# Patient Record
Sex: FEMALE | Race: WHITE | Employment: OTHER | URBAN - METROPOLITAN AREA
[De-identification: names, ages, dates, MRNs, and addresses within clinical notes are randomized per-mention and may not be internally consistent; named-entity substitution may affect disease eponyms.]

---

## 2017-01-13 ENCOUNTER — TELEPHONE (OUTPATIENT)
Dept: ORTHOPEDIC SURGERY | Age: 77
End: 2017-01-13

## 2017-01-18 ENCOUNTER — TELEPHONE (OUTPATIENT)
Dept: ORTHOPEDIC SURGERY | Age: 77
End: 2017-01-18

## 2017-09-14 ENCOUNTER — HOSPITAL ENCOUNTER (OUTPATIENT)
Dept: MAMMOGRAPHY | Age: 77
Discharge: HOME | End: 2017-09-14
Attending: OBSTETRICS & GYNECOLOGY | Admitting: OBSTETRICS & GYNECOLOGY

## 2017-09-14 DIAGNOSIS — Z12.31 ENCOUNTER FOR SCREENING MAMMOGRAM FOR BREAST CANCER: ICD-10-CM

## 2017-12-26 ENCOUNTER — TELEPHONE (OUTPATIENT)
Dept: ORTHOPEDIC SURGERY | Age: 77
End: 2017-12-26

## 2018-08-20 DIAGNOSIS — M17.10 LOCALIZED OSTEOARTHROSIS, LOWER LEG: Primary | Chronic | ICD-10-CM

## 2018-08-20 DIAGNOSIS — M17.11 PRIMARY OSTEOARTHRITIS OF RIGHT KNEE: Chronic | ICD-10-CM

## 2018-08-29 ENCOUNTER — TELEPHONE (OUTPATIENT)
Dept: ORTHOPEDIC SURGERY | Age: 78
End: 2018-08-29

## 2018-08-29 DIAGNOSIS — M17.10 LOCALIZED OSTEOARTHROSIS, LOWER LEG: Primary | Chronic | ICD-10-CM

## 2018-08-29 DIAGNOSIS — M17.11 PRIMARY OSTEOARTHRITIS OF RIGHT KNEE: Chronic | ICD-10-CM

## 2018-08-30 ENCOUNTER — TELEPHONE (OUTPATIENT)
Dept: ORTHOPEDIC SURGERY | Age: 78
End: 2018-08-30

## 2018-08-30 NOTE — TELEPHONE ENCOUNTER
08/30/2018 EUFLEXXA  (SERIES OF 3)   RIGHT KNEE. APPROVED # [id number]. DATES: 08/30/2018 -  08/29/2019. PER FAX FROM [facility]. NOTE: [facility] plan covers one series per 12 months.   AP

## 2018-09-04 ENCOUNTER — TELEPHONE (OUTPATIENT)
Dept: ORTHOPEDIC SURGERY | Age: 78
End: 2018-09-04

## 2018-09-12 ENCOUNTER — OFFICE VISIT (OUTPATIENT)
Dept: ORTHOPEDIC SURGERY | Age: 78
End: 2018-09-12

## 2018-09-12 VITALS
HEIGHT: 67 IN | WEIGHT: 152 LBS | BODY MASS INDEX: 23.86 KG/M2 | SYSTOLIC BLOOD PRESSURE: 121 MMHG | HEART RATE: 82 BPM | DIASTOLIC BLOOD PRESSURE: 70 MMHG

## 2018-09-12 DIAGNOSIS — M17.11 PRIMARY OSTEOARTHRITIS OF RIGHT KNEE: Primary | Chronic | ICD-10-CM

## 2018-09-12 PROCEDURE — 20610 DRAIN/INJ JOINT/BURSA W/O US: CPT | Performed by: FAMILY MEDICINE

## 2018-09-12 PROCEDURE — 99213 OFFICE O/P EST LOW 20 MIN: CPT | Performed by: FAMILY MEDICINE

## 2018-09-12 NOTE — PROGRESS NOTES
Chief Complaint  Knee Pain (EUFLEXXA #1 RIGHT KNEE)      Initial evaluation recurrent right anterior medial and lateral knee pain with history of known arthritis and previous positive response to Euflexxa    History of Present Illness:  Vivian Ng is a 69 y.o. female was very pleasant white female recreational walker who is a patient of Dr. Caitlin Gibson with known history of right knee multicompartment osteoarthritis who is being seen today in Dr. Rebecca Varner's absence for evaluation of worsening right knee pain. She completed her last round of Visco supplementation on 12/23/2016 and over the last 1-2 months, she has noticed increasing soreness involving the anterior medial and lateral portion of her right knee. There is no history of injury, new Activity or fall prior to becoming symptomatic. She does describe an achy pain at most 1-2 out of 10 although she has days that her knee does not really bother her substantially. Most of her pain is with distance walking positional changes and going up and down stairs which can be a 5-6 out of 10. She admits she is really not been very good about performing her home-based exercises and occasionally will take an Advil or Aleve in addition to her chronic meloxicam.  This is not done consistently. She does occasionally utilize her knee brace. Denies active locking catching or instability symptoms. She has had some mild episodic swelling. No groin pain. She has been contemplating repeat viscous supplementation would like to put off surgical intervention on her knee as long she can. Medical History     Patient's medications, allergies, past medical, surgical, social and family histories were reviewed and updated as appropriate. Review of Systems  Pertinent items are noted in HPI  Review of systems reviewed from Patient History Form dated on 9/12/2018 and available in the patient's chart under the Media tab.        Vital Signs  Vitals:    09/12/18 1501   BP: protection program once again in the office and she was encouraged to perform this on a regular basis. Icing and activity modification was discussed. She will be seen back each of the next 2 weeks by Dr. Diana Colindres to continue with her Euflexxa series which she has done very well with the past.          This dictation was performed with a verbal recognition program (DRAGON) and it was checked for errors. It is possible that there are still dictated errors within this office note. If so, please bring any errors to my attention for an addendum. All efforts were made to ensure that this office note is accurate.

## 2018-09-17 ENCOUNTER — OFFICE VISIT (OUTPATIENT)
Dept: ORTHOPEDIC SURGERY | Age: 78
End: 2018-09-17

## 2018-09-17 DIAGNOSIS — M17.11 PRIMARY OSTEOARTHRITIS OF RIGHT KNEE: Primary | Chronic | ICD-10-CM

## 2018-09-17 PROCEDURE — 20610 DRAIN/INJ JOINT/BURSA W/O US: CPT | Performed by: ORTHOPAEDIC SURGERY

## 2018-09-21 ENCOUNTER — OFFICE VISIT (OUTPATIENT)
Dept: ORTHOPEDIC SURGERY | Age: 78
End: 2018-09-21

## 2018-09-21 VITALS
HEART RATE: 86 BPM | DIASTOLIC BLOOD PRESSURE: 74 MMHG | HEIGHT: 67 IN | BODY MASS INDEX: 23.54 KG/M2 | SYSTOLIC BLOOD PRESSURE: 130 MMHG | WEIGHT: 150 LBS

## 2018-09-21 DIAGNOSIS — M17.11 PRIMARY OSTEOARTHRITIS OF RIGHT KNEE: Primary | Chronic | ICD-10-CM

## 2018-09-21 PROCEDURE — 20610 DRAIN/INJ JOINT/BURSA W/O US: CPT | Performed by: ORTHOPAEDIC SURGERY

## 2018-09-21 PROCEDURE — 99999 PR OFFICE/OUTPT VISIT,PROCEDURE ONLY: CPT | Performed by: ORTHOPAEDIC SURGERY

## 2018-09-21 RX ORDER — METHYLPREDNISOLONE 4 MG/1
1 TABLET ORAL
Status: ON HOLD | COMMUNITY
Start: 2018-02-21 | End: 2021-10-31

## 2018-09-21 RX ORDER — PREDNISONE 10 MG/1
10 TABLET ORAL
Status: ON HOLD | COMMUNITY
Start: 2018-02-26 | End: 2021-10-31

## 2018-09-21 RX ORDER — CARVEDILOL 3.12 MG/1
3.12 TABLET ORAL
COMMUNITY
Start: 2018-03-27 | End: 2021-10-27

## 2018-09-21 NOTE — PROGRESS NOTES
injection of the Right knee  2) Osteoarthritis    Plan:  1) ice, elevation, gentle range of motion as needed for swelling or stiffness of the knee  2) NSAIDs for any pain after injection       Hafsa Patel

## 2018-11-09 ENCOUNTER — HOSPITAL ENCOUNTER (OUTPATIENT)
Dept: WOMENS IMAGING | Age: 78
Discharge: HOME | End: 2018-11-09
Payer: COMMERCIAL

## 2018-11-09 DIAGNOSIS — Z12.31 ENCOUNTER FOR SCREENING MAMMOGRAM FOR BREAST CANCER: ICD-10-CM

## 2018-11-09 PROCEDURE — 77063 BREAST TOMOSYNTHESIS BI: CPT

## 2019-12-02 ENCOUNTER — HOSPITAL ENCOUNTER (OUTPATIENT)
Dept: WOMENS IMAGING | Age: 79
Discharge: HOME | End: 2019-12-02
Payer: COMMERCIAL

## 2019-12-02 DIAGNOSIS — Z12.31 ENCOUNTER FOR SCREENING MAMMOGRAM FOR BREAST CANCER: ICD-10-CM

## 2019-12-02 PROCEDURE — 77063 BREAST TOMOSYNTHESIS BI: CPT

## 2020-12-22 ENCOUNTER — HOSPITAL ENCOUNTER (OUTPATIENT)
Dept: WOMENS IMAGING | Age: 80
Discharge: HOME | End: 2020-12-22
Payer: COMMERCIAL

## 2020-12-22 PROCEDURE — 77063 BREAST TOMOSYNTHESIS BI: CPT

## 2021-02-17 ENCOUNTER — IMMUNIZATION (OUTPATIENT)
Dept: PRIMARY CARE CLINIC | Age: 81
End: 2021-02-17
Payer: COMMERCIAL

## 2021-02-17 PROCEDURE — 91301 COVID-19, MODERNA VACCINE 100MCG/0.5ML DOSE: CPT | Performed by: FAMILY MEDICINE

## 2021-02-17 PROCEDURE — 0011A COVID-19, MODERNA VACCINE 100MCG/0.5ML DOSE: CPT | Performed by: FAMILY MEDICINE

## 2021-03-17 ENCOUNTER — IMMUNIZATION (OUTPATIENT)
Dept: PRIMARY CARE CLINIC | Age: 81
End: 2021-03-17
Payer: COMMERCIAL

## 2021-03-17 PROCEDURE — 91301 COVID-19, MODERNA VACCINE 100MCG/0.5ML DOSE: CPT | Performed by: FAMILY MEDICINE

## 2021-03-17 PROCEDURE — 0012A COVID-19, MODERNA VACCINE 100MCG/0.5ML DOSE: CPT | Performed by: FAMILY MEDICINE

## 2021-10-27 ENCOUNTER — OFFICE VISIT (OUTPATIENT)
Dept: CARDIOLOGY CLINIC | Age: 81
End: 2021-10-27
Payer: COMMERCIAL

## 2021-10-27 VITALS
DIASTOLIC BLOOD PRESSURE: 86 MMHG | BODY MASS INDEX: 24.17 KG/M2 | WEIGHT: 154 LBS | HEART RATE: 84 BPM | OXYGEN SATURATION: 97 % | HEIGHT: 67 IN | SYSTOLIC BLOOD PRESSURE: 158 MMHG

## 2021-10-27 DIAGNOSIS — I51.89 DIASTOLIC DYSFUNCTION: ICD-10-CM

## 2021-10-27 DIAGNOSIS — I42.8 NON-ISCHEMIC CARDIOMYOPATHY (HCC): ICD-10-CM

## 2021-10-27 DIAGNOSIS — I10 ESSENTIAL HYPERTENSION: ICD-10-CM

## 2021-10-27 DIAGNOSIS — I34.0 MITRAL VALVE INSUFFICIENCY, UNSPECIFIED ETIOLOGY: ICD-10-CM

## 2021-10-27 DIAGNOSIS — I48.19 PERSISTENT ATRIAL FIBRILLATION (HCC): Primary | ICD-10-CM

## 2021-10-27 DIAGNOSIS — Z87.891 FORMER TOBACCO USE: ICD-10-CM

## 2021-10-27 PROCEDURE — 99204 OFFICE O/P NEW MOD 45 MIN: CPT | Performed by: INTERNAL MEDICINE

## 2021-10-27 PROCEDURE — 93000 ELECTROCARDIOGRAM COMPLETE: CPT | Performed by: INTERNAL MEDICINE

## 2021-10-27 RX ORDER — CARVEDILOL 6.25 MG/1
6.25 TABLET ORAL 2 TIMES DAILY
Qty: 30 TABLET | Refills: 3 | Status: ON HOLD
Start: 2021-10-27 | End: 2021-11-09 | Stop reason: HOSPADM

## 2021-10-27 NOTE — PROGRESS NOTES
[address] [facility]   Cardiovascular Evaluation    PATIENT: Jerardo Morgan  DATE: 10/27/2021  MRN: <[id number]>  CSN: [id number]  : 1940      Primary Care Doctor: Stephanie Valadez MD  Reason for evaluation:   Atrial fibrillation    Subjective:   History of present illness on initial date of evaluation:  Kelly Tomas is a 80-year-old female with a history of non-ischemic cardiomyopathy with recovered LVEF, essential hypertension, mitral regurgitation, tobacco use, and recently diagnosed atrial fibrillation who presents a new patient. The patient previously received her cardiology care at [facility].  She was recently diagnosed with atrial fibrillation by her PCP and started on coreg and Eliquis. She had been scheduled for a ROBERTO/DCCV at [facility] once she had had an adequate period of anticoagulation, but she would like to transition her care to [facility]. She states that she has had some fatigue, shortness of breath, and occasional palpitations recently. She denies any chest pain, lightheadedness, dizziness, lower extremity edema, orthopnea, or paroxysmal nocturnal dyspnea. She was diagnosed with a mildly reduced LVEF of 40-45% on a TTE from  at [facility].  At that time, she underwent invasive coronary angiography which demonstrated no angiographically significant coronary artery disease. Her LVEF had recovered to 60-65% on a TTE from  and was estimated between 52-57% on her most recent TTE at [facility] on 20. Patient Active Problem List   Diagnosis    Localized osteoarthrosis, lower leg    Tear of lateral cartilage or meniscus of knee, current    Tear of medial meniscus of right knee    Primary osteoarthritis of right knee         Past Medical History:   has a past medical history of Hypertension and Thyroid disease. Surgical History:   has a past surgical history that includes Hemorrhoid surgery; Hysterectomy;  Tonsillectomy; and Knee arthroscopy (Right, 3/10/2016). Social History:   reports that she quit smoking about 41 years ago. She has never used smokeless tobacco. She reports current alcohol use of about 12.0 - 14.0 standard drinks of alcohol per week. She reports that she does not use drugs. Family History:  No evidence for sudden cardiac death or premature CAD. Home Medications:  Reviewed and are listed in nursing record. and/or listed below  Current Outpatient Medications   Medication Sig Dispense Refill    Apixaban (ELIQUIS PO) Take by mouth      ibuprofen (ADVIL;MOTRIN) 800 MG tablet TAKE 1 TABLET BY MOUTH THREE TIMES DAILY( EVERY EIGHT HOURS) 60 tablet 0    SYNTHROID 100 MCG tablet Take 100 mcg by mouth daily       losartan-hydrochlorothiazide (HYZAAR) 50-12.5 MG per tablet Take 0.5 tablets by mouth 2 times daily       carvedilol (COREG) 3.125 MG tablet Take 3.125 mg by mouth 2 times daily       carvedilol (COREG) 3.125 MG tablet Take 3.125 mg by mouth (Patient not taking: Reported on 10/27/2021)      methylPREDNISolone (MEDROL DOSEPACK) 4 MG tablet Take 1 Package by mouth (Patient not taking: Reported on 10/27/2021)      predniSONE (DELTASONE) 10 MG tablet Take 10 mg by mouth (Patient not taking: Reported on 10/27/2021)      meloxicam (MOBIC) 15 MG tablet TAKE 1 TABLET BY MOUTH DAILY 30 tablet 0    meloxicam (MOBIC) 15 MG tablet TAKE 1 TABLET BY MOUTH DAILY 30 tablet 0    meloxicam (MOBIC) 15 MG tablet TAKE 1 TABLET BY MOUTH DAILY 30 tablet 0    HYDROcodone-acetaminophen (NORCO) 5-325 MG per tablet Take 1 to 2 tablets by mouth every 6 to 8 hours as needed for pain. (Patient not taking: Reported on 10/27/2021) 20 tablet 0     No current facility-administered medications for this visit. Allergies:  Shellfish-derived products     Review of Systems:   Review of Systems   Constitutional: Positive for fatigue. Negative for chills and fever. HENT: Negative for congestion, rhinorrhea and sore throat.     Eyes: Negative for photophobia, pain and visual disturbance. Respiratory: Positive for shortness of breath. Negative for cough. Cardiovascular: Positive for palpitations. Negative for chest pain and leg swelling. Gastrointestinal: Negative for abdominal pain, blood in stool, constipation, diarrhea, nausea and vomiting. Endocrine: Negative for cold intolerance and heat intolerance. Genitourinary: Negative for difficulty urinating, dysuria and hematuria. Musculoskeletal: Negative for arthralgias, joint swelling and myalgias. Skin: Negative for rash and wound. Allergic/Immunologic: Negative for environmental allergies and food allergies. Neurological: Negative for dizziness, syncope and light-headedness. Hematological: Negative for adenopathy. Does not bruise/bleed easily. Psychiatric/Behavioral: Negative for dysphoric mood. The patient is not nervous/anxious. Objective:   PHYSICAL EXAM:    Vitals:    10/27/21 1548   BP: (!) 158/86   Pulse: 84   SpO2: 97% on room air    Weight: 154 lb (69.9 kg)     Wt Readings from Last 3 Encounters:   10/27/21 154 lb (69.9 kg)   09/21/18 150 lb (68 kg)   09/12/18 152 lb (68.9 kg)     General: Elderly female in no acute distress. Pleasant and interactive on exam.  HEENT: Normocephalic, atraumatic, non-icteric, hearing intact, nares normal, mucous membranes moist.  Neck: Supple, trachea midline. No adenopathy. No thyromegaly. No carotid bruits. No JVD. Heart: Irregularly irregular rate and rhythm. Normal S1 and S2. Grade II/VI holosystolic murmur. No rubs or gallops. Lungs: Normal respiratory effort. Clear to auscultation bilaterally. No wheezes, rales, or rhonchi. Abdomen: Soft, non-tender. Normoactive bowel sounds. No masses or organomegaly. Skin: No rashes, wounds, or lesions. Pulses: 2+ and symmetric. Extremities: No clubbing, cyanosis, or edema. Musculoskeletal: Spontaneously moves all four extremities. Psych: Normal mood and affect.   Neuro: Alert and oriented to person, place, and time. No focal deficits noted. LABS   CBC:      Lab Results   Component Value Date    WBC 4.8 09/25/2013    RBC 4.29 09/25/2013    HGB 13.4 09/25/2013    HCT 39.6 09/25/2013    MCV 92.2 09/25/2013    RDW 13.9 09/25/2013     09/25/2013     CMP:  Lab Results   Component Value Date     02/25/2010    K 4.1 02/25/2010     02/25/2010    CO2 30 02/25/2010    BUN 17 02/25/2010    CREATININE 0.8 02/25/2010    GFRAA >60 02/25/2010    GLUCOSE 104 02/25/2010    PROT 7.6 02/25/2010    CALCIUM 9.7 02/25/2010    BILITOT 0.50 02/25/2010    ALKPHOS 55 02/25/2010    AST 30 02/25/2010    ALT 28 02/25/2010     PT/INR:   No results found for: PTINR  Liver:  No components found for: CHLPL  Lab Results   Component Value Date    ALT 28 02/25/2010    AST 30 02/25/2010    ALKPHOS 55 02/25/2010    BILITOT 0.50 02/25/2010     No results found for: LABA1C  Lipids:       No results found for: TRIG       No results found for: HDL       No results found for: LDLCALC       No results found for: LABVLDL      CARDIAC DATA   LAST EKG 10/27/21: Atrial fibrillation, non-specific T wave abnormality. Echo:  TTE 3/2/05:  RESULTS:    1. Technically adequate study. 2.  Left ventricular function is abnormal.  There are septal and       lateral hypokinesis. Estimated ejection fraction is 40% to 45%. 3. Wright Neither is no chamber enlargement. Wright Neither is no ventricular       hypertrophy.  The right sided chambers are not enlarged. There       is mild left atrial enlargement. 4.  The aortic, mitral, tricuspid and pulmonary valves function       adequately. Wright Neither is no evidence of valvular stenosis or       prolapse. 5.  No thrombus, mass or effusions were seen. 6.  Doppler interrogation revealed normal antegrade velocities.  The       mean gradient across the aortic valve is 4.6 mm Hg.  Color Doppler       demonstrated mild mitral and tricuspid regurgitation.  Right       ventricular systolic pressure was 25 mm Hg.      TTE 9/11/14:  Study Conclusions   - Left ventricle: The cavity size was normal. Wall thickness     was normal. Systolic function was normal. The estimated     ejection fraction was in the range of 60% to 65%. Wall     motion was normal; there were no regional wall motion     abnormalities. Doppler parameters are consistent with     abnormal left ventricular relaxation (grade 1 diastolic     dysfunction). Mitral valve: Mildly calcified annulus.     Mildly thickened leaflets. Left atrium: The atrium was     mildly dilated. Inferior vena cava: The vessel was normal     in size; the respirophasic diameter changes were in the     normal range (= 50%); findings are consistent with normal     central venous pressure. TTE 3/12/18:  Study Conclusions   - Left ventricle: The cavity size was normal. There was mild     concentric hypertrophy. Systolic function was normal. The     estimated ejection fraction was in the range of 54% to 58%. Wall     motion was normal; there were no regional wall motion     abnormalities. Doppler parameters are consistent with abnormal     left ventricular relaxation (grade 1 diastolic dysfunction). The     global longitudinal strain was -19%. - Mitral valve: The annulus was mildly calcified. The leaflets were     mildly thickened. Leaflet separation was reduced. Mobility was     restricted. There was mild regurgitation directed posteriorly. - Left atrium: The atrium was mildly to moderately dilated. - Inferior vena cava: The vessel was normal in size; the     respirophasic diameter changes were in the normal range (>= 50%);     findings are consistent with normal central venous pressure.        TTE 6/23/20:  Study Conclusions   - Left ventricle: The cavity size is normal. There is mild focal     basal hypertrophy. There is hypertrophy of the septum. Systolic     function was normal. The estimated ejection fraction was in the   Filiberto of 52% to 57%.  Wall motion was normal; there were no     regional wall motion abnormalities. Features are consistent with     a pseudonormal left ventricular filling pattern, with concomitant     abnormal relaxation and increased filling pressure (grade 2     diastolic dysfunction). The global longitudinal strain was -22%. - Aortic valve: Thickening, consistent with sclerosis. - Mitral valve: Leaflet separation was reduced. Mobility was     restricted. There was mild to moderate regurgitation.   - Left atrium: The atrium is markedly dilated. - Inferior vena cava: The vessel was normal in size; the     respirophasic diameter changes were in the normal range (&gt;= 50%);     findings are consistent with normal central venous pressure.         Cath:  [facility] 4/6/05:  FINDINGS:     CORONARY ARTERIOGRAPHY: Coronary arteriography of this   co-dominant system revealed no significant stenoses of the LMCA,   LAD, left circumflex or RCA. LEFT VENTRICULOGRAPHY: Left ventriculogram in the RANGEL projection   in the setting of ectopy revealed normal left ventricular   function with an estimated ejection fraction of greater then 50%   and trace mitral regurgitation. IMPRESSION: No significant coronary artery disease, probably   normal left ventricular function. PLAN:   Medical therapy. We will arrange a MUGA to definitively assess LV function.         Other Studies:   Chest X-ray 10/30/21:  Mild cardiomegaly and COPD.  No other cardiopulmonary disease. Assessment:     1. Persistent atrial fibrillation - Ventricular rate currently controlled. [id number] is 5. Recently started on Eliquis. 2. Non-ischemic cardiomyopathy with recovered LVEF - Diagnosed with a mildly reduced LVEF of 40-45% on a TTE from 2005 at [facility].  At that time, she underwent invasive coronary angiography which demonstrated no angiographically significant coronary artery disease.   Her LVEF had recovered to 60-65% on a TTE from 2014 and was estimated between 52-57% on her most recent TTE at [facility] on 6/23/20. 3. Essential hypertension - BP elevated today in clinic at 160/85 and 158/86 on repeat. 4. Diastolic dysfunction - Currently appears clinically euvolemic. 5. Mitral regurgitation  6. Former tobacco use      Plan:     1. Will continue current medical therapy with Eliquis 5 mg BID, coreg 6.25 mg BID, and losartan-HCTZ 25-6.25 mg BID for now. 2. Obtain TTE for formal evaluation of cardiac structure and function. 3. Will arrange for ROBERTO/DCCV in approximately 2 weeks after patient has completed adequate period of anticoagulation. 4. Her BP is elevated today in clinic and if elevated on repeat evaluation, will plan to increase losartan-HCTZ to 50-12.5 mg BID. 5. Per review of her outside records, it does not appear that she has had any recent lab work, so will check CBC, BMP, magnesium, and TSH. 6. Follow-up after electrocardioversion. Joan Rivera RN, am scribing for and in the presence of Dr. Oumar Garcia. 10/27/21 4:33 PM   Evelien Khan RN      It is a pleasure to assist in the care of Patrick Mas. Please call with any questions. The scribes documentation has been prepared under my direction and personally reviewed by me in its entirety. I confirm that the note above accurately reflects all work, treatment, procedures, and medical decision making performed by me. I, Dr. Libra Shell, personally performed the services described in this documentation as scribed by Eveline Khan RN in my presence, and it is both accurate and complete to the best of our ability.        Libra Shell, [address] [facility]  [phone] [facility]  [phone] [address] [place] [address]

## 2021-10-27 NOTE — PATIENT INSTRUCTIONS
Patient Plan:  1. Recommend ROBERTO and Cardioversion to pursue normal rhythm.   -We will call you to set this up in 2 weeks. 2. Continue blood thinner to prevent stroke. 3. Echo to re assess heart structure. You need to call to schedule this. 4. Blood work- CBC, BMP, magnesium and TSH- get this done today. 5. Call us with your dose of carvedilol. 6. Follow up after procedure. Your provider has ordered testing for further evaluation. An order/prescription has been included in your paper work.  To schedule outpatient testing, contact Central Scheduling by calling [address] [place] [address] ([phone]).

## 2021-10-29 ENCOUNTER — TELEPHONE (OUTPATIENT)
Dept: CARDIOLOGY CLINIC | Age: 81
End: 2021-10-29

## 2021-10-29 NOTE — TELEPHONE ENCOUNTER
Yes, let's wait until she recovers from her current illness prior to proceeding with ROBERTO/DCCV. She can get her COVID test closer to procedure once it has been scheduled.

## 2021-10-29 NOTE — TELEPHONE ENCOUNTER
Patient calling in to let [facility] know that she has a virus right now and needs to put off her testing prior to her DCCV. She will call back when she schedules testing and let us know.

## 2021-10-29 NOTE — TELEPHONE ENCOUNTER
Spoke to pt and relayed [facility] message. V/U. Pt will call us once she starts feeling better.

## 2021-10-29 NOTE — TELEPHONE ENCOUNTER
Patient seen in office 10/27/2021. ROBERTO and CV discussed and agreed upon by [facility] and patient. Medications not discussed. Patient will need covid testing prior. Thank you!        Schedule for 2 weeks out please per [facility]

## 2021-10-30 ENCOUNTER — APPOINTMENT (OUTPATIENT)
Dept: CT IMAGING | Age: 81
DRG: 871 | End: 2021-10-30
Payer: MEDICARE

## 2021-10-30 ENCOUNTER — HOSPITAL ENCOUNTER (INPATIENT)
Age: 81
LOS: 10 days | Discharge: HOME HEALTH | DRG: 871 | End: 2021-11-09
Attending: EMERGENCY MEDICINE | Admitting: INTERNAL MEDICINE
Payer: MEDICARE

## 2021-10-30 ENCOUNTER — APPOINTMENT (OUTPATIENT)
Dept: GENERAL RADIOLOGY | Age: 81
DRG: 871 | End: 2021-10-30
Payer: MEDICARE

## 2021-10-30 DIAGNOSIS — N39.0 URINARY TRACT INFECTION WITH HEMATURIA, SITE UNSPECIFIED: ICD-10-CM

## 2021-10-30 DIAGNOSIS — I48.91 RAPID ATRIAL FIBRILLATION (HCC): ICD-10-CM

## 2021-10-30 DIAGNOSIS — N17.9 ACUTE RENAL FAILURE, UNSPECIFIED ACUTE RENAL FAILURE TYPE (HCC): Primary | ICD-10-CM

## 2021-10-30 DIAGNOSIS — R11.2 NAUSEA, VOMITING, AND DIARRHEA: ICD-10-CM

## 2021-10-30 DIAGNOSIS — R19.7 NAUSEA, VOMITING, AND DIARRHEA: ICD-10-CM

## 2021-10-30 DIAGNOSIS — E83.42 HYPOMAGNESEMIA: ICD-10-CM

## 2021-10-30 DIAGNOSIS — R31.9 URINARY TRACT INFECTION WITH HEMATURIA, SITE UNSPECIFIED: ICD-10-CM

## 2021-10-30 PROBLEM — A41.9 SEPSIS (HCC): Status: ACTIVE | Noted: 2021-10-30

## 2021-10-30 LAB
A/G RATIO: 1 (ref 1.1–2.2)
ALBUMIN SERPL-MCNC: 3 G/DL (ref 3.4–5)
ALP BLD-CCNC: 90 U/L (ref 40–129)
ALT SERPL-CCNC: 32 U/L (ref 10–40)
ANION GAP SERPL CALCULATED.3IONS-SCNC: 13 MMOL/L (ref 3–16)
ANISOCYTOSIS: ABNORMAL
AST SERPL-CCNC: 35 U/L (ref 15–37)
BACTERIA: ABNORMAL /HPF
BANDED NEUTROPHILS RELATIVE PERCENT: 21 % (ref 0–7)
BASOPHILS ABSOLUTE: 0 K/UL (ref 0–0.2)
BASOPHILS RELATIVE PERCENT: 0 %
BILIRUB SERPL-MCNC: 1.6 MG/DL (ref 0–1)
BILIRUBIN URINE: NEGATIVE
BLOOD, URINE: ABNORMAL
BUN BLDV-MCNC: 38 MG/DL (ref 7–20)
CALCIUM SERPL-MCNC: 8.4 MG/DL (ref 8.3–10.6)
CHLORIDE BLD-SCNC: 89 MMOL/L (ref 99–110)
CLARITY: ABNORMAL
CO2: 20 MMOL/L (ref 21–32)
COLOR: YELLOW
CREAT SERPL-MCNC: 1.7 MG/DL (ref 0.6–1.2)
DOHLE BODIES: PRESENT
EOSINOPHILS ABSOLUTE: 0 K/UL (ref 0–0.6)
EOSINOPHILS RELATIVE PERCENT: 0 %
GFR AFRICAN AMERICAN: 35
GFR NON-AFRICAN AMERICAN: 29
GLUCOSE BLD-MCNC: 230 MG/DL (ref 70–99)
GLUCOSE URINE: NEGATIVE MG/DL
HCT VFR BLD CALC: 36.5 % (ref 36–48)
HEMOGLOBIN: 12.2 G/DL (ref 12–16)
KETONES, URINE: NEGATIVE MG/DL
LACTIC ACID, SEPSIS: 2.4 MMOL/L (ref 0.4–1.9)
LEUKOCYTE ESTERASE, URINE: ABNORMAL
LYMPHOCYTES ABSOLUTE: 0.8 K/UL (ref 1–5.1)
LYMPHOCYTES RELATIVE PERCENT: 4 %
MAGNESIUM: 1.6 MG/DL (ref 1.8–2.4)
MCH RBC QN AUTO: 29.7 PG (ref 26–34)
MCHC RBC AUTO-ENTMCNC: 33.5 G/DL (ref 31–36)
MCV RBC AUTO: 88.4 FL (ref 80–100)
MICROSCOPIC EXAMINATION: YES
MONOCYTES ABSOLUTE: 0.2 K/UL (ref 0–1.3)
MONOCYTES RELATIVE PERCENT: 1 %
NEUTROPHILS ABSOLUTE: 20.1 K/UL (ref 1.7–7.7)
NEUTROPHILS RELATIVE PERCENT: 74 %
NITRITE, URINE: NEGATIVE
PDW BLD-RTO: 13.9 % (ref 12.4–15.4)
PH UA: 6 (ref 5–8)
PLATELET # BLD: 136 K/UL (ref 135–450)
PLATELET SLIDE REVIEW: ADEQUATE
PMV BLD AUTO: 8.1 FL (ref 5–10.5)
POTASSIUM REFLEX MAGNESIUM: 3.5 MMOL/L (ref 3.5–5.1)
PRO-BNP: 7318 PG/ML (ref 0–449)
PROTEIN UA: 30 MG/DL
RBC # BLD: 4.13 M/UL (ref 4–5.2)
RBC UA: ABNORMAL /HPF (ref 0–4)
SLIDE REVIEW: ABNORMAL
SODIUM BLD-SCNC: 122 MMOL/L (ref 136–145)
SPECIFIC GRAVITY UA: 1.02 (ref 1–1.03)
TOTAL PROTEIN: 5.9 G/DL (ref 6.4–8.2)
TROPONIN: <0.01 NG/ML
URINE REFLEX TO CULTURE: YES
URINE TYPE: ABNORMAL
UROBILINOGEN, URINE: 1 E.U./DL
VACUOLATED NEUTROPHILS: PRESENT
WBC # BLD: 21.2 K/UL (ref 4–11)
WBC UA: >100 /HPF (ref 0–5)

## 2021-10-30 PROCEDURE — 87186 SC STD MICRODIL/AGAR DIL: CPT

## 2021-10-30 PROCEDURE — 87040 BLOOD CULTURE FOR BACTERIA: CPT

## 2021-10-30 PROCEDURE — 81001 URINALYSIS AUTO W/SCOPE: CPT

## 2021-10-30 PROCEDURE — 83605 ASSAY OF LACTIC ACID: CPT

## 2021-10-30 PROCEDURE — 96361 HYDRATE IV INFUSION ADD-ON: CPT

## 2021-10-30 PROCEDURE — 2580000003 HC RX 258: Performed by: EMERGENCY MEDICINE

## 2021-10-30 PROCEDURE — 71045 X-RAY EXAM CHEST 1 VIEW: CPT

## 2021-10-30 PROCEDURE — 87086 URINE CULTURE/COLONY COUNT: CPT

## 2021-10-30 PROCEDURE — 74176 CT ABD & PELVIS W/O CONTRAST: CPT

## 2021-10-30 PROCEDURE — 87088 URINE BACTERIA CULTURE: CPT

## 2021-10-30 PROCEDURE — 83735 ASSAY OF MAGNESIUM: CPT

## 2021-10-30 PROCEDURE — 83880 ASSAY OF NATRIURETIC PEPTIDE: CPT

## 2021-10-30 PROCEDURE — 93005 ELECTROCARDIOGRAM TRACING: CPT | Performed by: EMERGENCY MEDICINE

## 2021-10-30 PROCEDURE — 85025 COMPLETE CBC W/AUTO DIFF WBC: CPT

## 2021-10-30 PROCEDURE — 99285 EMERGENCY DEPT VISIT HI MDM: CPT

## 2021-10-30 PROCEDURE — 2060000000 HC ICU INTERMEDIATE R&B

## 2021-10-30 PROCEDURE — 80053 COMPREHEN METABOLIC PANEL: CPT

## 2021-10-30 PROCEDURE — 96365 THER/PROPH/DIAG IV INF INIT: CPT

## 2021-10-30 PROCEDURE — 87150 DNA/RNA AMPLIFIED PROBE: CPT

## 2021-10-30 PROCEDURE — 6360000002 HC RX W HCPCS: Performed by: EMERGENCY MEDICINE

## 2021-10-30 PROCEDURE — 84484 ASSAY OF TROPONIN QUANT: CPT

## 2021-10-30 RX ORDER — SODIUM CHLORIDE 0.9 % (FLUSH) 0.9 %
5-40 SYRINGE (ML) INJECTION PRN
Status: DISCONTINUED | OUTPATIENT
Start: 2021-10-30 | End: 2021-11-09 | Stop reason: HOSPADM

## 2021-10-30 RX ORDER — SODIUM CHLORIDE 9 MG/ML
25 INJECTION, SOLUTION INTRAVENOUS PRN
Status: DISCONTINUED | OUTPATIENT
Start: 2021-10-30 | End: 2021-11-09 | Stop reason: HOSPADM

## 2021-10-30 RX ORDER — ONDANSETRON 2 MG/ML
4 INJECTION INTRAMUSCULAR; INTRAVENOUS
Status: DISCONTINUED | OUTPATIENT
Start: 2021-10-30 | End: 2021-11-09 | Stop reason: HOSPADM

## 2021-10-30 RX ORDER — MAGNESIUM SULFATE IN WATER 40 MG/ML
2000 INJECTION, SOLUTION INTRAVENOUS ONCE
Status: COMPLETED | OUTPATIENT
Start: 2021-10-30 | End: 2021-10-31

## 2021-10-30 RX ORDER — SODIUM CHLORIDE 0.9 % (FLUSH) 0.9 %
5-40 SYRINGE (ML) INJECTION EVERY 12 HOURS SCHEDULED
Status: DISCONTINUED | OUTPATIENT
Start: 2021-10-30 | End: 2021-11-09 | Stop reason: HOSPADM

## 2021-10-30 RX ORDER — 0.9 % SODIUM CHLORIDE 0.9 %
30 INTRAVENOUS SOLUTION INTRAVENOUS ONCE
Status: COMPLETED | OUTPATIENT
Start: 2021-10-30 | End: 2021-10-30

## 2021-10-30 RX ADMIN — VANCOMYCIN HYDROCHLORIDE 1500 MG: 10 INJECTION, POWDER, LYOPHILIZED, FOR SOLUTION INTRAVENOUS at 23:04

## 2021-10-30 RX ADMIN — PIPERACILLIN SODIUM AND TAZOBACTAM SODIUM 4500 MG: 4; .5 INJECTION, POWDER, LYOPHILIZED, FOR SOLUTION INTRAVENOUS at 22:11

## 2021-10-30 RX ADMIN — SODIUM CHLORIDE 2040 ML: 9 INJECTION, SOLUTION INTRAVENOUS at 21:06

## 2021-10-30 ASSESSMENT — PAIN SCALES - GENERAL: PAINLEVEL_OUTOF10: 8

## 2021-10-31 LAB
A/G RATIO: 1 (ref 1.1–2.2)
ALBUMIN SERPL-MCNC: 2.7 G/DL (ref 3.4–5)
ALP BLD-CCNC: 86 U/L (ref 40–129)
ALT SERPL-CCNC: 28 U/L (ref 10–40)
ANION GAP SERPL CALCULATED.3IONS-SCNC: 12 MMOL/L (ref 3–16)
ANION GAP SERPL CALCULATED.3IONS-SCNC: 13 MMOL/L (ref 3–16)
AST SERPL-CCNC: 30 U/L (ref 15–37)
BANDED NEUTROPHILS RELATIVE PERCENT: 6 % (ref 0–7)
BASOPHILS ABSOLUTE: 0 K/UL (ref 0–0.2)
BASOPHILS RELATIVE PERCENT: 0 %
BILIRUB SERPL-MCNC: 1.6 MG/DL (ref 0–1)
BUN BLDV-MCNC: 36 MG/DL (ref 7–20)
BUN BLDV-MCNC: 36 MG/DL (ref 7–20)
CALCIUM SERPL-MCNC: 7.7 MG/DL (ref 8.3–10.6)
CALCIUM SERPL-MCNC: 7.9 MG/DL (ref 8.3–10.6)
CHLORIDE BLD-SCNC: 92 MMOL/L (ref 99–110)
CHLORIDE BLD-SCNC: 93 MMOL/L (ref 99–110)
CO2: 18 MMOL/L (ref 21–32)
CO2: 18 MMOL/L (ref 21–32)
CREAT SERPL-MCNC: 1.4 MG/DL (ref 0.6–1.2)
CREAT SERPL-MCNC: 1.5 MG/DL (ref 0.6–1.2)
CREATININE URINE: 75.1 MG/DL (ref 28–259)
EKG ATRIAL RATE: 138 BPM
EKG DIAGNOSIS: NORMAL
EKG Q-T INTERVAL: 362 MS
EKG QRS DURATION: 90 MS
EKG QTC CALCULATION (BAZETT): 480 MS
EKG R AXIS: 62 DEGREES
EKG T AXIS: 10 DEGREES
EKG VENTRICULAR RATE: 106 BPM
EOSINOPHILS ABSOLUTE: 0 K/UL (ref 0–0.6)
EOSINOPHILS RELATIVE PERCENT: 0 %
GFR AFRICAN AMERICAN: 40
GFR AFRICAN AMERICAN: 44
GFR NON-AFRICAN AMERICAN: 33
GFR NON-AFRICAN AMERICAN: 36
GLUCOSE BLD-MCNC: 154 MG/DL (ref 70–99)
GLUCOSE BLD-MCNC: 179 MG/DL (ref 70–99)
GLUCOSE BLD-MCNC: 206 MG/DL (ref 70–99)
GLUCOSE BLD-MCNC: 209 MG/DL (ref 70–99)
HCT VFR BLD CALC: 33.2 % (ref 36–48)
HEMOGLOBIN: 11.3 G/DL (ref 12–16)
LACTIC ACID, SEPSIS: 1.6 MMOL/L (ref 0.4–1.9)
LACTIC ACID, SEPSIS: 2.3 MMOL/L (ref 0.4–1.9)
LYMPHOCYTES ABSOLUTE: 1.7 K/UL (ref 1–5.1)
LYMPHOCYTES RELATIVE PERCENT: 9 %
MAGNESIUM: 1.9 MG/DL (ref 1.8–2.4)
MCH RBC QN AUTO: 30.4 PG (ref 26–34)
MCHC RBC AUTO-ENTMCNC: 34.1 G/DL (ref 31–36)
MCV RBC AUTO: 89 FL (ref 80–100)
MONOCYTES ABSOLUTE: 0.9 K/UL (ref 0–1.3)
MONOCYTES RELATIVE PERCENT: 5 %
NEUTROPHILS ABSOLUTE: 15.9 K/UL (ref 1.7–7.7)
NEUTROPHILS RELATIVE PERCENT: 80 %
PDW BLD-RTO: 13.8 % (ref 12.4–15.4)
PERFORMED ON: ABNORMAL
PERFORMED ON: ABNORMAL
PLATELET # BLD: 108 K/UL (ref 135–450)
PLATELET SLIDE REVIEW: ABNORMAL
PMV BLD AUTO: 7.2 FL (ref 5–10.5)
POTASSIUM REFLEX MAGNESIUM: 3.2 MMOL/L (ref 3.5–5.1)
POTASSIUM SERPL-SCNC: 3.6 MMOL/L (ref 3.5–5.1)
RBC # BLD: 3.73 M/UL (ref 4–5.2)
RBC # BLD: NORMAL 10*6/UL
REPORT: NORMAL
SLIDE REVIEW: ABNORMAL
SODIUM BLD-SCNC: 122 MMOL/L (ref 136–145)
SODIUM BLD-SCNC: 124 MMOL/L (ref 136–145)
SODIUM URINE: <20 MMOL/L
TOTAL PROTEIN: 5.5 G/DL (ref 6.4–8.2)
WBC # BLD: 18.5 K/UL (ref 4–11)

## 2021-10-31 PROCEDURE — 83735 ASSAY OF MAGNESIUM: CPT

## 2021-10-31 PROCEDURE — 84300 ASSAY OF URINE SODIUM: CPT

## 2021-10-31 PROCEDURE — 36415 COLL VENOUS BLD VENIPUNCTURE: CPT

## 2021-10-31 PROCEDURE — 2580000003 HC RX 258: Performed by: INTERNAL MEDICINE

## 2021-10-31 PROCEDURE — 2580000003 HC RX 258: Performed by: EMERGENCY MEDICINE

## 2021-10-31 PROCEDURE — 83605 ASSAY OF LACTIC ACID: CPT

## 2021-10-31 PROCEDURE — U0005 INFEC AGEN DETEC AMPLI PROBE: HCPCS

## 2021-10-31 PROCEDURE — 87641 MR-STAPH DNA AMP PROBE: CPT

## 2021-10-31 PROCEDURE — 2060000000 HC ICU INTERMEDIATE R&B

## 2021-10-31 PROCEDURE — 80053 COMPREHEN METABOLIC PANEL: CPT

## 2021-10-31 PROCEDURE — 85025 COMPLETE CBC W/AUTO DIFF WBC: CPT

## 2021-10-31 PROCEDURE — 94761 N-INVAS EAR/PLS OXIMETRY MLT: CPT

## 2021-10-31 PROCEDURE — 84540 ASSAY OF URINE/UREA-N: CPT

## 2021-10-31 PROCEDURE — 6370000000 HC RX 637 (ALT 250 FOR IP): Performed by: INTERNAL MEDICINE

## 2021-10-31 PROCEDURE — 2700000000 HC OXYGEN THERAPY PER DAY

## 2021-10-31 PROCEDURE — 83935 ASSAY OF URINE OSMOLALITY: CPT

## 2021-10-31 PROCEDURE — U0003 INFECTIOUS AGENT DETECTION BY NUCLEIC ACID (DNA OR RNA); SEVERE ACUTE RESPIRATORY SYNDROME CORONAVIRUS 2 (SARS-COV-2) (CORONAVIRUS DISEASE [COVID-19]), AMPLIFIED PROBE TECHNIQUE, MAKING USE OF HIGH THROUGHPUT TECHNOLOGIES AS DESCRIBED BY CMS-2020-01-R: HCPCS

## 2021-10-31 PROCEDURE — 93010 ELECTROCARDIOGRAM REPORT: CPT | Performed by: INTERNAL MEDICINE

## 2021-10-31 PROCEDURE — 6360000002 HC RX W HCPCS: Performed by: EMERGENCY MEDICINE

## 2021-10-31 PROCEDURE — 82570 ASSAY OF URINE CREATININE: CPT

## 2021-10-31 PROCEDURE — 83036 HEMOGLOBIN GLYCOSYLATED A1C: CPT

## 2021-10-31 PROCEDURE — 6360000002 HC RX W HCPCS: Performed by: INTERNAL MEDICINE

## 2021-10-31 RX ORDER — POTASSIUM CHLORIDE 7.45 MG/ML
10 INJECTION INTRAVENOUS PRN
Status: DISCONTINUED | OUTPATIENT
Start: 2021-10-31 | End: 2021-11-02

## 2021-10-31 RX ORDER — MAGNESIUM SULFATE IN WATER 40 MG/ML
2000 INJECTION, SOLUTION INTRAVENOUS PRN
Status: DISCONTINUED | OUTPATIENT
Start: 2021-10-31 | End: 2021-11-08

## 2021-10-31 RX ORDER — SODIUM CHLORIDE 9 MG/ML
25 INJECTION, SOLUTION INTRAVENOUS PRN
Status: DISCONTINUED | OUTPATIENT
Start: 2021-10-31 | End: 2021-11-09 | Stop reason: HOSPADM

## 2021-10-31 RX ORDER — 0.9 % SODIUM CHLORIDE 0.9 %
1000 INTRAVENOUS SOLUTION INTRAVENOUS ONCE
Status: COMPLETED | OUTPATIENT
Start: 2021-10-31 | End: 2021-10-31

## 2021-10-31 RX ORDER — LOSARTAN POTASSIUM 50 MG/1
50 TABLET ORAL DAILY
Status: ON HOLD | COMMUNITY
End: 2021-11-09 | Stop reason: HOSPADM

## 2021-10-31 RX ORDER — BUDESONIDE 3 MG/1
3 CAPSULE, COATED PELLETS ORAL EVERY MORNING
COMMUNITY
End: 2022-04-08

## 2021-10-31 RX ORDER — LEVOTHYROXINE SODIUM 0.1 MG/1
100 TABLET ORAL DAILY
Status: DISCONTINUED | OUTPATIENT
Start: 2021-10-31 | End: 2021-11-09 | Stop reason: HOSPADM

## 2021-10-31 RX ORDER — NICOTINE POLACRILEX 4 MG
15 LOZENGE BUCCAL PRN
Status: DISCONTINUED | OUTPATIENT
Start: 2021-10-31 | End: 2021-11-09 | Stop reason: HOSPADM

## 2021-10-31 RX ORDER — DEXTROSE MONOHYDRATE 25 G/50ML
12.5 INJECTION, SOLUTION INTRAVENOUS PRN
Status: DISCONTINUED | OUTPATIENT
Start: 2021-10-31 | End: 2021-11-09 | Stop reason: HOSPADM

## 2021-10-31 RX ORDER — DEXTROSE MONOHYDRATE 50 MG/ML
100 INJECTION, SOLUTION INTRAVENOUS PRN
Status: DISCONTINUED | OUTPATIENT
Start: 2021-10-31 | End: 2021-11-09 | Stop reason: HOSPADM

## 2021-10-31 RX ORDER — ACETAMINOPHEN 650 MG/1
650 SUPPOSITORY RECTAL EVERY 6 HOURS PRN
Status: DISCONTINUED | OUTPATIENT
Start: 2021-10-31 | End: 2021-11-09 | Stop reason: HOSPADM

## 2021-10-31 RX ORDER — THIAMINE HYDROCHLORIDE 100 MG/ML
100 INJECTION, SOLUTION INTRAMUSCULAR; INTRAVENOUS ONCE
Status: COMPLETED | OUTPATIENT
Start: 2021-10-31 | End: 2021-10-31

## 2021-10-31 RX ORDER — ESCITALOPRAM OXALATE 10 MG/1
10 TABLET ORAL DAILY
COMMUNITY
End: 2022-07-13 | Stop reason: ALTCHOICE

## 2021-10-31 RX ORDER — LEVOTHYROXINE SODIUM 100 UG/1
112 CAPSULE ORAL DAILY
COMMUNITY

## 2021-10-31 RX ORDER — SODIUM CHLORIDE 0.9 % (FLUSH) 0.9 %
10 SYRINGE (ML) INJECTION EVERY 12 HOURS SCHEDULED
Status: DISCONTINUED | OUTPATIENT
Start: 2021-10-31 | End: 2021-11-09 | Stop reason: HOSPADM

## 2021-10-31 RX ORDER — CALCIUM CARBONATE 200(500)MG
500 TABLET,CHEWABLE ORAL 3 TIMES DAILY
Status: DISCONTINUED | OUTPATIENT
Start: 2021-10-31 | End: 2021-10-31

## 2021-10-31 RX ORDER — SODIUM CHLORIDE 0.9 % (FLUSH) 0.9 %
10 SYRINGE (ML) INJECTION PRN
Status: DISCONTINUED | OUTPATIENT
Start: 2021-10-31 | End: 2021-11-09 | Stop reason: HOSPADM

## 2021-10-31 RX ORDER — CARVEDILOL 6.25 MG/1
6.25 TABLET ORAL 2 TIMES DAILY
Status: DISCONTINUED | OUTPATIENT
Start: 2021-10-31 | End: 2021-11-04

## 2021-10-31 RX ORDER — ACETAMINOPHEN 325 MG/1
650 TABLET ORAL EVERY 6 HOURS PRN
Status: DISCONTINUED | OUTPATIENT
Start: 2021-10-31 | End: 2021-11-09 | Stop reason: HOSPADM

## 2021-10-31 RX ADMIN — INSULIN LISPRO 1 UNITS: 100 INJECTION, SOLUTION INTRAVENOUS; SUBCUTANEOUS at 06:23

## 2021-10-31 RX ADMIN — CARVEDILOL 6.25 MG: 6.25 TABLET, FILM COATED ORAL at 20:23

## 2021-10-31 RX ADMIN — PIPERACILLIN AND TAZOBACTAM 3375 MG: 3; .375 INJECTION, POWDER, LYOPHILIZED, FOR SOLUTION INTRAVENOUS at 20:31

## 2021-10-31 RX ADMIN — POTASSIUM CHLORIDE 10 MEQ: 10 INJECTION, SOLUTION INTRAVENOUS at 08:15

## 2021-10-31 RX ADMIN — SODIUM CHLORIDE, PRESERVATIVE FREE 10 ML: 5 INJECTION INTRAVENOUS at 10:42

## 2021-10-31 RX ADMIN — MAGNESIUM SULFATE HEPTAHYDRATE 2000 MG: 40 INJECTION, SOLUTION INTRAVENOUS at 02:18

## 2021-10-31 RX ADMIN — SODIUM CHLORIDE 1000 ML: 9 INJECTION, SOLUTION INTRAVENOUS at 05:28

## 2021-10-31 RX ADMIN — APIXABAN 2.5 MG: 2.5 TABLET, FILM COATED ORAL at 10:38

## 2021-10-31 RX ADMIN — PIPERACILLIN AND TAZOBACTAM 3375 MG: 3; .375 INJECTION, POWDER, LYOPHILIZED, FOR SOLUTION INTRAVENOUS at 05:31

## 2021-10-31 RX ADMIN — LEVOTHYROXINE SODIUM 100 MCG: 0.1 TABLET ORAL at 05:31

## 2021-10-31 RX ADMIN — ANTACID TABLETS 500 MG: 500 TABLET, CHEWABLE ORAL at 10:37

## 2021-10-31 RX ADMIN — CARVEDILOL 6.25 MG: 6.25 TABLET, FILM COATED ORAL at 01:21

## 2021-10-31 RX ADMIN — SODIUM CHLORIDE, PRESERVATIVE FREE 10 ML: 5 INJECTION INTRAVENOUS at 20:23

## 2021-10-31 RX ADMIN — THIAMINE HYDROCHLORIDE 100 MG: 100 INJECTION, SOLUTION INTRAMUSCULAR; INTRAVENOUS at 05:32

## 2021-10-31 RX ADMIN — APIXABAN 2.5 MG: 2.5 TABLET, FILM COATED ORAL at 20:23

## 2021-10-31 RX ADMIN — SODIUM CHLORIDE, PRESERVATIVE FREE 10 ML: 5 INJECTION INTRAVENOUS at 10:43

## 2021-10-31 RX ADMIN — PIPERACILLIN AND TAZOBACTAM 3375 MG: 3; .375 INJECTION, POWDER, LYOPHILIZED, FOR SOLUTION INTRAVENOUS at 14:51

## 2021-10-31 RX ADMIN — INSULIN LISPRO 2 UNITS: 100 INJECTION, SOLUTION INTRAVENOUS; SUBCUTANEOUS at 02:34

## 2021-10-31 RX ADMIN — DEXTROSE MONOHYDRATE 150 MG: 50 INJECTION, SOLUTION INTRAVENOUS at 02:00

## 2021-10-31 ASSESSMENT — PAIN SCALES - GENERAL
PAINLEVEL_OUTOF10: 0

## 2021-10-31 NOTE — ED NOTES
Bed: 26  Expected date:   Expected time:   Means of arrival:   Comments:  Medic Yuan Perez  10/30/21 2006

## 2021-10-31 NOTE — H&P
Hospital Medicine History & Physical      PCP: Fermin Ledbetter MD    Date of Admission: 10/30/2021    Date of Service: Pt seen/examined on 10/30/2021  Pt seen/examined face to face on and admitted as inpatient with expected LOS greater than two midnights due to medical therapy    Chief Complaint:    Chief Complaint   Patient presents with    Nausea     pt to ED with nausea, fatigue, weakness, diarrhea, for three days. pt given zofran 4 mg PIV by EMS. pt from home.  Fatigue        History Of Present Illness:      79 y.o. female who presented to [facility] with past medical history hypertension, hypothyroidism presented to the ED due to nausea for the past 4 days with intermittent vomiting diarrhea and feels overall ill with having some abdominal pain that is generalized in her body but mostly in the abdomen. Patient was recent aggressive atrial fibrillation has been taking Eliquis daily. Patient also reports that she is Covid vaccinated. CODE STATUS: Patient is a full code. Past Medical History:          Diagnosis Date    Hypertension     Thyroid disease     hypothyroidism       Past Surgical History:          Procedure Laterality Date    HEMORRHOID SURGERY      HYSTERECTOMY      KNEE ARTHROSCOPY Right 3/10/2016    TONSILLECTOMY         Medications Prior to Admission:      Prior to Admission medications    Medication Sig Start Date End Date Taking?  Authorizing Provider   carvedilol (COREG) 6.25 MG tablet Take 1 tablet by mouth 2 times daily 10/27/21   Oli Fisherx, DO   apixaban (ELIQUIS) 5 MG TABS tablet Take 1 tablet by mouth 2 times daily 10/27/21   Oli Billdox, DO   methylPREDNISolone (MEDROL DOSEPACK) 4 MG tablet Take 1 Package by mouth  Patient not taking: Reported on 10/27/2021 2/21/18   Historical Provider, MD   predniSONE (DELTASONE) 10 MG tablet Take 10 mg by mouth  Patient not taking: Reported on 10/27/2021 2/26/18   Historical Provider, MD   ibuprofen (ADVIL;MOTRIN) 800 MG tablet TAKE 1 TABLET BY MOUTH THREE TIMES DAILY( EVERY EIGHT HOURS) 4/4/16   Simone Gregory MD   SYNTHROID 100 MCG tablet Take 100 mcg by mouth daily  5/4/15   Historical Provider, MD   losartan-hydrochlorothiazide (HYZAAR) 50-12.5 MG per tablet Take 0.5 tablets by mouth 2 times daily  4/20/15   Historical Provider, MD       Allergies:  Shellfish-derived products    Social History:          TOBACCO:   reports that she quit smoking about 41 years ago. She has never used smokeless tobacco.  ETOH:   reports current alcohol use of about 12.0 - 14.0 standard drinks of alcohol per week. E-Cigarettes/Vaping Use     Questions Responses    E-Cigarette/Vaping Use     Start Date     Passive Exposure     Quit Date     Counseling Given     Comments             Family History:      Reviewed in detail, noncontributory    History reviewed. No pertinent family history. REVIEW OF SYSTEMS:     Constitutional:  No Fever, No Chills, No Night Sweats  ENT/Mouth:  No Nasal Congestion,  No Hoarseness, No new mouth lesion  Eyes:  No Eye Pain, No Redness, No Discharge  Cardiovascular:  No Chest Pain, No Orthopnea, No Palpitations  Respiratory:  No Cough, No Sputum, No Dyspnea  Gastrointestinal: + Vomiting, + diarrhea, + abdominal pain  Genitourinary: No Urinary Frequency, No Hematuria, No Urinary pain  Musculoskeletal:  No worsening Arthralgias, No worsening Myalgias  Skin:  No new Skin Lesions, No new skin rash  Neuro:  No new weakness, No new numbness. Psych:  No suicial ideation, No Violence ideation    PHYSICAL EXAM PERFORMED:    /66   Pulse 108   Temp 98.1 °F (36.7 °C) (Oral)   Resp 26   Ht 5' 6\" (1.676 m)   Wt 150 lb (68 kg)   SpO2 95%   BMI 24.21 kg/m²     General appearance:  mild acute distress, appears older than stated age  HEENT:   atraumatic, sclera anicteric, Conjunctivae clear. Neck: Supple,Trachea midline, no goiter  Respiratory:minimal accessory muscle usage, Normal respiratory effort.  Clear to auscultation, bilaterally without wheezing  Cardiovascular:  Regular rate and rhythm, capillary refill 2 seconds  Abdomen: Soft, minimal generalized abdominal tender without guarding negative Rovsing and negative Velasco, non-distended with normal bowel sounds. Musculoskeletal:  No clubbing, cyanosis. trace edema LE bilaterally. Skin: turgor normal.  No new rashes or lesions. Neurologic: Alert and oriented x4, no new focal sensory/motor deficits. Labs:     Recent Labs     10/30/21  2100   WBC 21.2*   HGB 12.2   HCT 36.5        Recent Labs     10/30/21  2100   *   K 3.5   CL 89*   CO2 20*   BUN 38*   CREATININE 1.7*   CALCIUM 8.4     Recent Labs     10/30/21  2100   AST 35   ALT 32   BILITOT 1.6*   ALKPHOS 90     No results for input(s): INR in the last 72 hours. Recent Labs     10/30/21  2100   TROPONINI <0.01       Urinalysis:      Lab Results   Component Value Date    NITRU Negative 10/30/2021    WBCUA >100 10/30/2021    BACTERIA 3+ 10/30/2021    RBCUA see below 10/30/2021    BLOODU MODERATE 10/30/2021    SPECGRAV 1.020 10/30/2021    GLUCOSEU Negative 10/30/2021       Radiology:     CXR: I have reviewed the CXR with the following interpretation:   Mild cardiomegaly with COPD  EKG:  I have reviewed the EKG with the following interpretation:   Atrial fibrillation    CT ABDOMEN PELVIS WO CONTRAST Additional Contrast? None   Final Result   1. No acute finding in the abdomen or pelvis. 2. Cholelithiasis and diverticulosis noted incidentally. 3. Trace pleural effusions in the lower chest with peripheral airspace   opacities. Correlate with any pulmonary symptoms. A developing viral   infection or pulmonary edema may be considered. XR CHEST PORTABLE   Preliminary Result   Mild cardiomegaly and COPD. No other cardiopulmonary disease.              ASSESSMENT AND PLAN:    Active Hospital Problems    Diagnosis Date Noted    Sepsis ([facility] [address] [A41.9] 10/30/2021     Sepsis:  Tachycardia leukocytosis  Etiology suspected cystitis  COVID-19 pending, patient vaccinated  CT abdomen showing cholelithiasis with diverticulosis with no acute findings    Acute renal failure:  Prerenal, IVF and recheck  Nephrology consulted, much appreciated    Acute cystitis:  Antibiotic as above    Lower lung peripheral space opacities: Evident on CT  COVID-19 pending    Hyponatremia:  Nephrology consulted for assistance  Urine lites pending    Diarrhea:  C.diff rule out    HypoK: replaced  HypoCal: replaced  Hypomagnesemia: Replaced     Diet: advance as tolerated    DVT Prophylaxis: Eliquis    Dispo:   Expected LOS greater than two Ernestine 1153, DO

## 2021-10-31 NOTE — PROGRESS NOTES
Hospitalist Progress Note      PCP: Jerry Lucas MD    Date of Admission: 10/30/2021    Chief Complaint: Nausea, fatigue and diarrhea     Subjective:   Patient states that she is feeling much better. She was able to eat some breakfast and was able to do so without any nausea. She states improvement in her abdominal pain. She states that she sometimes becomes short of breath and has some chilling. She denies chest pain. Patient did have an episode of A. Fib with RVR last night with HR in the 130-140. Received a 150 mg bolus of amiodarone with improvement. Medications:  Reviewed    Infusion Medications    sodium chloride      dextrose      sodium chloride       Scheduled Medications    sodium chloride flush  10 mL IntraVENous 2 times per day    piperacillin-tazobactam  3,375 mg IntraVENous Q8H    insulin lispro  0-6 Units SubCUTAneous Q4H    carvedilol  6.25 mg Oral BID    levothyroxine  100 mcg Oral Daily    apixaban  2.5 mg Oral BID    calcium carbonate  500 mg Oral TID    sodium chloride flush  5-40 mL IntraVENous 2 times per day     PRN Meds: sodium chloride flush, sodium chloride, potassium chloride, magnesium sulfate, acetaminophen **OR** acetaminophen, glucose, dextrose, glucagon (rDNA), dextrose, sodium chloride flush, sodium chloride, ondansetron      Intake/Output Summary (Last 24 hours) at 10/31/2021 0947  Last data filed at 10/31/2021 0402  Gross per 24 hour   Intake 360 ml   Output --   Net 360 ml       Physical Exam Performed:    /62   Pulse 104   Temp 98.6 °F (37 °C) (Oral)   Resp 20   Ht 5' 6\" (1.676 m)   Wt 158 lb 11.7 oz (72 kg)   SpO2 97%   BMI 25.62 kg/m²     General appearance: No apparent distress, appears stated age and cooperative, drowsy. HEENT: Conjunctivae/corneas clear. Neck: No jugular venous distention. Respiratory:  Normal respiratory effort. Clear to auscultation, bilaterally without Rales/Wheezes/Rhonchi.   Cardiovascular: Regular rate and rhythm with normal S1/S2 without murmurs, rubs or gallops. Abdomen: Soft, non-tender, non-distended with normal bowel sounds. Musculoskeletal: No clubbing, cyanosis or edema bilaterally. Skin: Skin color, texture, turgor normal.  No rashes or lesions. Neurologic:  Neurovascularly intact without any focal sensory/motor deficits. Psychiatric: Alert and oriented, thought content appropriate, normal insight      Labs:   Recent Labs     10/30/21  2100 10/31/21  0321   WBC 21.2* 18.5*   HGB 12.2 11.3*   HCT 36.5 33.2*    108*     Recent Labs     10/30/21  2100 10/31/21  0124 10/31/21  0321   * 124* 122*   K 3.5 3.6 3.2*   CL 89* 93* 92*   CO2 20* 18* 18*   BUN 38* 36* 36*   CREATININE 1.7* 1.5* 1.4*   CALCIUM 8.4 7.9* 7.7*     Recent Labs     10/30/21  2100 10/31/21  0321   AST 35 30   ALT 32 28   BILITOT 1.6* 1.6*   ALKPHOS 90 86     No results for input(s): INR in the last 72 hours. Recent Labs     10/30/21  2100   TROPONINI <0.01       Urinalysis:      Lab Results   Component Value Date    NITRU Negative 10/30/2021    WBCUA >100 10/30/2021    BACTERIA 3+ 10/30/2021    RBCUA see below 10/30/2021    BLOODU MODERATE 10/30/2021    SPECGRAV 1.020 10/30/2021    GLUCOSEU Negative 10/30/2021       Radiology:  CT ABDOMEN PELVIS WO CONTRAST Additional Contrast? None   Final Result   1. No acute finding in the abdomen or pelvis. 2. Cholelithiasis and diverticulosis noted incidentally. 3. Trace pleural effusions in the lower chest with peripheral airspace   opacities. Correlate with any pulmonary symptoms. A developing viral   infection or pulmonary edema may be considered. XR CHEST PORTABLE   Preliminary Result   Mild cardiomegaly and COPD. No other cardiopulmonary disease. Assessment/Plan:    Active Hospital Problems    Diagnosis     Sepsis ([facility] [address] [A41.9]      1. Sepsis   - Etiology unknown.  Likely secondary to cystitis   - Improved lactate from 2.4 to 1.6  - WBC has improved from 21.2 to 18.5  - CXR showed no acute process  - Cultures pending   - Covid pending. Has been vaccinated   - Vancomycin x1 on 10/30  - Continue Zosyn     2. SHAY  - Etiology unknown. Likely secondary to dehydration   - Urine studies pending   - Nephrology consulted   - Baseline Cr unknown   - Mild improvement with IVF     3. Acute Cystitis   - Culture pending   - Continue Zosyn     4. Hyponatremia   - Mild improvement with IVF   - 122 on admission, 124 today     5. A. Fib with RVR   - Rate controlled post bolus of amiodarone last night   - Continue Eliquis     6. Diarrhea  - C diff pending     7. Electrolyte disturbances   - Hypokalemia: improved   - Hypocalcemia: corrected Ca was 8.3  - Hypomagnesemia: improving     8. Hyperglycemia  - A1C pending   - Will continue to monitor       DVT Prophylaxis: Eliquis   Diet: ADULT DIET;  Regular  Code Status: Full Code    PT/OT Eval Status: evaluate and treat    Dispo - pending clinical improvement     Dorothea Hutton DO

## 2021-10-31 NOTE — ED PROVIDER NOTES
Emergency Physician Note        Note Open Time: 8:58 PM EDT    Chief Complaint  Nausea (pt to ED with nausea, fatigue, weakness, diarrhea, for three days. pt given zofran 4 mg PIV by EMS. pt from home. ) and Fatigue       History of Present Illness  Alexa Leyden is a 79 y.o. female who presents to the ED for nausea. Patient reports has had nausea for the last 4 days. She states she had off-and-on vomiting and diarrhea throughout that time as well. She states she just feels sick. She has difficulty elaborating at all. She is aware of her history of atrial fibrillation and states she was informed of it 2 weeks ago and is taking Eliquis. Patient denies any respiratory symptoms. She is Covid vaccinated. Neither the emesis nor the diarrhea is bloody or black. 10 systems reviewed, pertinent positives per HPI otherwise noted to be negative    I have reviewed the following from the nursing documentation:      Prior to Admission medications    Medication Sig Start Date End Date Taking?  Authorizing Provider   carvedilol (COREG) 6.25 MG tablet Take 1 tablet by mouth 2 times daily 10/27/21   Oli Fisherx, DO   apixaban (ELIQUIS) 5 MG TABS tablet Take 1 tablet by mouth 2 times daily 10/27/21   Oli Billdox, DO   methylPREDNISolone (MEDROL DOSEPACK) 4 MG tablet Take 1 Package by mouth  Patient not taking: Reported on 10/27/2021 2/21/18   Historical Provider, MD   predniSONE (DELTASONE) 10 MG tablet Take 10 mg by mouth  Patient not taking: Reported on 10/27/2021 2/26/18   Historical Provider, MD   ibuprofen (ADVIL;MOTRIN) 800 MG tablet TAKE 1 TABLET BY MOUTH THREE TIMES DAILY( EVERY EIGHT HOURS) 4/4/16   Hakeem Leroy MD   SYNTHROID 100 MCG tablet Take 100 mcg by mouth daily  5/4/15   Historical Provider, MD   losartan-hydrochlorothiazide (HYZAAR) 50-12.5 MG per tablet Take 0.5 tablets by mouth 2 times daily  4/20/15   Historical Provider, MD       Allergies as of 10/30/2021 - Fully Reviewed 10/30/2021   Allergen Reaction Noted    Shellfish-derived products Shortness Of Breath and Hives 2013       Past Medical History:   Diagnosis Date    Hypertension     Thyroid disease     hypothyroidism        Surgical History:   Past Surgical History:   Procedure Laterality Date    HEMORRHOID SURGERY      HYSTERECTOMY      KNEE ARTHROSCOPY Right 3/10/2016    TONSILLECTOMY          Family History:  History reviewed. No pertinent family history. Social History     Socioeconomic History    Marital status:      Spouse name: Not on file    Number of children: Not on file    Years of education: Not on file    Highest education level: Not on file   Occupational History    Not on file   Tobacco Use    Smoking status: Former Smoker     Quit date: 3/7/1980     Years since quittin.6    Smokeless tobacco: Never Used   Substance and Sexual Activity    Alcohol use: Yes     Alcohol/week: 12.0 - 14.0 standard drinks     Types: 12 - 14 Glasses of wine per week    Drug use: No    Sexual activity: Not on file   Other Topics Concern    Not on file   Social History Narrative    Not on file     Social Determinants of Health     Financial Resource Strain:     Difficulty of Paying Living Expenses:    Food Insecurity:     Worried About Running Out of Food in the Last Year:     [address] in the Last Year:    Transportation Needs:     Lack of Transportation (Medical):      Lack of Transportation (Non-Medical):    Physical Activity:     Days of Exercise per Week:     Minutes of Exercise per Session:    Stress:     Feeling of Stress :    Social Connections:     Frequency of Communication with Friends and Family:     Frequency of Social Gatherings with Friends and Family:     Attends Baptism Services:     Active Member of Clubs or Organizations:     Attends Club or Organization Meetings:     Marital Status:    Intimate Partner Violence:     Fear of Current or Ex-Partner:     Emotionally Abused:     Physically Abused:     Sexually Abused:        Nursing notes reviewed. ED Triage Vitals [10/30/21 2009]   Enc Vitals Group      BP       Pulse 116      Resp 18      Temp 98.7 °F (37.1 °C)      Temp Source Oral      SpO2 92 %      Weight 150 lb (68 kg)      Height 5' 6\" (1.676 m)      Head Circumference       Peak Flow       Pain Score       Pain Loc       Pain Edu? Excl. in [address]? GENERAL:  Awake, alert. Well developed, well nourished with no apparent distress. HENT:  Normocephalic, Atraumatic, moist mucous membranes. EYES:  Pupils equal round and reactive to light, Conjunctiva normal, extraocular movements normal.  NECK:  No meningeal signs, Supple. CHEST: Tachycardic and irregularly irregular, chest wall non-tender. LUNGS:  Clear to auscultation bilaterally. ABDOMEN:  Soft, non-tender, no rebound, rigidity or guarding, non-distended, normal bowel sounds. No costovertebral angle tenderness to palpation. BACK:  No tenderness. EXTREMITIES:  Normal range of motion, no edema, no bony tenderness, no deformity, distal pulses present. SKIN: Warm, dry and intact. NEUROLOGIC: Normal mental status. Moving all extremities to command. LABS and DIAGNOSTIC RESULTS  EKG  The Ekg interpreted by me shows  atrial fibrillation with a rate of 106  Axis is   Normal  QTc is  normal  Frequent unifocal PVCs  ST Segments: no acute change  Delta waves, Brugada Syndrome, and Short TN are not present. No significant change from prior EKG dated 10/27/21    RADIOLOGY  X-RAYS:  I have reviewed radiologic plain film image(s). ALL OTHER NON-PLAIN FILM IMAGES SUCH AS CT, ULTRASOUND AND MRI HAVE BEEN READ BY THE RADIOLOGIST. XR CHEST PORTABLE   Preliminary Result   Mild cardiomegaly and COPD. No other cardiopulmonary disease.          CT ABDOMEN PELVIS WO CONTRAST Additional Contrast? None    (Results Pending)        LABS  Labs Reviewed   CBC WITH AUTO DIFFERENTIAL - Abnormal; Notable for the following components: Result Value    WBC 21.2 (*)     Neutrophils Absolute 20.1 (*)     Lymphocytes Absolute 0.8 (*)     Bands Relative 21 (*)     Dohle Bodies Present (*)     Vacuolated Neutrophils Present (*)     Anisocytosis 1+ (*)     All other components within normal limits    Narrative:     Performed at:  [address],  [place], [address]   Phone [phone]   COMPREHENSIVE METABOLIC PANEL W/ REFLEX TO MG FOR LOW K - Abnormal; Notable for the following components:    Sodium 122 (*)     Chloride 89 (*)     CO2 20 (*)     Glucose 230 (*)     BUN 38 (*)     CREATININE 1.7 (*)     GFR Non- 29 (*)     GFR  35 (*)     Total Protein 5.9 (*)     Albumin 3.0 (*)     Albumin/Globulin Ratio 1.0 (*)     Total Bilirubin 1.6 (*)     All other components within normal limits    Narrative:     Performed at:  [address],  [place], [employer]   Phone [phone] - Abnormal; Notable for the following components:    Pro-BNP 7,318 (*)     All other components within normal limits    Narrative:     Performed at:  [address],  [place], [employer]   Phone [phone]   LACTATE, SEPSIS - Abnormal; Notable for the following components:    Lactic Acid, Sepsis 2.4 (*)     All other components within normal limits    Narrative:     Performed at:  [address],  [place], [address]   Phone [phone]   URINE RT REFLEX TO CULTURE - Abnormal; Notable for the following components:    Clarity, UA SL CLOUDY (*)     Blood, Urine MODERATE (*)     Protein, UA 30 (*)     Leukocyte Esterase, Urine LARGE (*)     All other components within normal limits    Narrative:     Performed at:  [address],  [place], [employer] [place]   Phone [phone] MICROSCOPIC URINALYSIS - Abnormal; Notable for the following components:    WBC, UA >100 (*)     RBC, UA see below (*)     Bacteria, UA 3+ (*)     All other components within normal limits    Narrative:     Performed at:  [facility]) [address],  [place], [facility] [employer]   Phone [phone]   MAGNESIUM - Abnormal; Notable for the following components:    Magnesium 1.60 (*)     All other components within normal limits    Narrative:     Performed at:  [address],  [place], [facility] [employer]   Phone [phone]   CULTURE, BLOOD 1   CULTURE, BLOOD 2   CULTURE, URINE   C DIFF TOXIN/ANTIGEN   TROPONIN    Narrative:     Performed at:  [address],  [place], [facility] [employer]   Phone [phone]   LACTATE, SEPSIS   URINALYSIS   COVID-19       MEDICAL DECISION MAKING          The total Critical Care time is 45 minutes which excludes separately billable procedures. The critical care was concerning IV fluid bolus and IV antibiotics for treatment of UTI and renal failure. This time is exclusive of any time documented by any other providers. I spoke with Dr. Brandon Quintero. We thoroughly discussed the history, physical exam, laboratory and imaging studies, as well as, emergency department course. Based upon that discussion, we've decided to admit [facility] for further observation and evaluation of Marcos Ellison's ARF. As I have deemed necessary from their history, physical, and studies, I have considered and evaluated [facility] for the following diagnoses:        FINAL IMPRESSION  1. Acute renal failure, unspecified acute renal failure type ([address]    2. Rapid atrial fibrillation ([address]    3. Urinary tract infection with hematuria, site unspecified    4. Hypomagnesemia    5.  Nausea, vomiting, and diarrhea        Vitals:  Blood pressure 98/63, pulse 106, temperature 98.7 °F (37.1 °C), temperature source Oral, resp. rate 22, height 5' 6\" (1.676 m), weight 150 lb (68 kg), SpO2 96 %. Disposition  Pt is in stable condition upon Admit to telemetry. This chart was generated using the iSnap dictation system. I created this record but it may contain dictation errors.           Parth Burns MD  10/30/21 8669

## 2021-10-31 NOTE — PROGRESS NOTES
Patient admitted to room 427 from ED. Patient oriented to room, call light, bed rails, phone, lights and bathroom. Patient instructed about the schedule of the day including: vital sign frequency, lab draws, possible tests, frequency of MD and staff rounds, including RN/MD rounding together at bedside, daily weights, and I &O's. Patient instructed about prescribed diet, how to use 8MENU, and television. Bed alarm in place, patient aware of placement and reason. Telemetry box in place, patient aware of placement and reason. Bed locked, in lowest position, side rails up 2/4, call light within reach. Will continue to monitor.

## 2021-10-31 NOTE — PROGRESS NOTES
Communication w/ Dr. Henry Cunningham via [employer]    10/31/21 12:40 AM   [phone] Hospital or Facility: [facility] From: [facility] RE: Deniz Finn 1940 RM: [id number] Pt admitted for sepsis r/t uti. Pt has severe n/v and diarrhea. Pt is requesting water, but there are no diet orders. Okay to give po fluids? Need Callback: NO CALLBACK [facility]      Dr. Henry Cunningham:  10/31/21 12:41 AM   Thats fine . Ill put in orders as soon as I sit down     Beth RN:  10/31/21 12:41 AM   Thanks! Read 12:41 AM   10/31/21 12:45 AM   fyi, pt requesting liquid meal replacement w/ meals like ensure  Read 12:49 AM   10/31/21 1:27 AM   Pt a+o x4, denies chest pain, is in afib rvr. Just gave ordered dose of coreg. Any other orders? Read 1:27 AM       Dr. Henry Cunningham:  10/31/21 1:28 AM   It was rare controlled in the 90s did that change? 10/31/21 1:28 AM   Whats her current vital signs? 0'\"   10/31/21 1:29 AM   Hr 130s to 140s, bp 112/54  Read 1:29 AM     Beth RN:  10/31/21 1:29 AM   when she was in the ed she was in the low 100s to 110, on the floor she jumped up  Read 1:29 AM     Dr. Henry Cunningham:  10/31/21 1:29 AM   Lets give amiodarone 150 bolus once pleaae    Beth RN:  10/31/21 1:29 AM   okay  Read 1:29 AM   10/31/21 1:47 AM   Just wanted to double check, mag replacement hadn't been given because vanc hadn't finished until now, still give amiodarone or give mag? Read 1:50 AM     Dr. Henry Cunningham:  10/31/21 1:50 AM   Amio . If possible lets get a second line as well     Beth RN:  10/31/21 1:51 AM   okay  Read 1:56 AM   0'\"   10/31/21 4:37 [employer] [facility] Dr. Alexei Morocho noticed that the pts bp has been trending downwards. 85/52 left upper arm, 87/60 right upper arm. Pt a+o x4 still. Read 4:51 AM   0'\"   10/31/21 4:39 AM   She had a large unmeasured urine output in her briefs. She denies incontinence. I told her to call me next time so I can catch it in a bedpan and measure it.   Read 4:51 AM     Dr. Henry Cunningham:  10/31/21 4:52 AM Lets place an external catheter. I put in for bolus    Beth RN:  10/31/21 4:53 AM   Okay, thank you! Read 4:53 AM   0'\"   10/31/21 5:00 AM   also, her potassium came back low (3.2) in her labs from 0320  Read 5:00 AM       Dr. Zak Woodward:  10/31/21 5:00 AM   Patient has prn :)    Beth RN:  10/31/21 5:01 AM   okay, sorry to bother you!   Read 5:03 AM

## 2021-10-31 NOTE — PROGRESS NOTES
4 Eyes Skin Assessment     The patient is being assess for   Admission    I agree that 2 RN's have performed a thorough Head to Toe Skin Assessment on the patient. ALL assessment sites listed below have been assessed. Areas assessed for pressure by both nurses:   [x]   Head, Face, and Ears   [x]   Shoulders, Back, and Chest, Abdomen  [x]   Arms, Elbows, and Hands   [x]   Coccyx, Sacrum, and Ischium  [x]   Legs, Feet, and Heels        Skin Assessed Under all Medical Devices by both nurses:  O2 device tubing              All Mepilex Borders were peeled back and area peeked at by both nurses:  No: N/A  Please list where Mepilex Borders are located:  N/A             **SHARE this note so that the co-signing nurse is able to place an eSignature**    Co-signer eSignature: {Esignature:[id number]}    Does the Patient have Skin Breakdown related to pressure?   No     (Insert Photo here: N/A)         Jared Prevention initiated:  {YES/NO:19732}   Wound Care Orders initiated:  {YES/NO:19732}      [facility] nurse consulted for Pressure Injury (Stage 3,4, Unstageable, DTI, NWPT, Complex wounds)and New or Established Ostomies:  {YES/NO:19732}      Primary Nurse eSignature: Beth JEWELL

## 2021-11-01 LAB
A/G RATIO: 0.8 (ref 1.1–2.2)
ALBUMIN SERPL-MCNC: 2.5 G/DL (ref 3.4–5)
ALP BLD-CCNC: 99 U/L (ref 40–129)
ALT SERPL-CCNC: 28 U/L (ref 10–40)
ANION GAP SERPL CALCULATED.3IONS-SCNC: 11 MMOL/L (ref 3–16)
ANION GAP SERPL CALCULATED.3IONS-SCNC: 12 MMOL/L (ref 3–16)
ANION GAP SERPL CALCULATED.3IONS-SCNC: 14 MMOL/L (ref 3–16)
AST SERPL-CCNC: 26 U/L (ref 15–37)
BASOPHILS ABSOLUTE: 0 K/UL (ref 0–0.2)
BASOPHILS RELATIVE PERCENT: 0.1 %
BILIRUB SERPL-MCNC: 1.4 MG/DL (ref 0–1)
BUN BLDV-MCNC: 32 MG/DL (ref 7–20)
BUN BLDV-MCNC: 33 MG/DL (ref 7–20)
BUN BLDV-MCNC: 35 MG/DL (ref 7–20)
CALCIUM SERPL-MCNC: 7.8 MG/DL (ref 8.3–10.6)
CALCIUM SERPL-MCNC: 8 MG/DL (ref 8.3–10.6)
CALCIUM SERPL-MCNC: 8.3 MG/DL (ref 8.3–10.6)
CHLORIDE BLD-SCNC: 93 MMOL/L (ref 99–110)
CHLORIDE BLD-SCNC: 94 MMOL/L (ref 99–110)
CHLORIDE BLD-SCNC: 94 MMOL/L (ref 99–110)
CO2: 18 MMOL/L (ref 21–32)
CO2: 19 MMOL/L (ref 21–32)
CO2: 23 MMOL/L (ref 21–32)
CREAT SERPL-MCNC: 1.2 MG/DL (ref 0.6–1.2)
CREAT SERPL-MCNC: 1.3 MG/DL (ref 0.6–1.2)
CREAT SERPL-MCNC: 1.3 MG/DL (ref 0.6–1.2)
CREATININE URINE: 63.7 MG/DL (ref 28–259)
EOSINOPHILS ABSOLUTE: 0.3 K/UL (ref 0–0.6)
EOSINOPHILS RELATIVE PERCENT: 1.6 %
ESTIMATED AVERAGE GLUCOSE: 116.9 MG/DL
GFR AFRICAN AMERICAN: 48
GFR AFRICAN AMERICAN: 48
GFR AFRICAN AMERICAN: 52
GFR NON-AFRICAN AMERICAN: 39
GFR NON-AFRICAN AMERICAN: 39
GFR NON-AFRICAN AMERICAN: 43
GLUCOSE BLD-MCNC: 111 MG/DL (ref 70–99)
GLUCOSE BLD-MCNC: 146 MG/DL (ref 70–99)
GLUCOSE BLD-MCNC: 163 MG/DL (ref 70–99)
HBA1C MFR BLD: 5.7 %
HCT VFR BLD CALC: 35.2 % (ref 36–48)
HEMOGLOBIN: 12.1 G/DL (ref 12–16)
LYMPHOCYTES ABSOLUTE: 0.7 K/UL (ref 1–5.1)
LYMPHOCYTES RELATIVE PERCENT: 3.9 %
MAGNESIUM: 2.5 MG/DL (ref 1.8–2.4)
MCH RBC QN AUTO: 30.3 PG (ref 26–34)
MCHC RBC AUTO-ENTMCNC: 34.4 G/DL (ref 31–36)
MCV RBC AUTO: 88.1 FL (ref 80–100)
MONOCYTES ABSOLUTE: 1.6 K/UL (ref 0–1.3)
MONOCYTES RELATIVE PERCENT: 9.1 %
MRSA SCREEN RT-PCR: NORMAL
NEUTROPHILS ABSOLUTE: 15 K/UL (ref 1.7–7.7)
NEUTROPHILS RELATIVE PERCENT: 85.3 %
OSMOLALITY URINE: 391 MOSM/KG (ref 390–1070)
PDW BLD-RTO: 14 % (ref 12.4–15.4)
PLATELET # BLD: 124 K/UL (ref 135–450)
PMV BLD AUTO: 8.3 FL (ref 5–10.5)
POTASSIUM REFLEX MAGNESIUM: 3.4 MMOL/L (ref 3.5–5.1)
POTASSIUM SERPL-SCNC: 3.3 MMOL/L (ref 3.5–5.1)
POTASSIUM SERPL-SCNC: 3.3 MMOL/L (ref 3.5–5.1)
RBC # BLD: 4 M/UL (ref 4–5.2)
SARS-COV-2, PCR: NOT DETECTED
SODIUM BLD-SCNC: 125 MMOL/L (ref 136–145)
SODIUM BLD-SCNC: 126 MMOL/L (ref 136–145)
SODIUM BLD-SCNC: 127 MMOL/L (ref 136–145)
SODIUM URINE: <20 MMOL/L
TOTAL PROTEIN: 5.8 G/DL (ref 6.4–8.2)
UREA NITROGEN, UR: 695.3 MG/DL (ref 800–1666)
URIC ACID, SERUM: 4.3 MG/DL (ref 2.6–6)
WBC # BLD: 17.6 K/UL (ref 4–11)

## 2021-11-01 PROCEDURE — 2500000003 HC RX 250 WO HCPCS: Performed by: HOSPITALIST

## 2021-11-01 PROCEDURE — 99222 1ST HOSP IP/OBS MODERATE 55: CPT | Performed by: HOSPITALIST

## 2021-11-01 PROCEDURE — 6360000002 HC RX W HCPCS: Performed by: INTERNAL MEDICINE

## 2021-11-01 PROCEDURE — 2060000000 HC ICU INTERMEDIATE R&B

## 2021-11-01 PROCEDURE — 84300 ASSAY OF URINE SODIUM: CPT

## 2021-11-01 PROCEDURE — 84550 ASSAY OF BLOOD/URIC ACID: CPT

## 2021-11-01 PROCEDURE — 2700000000 HC OXYGEN THERAPY PER DAY

## 2021-11-01 PROCEDURE — 94761 N-INVAS EAR/PLS OXIMETRY MLT: CPT

## 2021-11-01 PROCEDURE — 82570 ASSAY OF URINE CREATININE: CPT

## 2021-11-01 PROCEDURE — 2580000003 HC RX 258: Performed by: INTERNAL MEDICINE

## 2021-11-01 PROCEDURE — 83735 ASSAY OF MAGNESIUM: CPT

## 2021-11-01 PROCEDURE — 80053 COMPREHEN METABOLIC PANEL: CPT

## 2021-11-01 PROCEDURE — 51798 US URINE CAPACITY MEASURE: CPT

## 2021-11-01 PROCEDURE — 2580000003 HC RX 258: Performed by: HOSPITALIST

## 2021-11-01 PROCEDURE — 2580000003 HC RX 258: Performed by: EMERGENCY MEDICINE

## 2021-11-01 PROCEDURE — 6370000000 HC RX 637 (ALT 250 FOR IP)

## 2021-11-01 PROCEDURE — 83935 ASSAY OF URINE OSMOLALITY: CPT

## 2021-11-01 PROCEDURE — 36415 COLL VENOUS BLD VENIPUNCTURE: CPT

## 2021-11-01 PROCEDURE — 85025 COMPLETE CBC W/AUTO DIFF WBC: CPT

## 2021-11-01 PROCEDURE — 6370000000 HC RX 637 (ALT 250 FOR IP): Performed by: INTERNAL MEDICINE

## 2021-11-01 RX ORDER — CALCIUM CARBONATE 200(500)MG
500 TABLET,CHEWABLE ORAL 3 TIMES DAILY PRN
Status: DISCONTINUED | OUTPATIENT
Start: 2021-11-01 | End: 2021-11-09 | Stop reason: HOSPADM

## 2021-11-01 RX ADMIN — ANTACID TABLETS 500 MG: 500 TABLET, CHEWABLE ORAL at 17:45

## 2021-11-01 RX ADMIN — LEVOTHYROXINE SODIUM 100 MCG: 0.1 TABLET ORAL at 06:16

## 2021-11-01 RX ADMIN — PIPERACILLIN AND TAZOBACTAM 3375 MG: 3; .375 INJECTION, POWDER, LYOPHILIZED, FOR SOLUTION INTRAVENOUS at 13:21

## 2021-11-01 RX ADMIN — APIXABAN 2.5 MG: 2.5 TABLET, FILM COATED ORAL at 09:18

## 2021-11-01 RX ADMIN — PIPERACILLIN AND TAZOBACTAM 3375 MG: 3; .375 INJECTION, POWDER, LYOPHILIZED, FOR SOLUTION INTRAVENOUS at 20:27

## 2021-11-01 RX ADMIN — SODIUM CHLORIDE, PRESERVATIVE FREE 10 ML: 5 INJECTION INTRAVENOUS at 20:27

## 2021-11-01 RX ADMIN — CARVEDILOL 6.25 MG: 6.25 TABLET, FILM COATED ORAL at 20:26

## 2021-11-01 RX ADMIN — CARVEDILOL 6.25 MG: 6.25 TABLET, FILM COATED ORAL at 09:18

## 2021-11-01 RX ADMIN — ACETAMINOPHEN 650 MG: 325 TABLET ORAL at 16:43

## 2021-11-01 RX ADMIN — SODIUM BICARBONATE: 84 INJECTION, SOLUTION INTRAVENOUS at 09:36

## 2021-11-01 RX ADMIN — APIXABAN 2.5 MG: 2.5 TABLET, FILM COATED ORAL at 20:27

## 2021-11-01 RX ADMIN — ACETAMINOPHEN 650 MG: 325 TABLET ORAL at 09:19

## 2021-11-01 RX ADMIN — SODIUM CHLORIDE, PRESERVATIVE FREE 10 ML: 5 INJECTION INTRAVENOUS at 09:19

## 2021-11-01 RX ADMIN — SODIUM CHLORIDE 25 ML: 9 INJECTION, SOLUTION INTRAVENOUS at 13:20

## 2021-11-01 RX ADMIN — PIPERACILLIN AND TAZOBACTAM 3375 MG: 3; .375 INJECTION, POWDER, LYOPHILIZED, FOR SOLUTION INTRAVENOUS at 06:15

## 2021-11-01 ASSESSMENT — PAIN DESCRIPTION - DESCRIPTORS: DESCRIPTORS: HEADACHE

## 2021-11-01 ASSESSMENT — PAIN SCALES - GENERAL
PAINLEVEL_OUTOF10: 5
PAINLEVEL_OUTOF10: 0
PAINLEVEL_OUTOF10: 0
PAINLEVEL_OUTOF10: 5

## 2021-11-01 ASSESSMENT — PAIN DESCRIPTION - PAIN TYPE: TYPE: ACUTE PAIN

## 2021-11-01 NOTE — PROGRESS NOTES
Pt  called, asking to know what results of pcr covid results are.  told that it generally takes two days for pcr results to come back. Will notify  if results come back during my shift.

## 2021-11-01 NOTE — PROGRESS NOTES
/72   Pulse 94   Temp 98 °F (36.7 °C) (Oral)   Resp 20   Ht 5' 6\" (1.676 m)   Wt 155 lb 13.8 oz (70.7 kg)   SpO2 93%   BMI 25.16 kg/m²  on 1L O2 per nasal cannula. Pt A&O x4, resting quietly in bed, complaints of \"exhaustion. \"  Pt with complaints of headache \"5\", prn tylenol given per pt request.  Pt denies shortness of breath. Lungs diminished. Atrial Fibrillation on tele. Pt assisted with repositioning. IVF's infusing at ordered rate on MAR. Pt denies any needs at this time. Bedside table, call light within reach. Bed alarm in place and activated. Pt instructed to call out for any needs and assistance. Will continue to monitor.   Alexandria Herrera RN  11/1/2021

## 2021-11-01 NOTE — CARE COORDINATION
CASE MANAGEMENT INITIAL ASSESSMENT      Reviewed chart and completed assessment via telephone with:  Explained Case Management role/services. To patient and spouse    Primary contact information:see below    Health Care Decision Maker :   Primary Decision Maker: [address] [place] - [phone]    Secondary Decision Maker: Emily Chau - [phone]          Can this person be reached and be able to respond quickly, such as within a few minutes or hours? Yes    Admit date/status: inpatient 10/30/2021  Lisa Chase  Is this a Readmission?:  Yes      Insurance:BCBS   Precert required for SNF: Yes       3 night stay required: No    Living arrangements, Adls, care needs, prior to admission:Lives at home with spouse    Transportation:TBD    Durable Medical Equipment at home:  Walker__Cane_X_RTS__ BSC__Shower Chair__  02__ HHN__ CPAP__  BiPap__  Hospital Bed__ W/C___ Other__________    Services in the home and/or outpatient, prior to admission:None      PT/OT recs:Not seen    Hospital Exemption Notification (HEN):N/A    Barriers to discharge: None    Plan/comments:Patient denies needs. IPTA , still drives, she plans to return home with her spouse at discharge.      ECOC on chart for MD signature

## 2021-11-01 NOTE — PROGRESS NOTES
Occupational Therapy/ Physical Therapy  No Treatment  OT/PT eval and treat order received and appreciated. Chart review complete. Upon attempt for eval pt RN requested pt to hold eval today. Will follow up on 11/2 as medically/ clinically appropriate. Genesis Angeles.  [address] [facility], OTR/L #[id number]  Jean Rowe, PT

## 2021-11-01 NOTE — CONSULTS
Nephrology Consult Note                                                                                                                                                                                                                                                                                                                                                               Office : [phone]     Fax :[phone]              Patient's Name: Jeanette Yarbrough  11:45 AM  11/1/2021    Reason for Consult:  Hyponatremia,  Requesting Physician:  Kira Bowen MD      Chief Complaint:  [place], diarrhea    History of Present Ilness:    Jeanette Yarbrough is a 79 y.o. female with PMH of HTN , hypothyroidism ,Afib     Presented to ED with c/o nausea  Vomiting and diarrhea   Onset last 4-5 days  C/o weakness    Nephrology consult for hyponatremia management      Past Medical History:   Diagnosis Date    Hypertension     Thyroid disease     hypothyroidism       Past Surgical History:   Procedure Laterality Date    HEMORRHOID SURGERY      HYSTERECTOMY      KNEE ARTHROSCOPY Right 3/10/2016    TONSILLECTOMY         History reviewed. No pertinent family history. reports that she quit smoking about 41 years ago. She has never used smokeless tobacco. She reports current alcohol use of about 12.0 - 14.0 standard drinks of alcohol per week. She reports that she does not use drugs.     Allergies:  Shellfish-derived products    Current Medications:    sodium bicarbonate 100 mEq in sodium chloride 0.9 % 1,000 mL infusion, Continuous  sodium chloride flush 0.9 % injection 10 mL, 2 times per day  sodium chloride flush 0.9 % injection 10 mL, PRN  0.9 % sodium chloride infusion, PRN  potassium chloride 10 mEq/100 mL IVPB (Peripheral Line), PRN  magnesium sulfate 2000 mg in 50 mL IVPB premix, PRN  acetaminophen (TYLENOL) tablet 650 mg, Q6H PRN   Or  acetaminophen (TYLENOL) suppository 650 mg, Q6H PRN  piperacillin-tazobactam (ZOSYN) 3,375 mg in dextrose 5 % 50 mL IVPB (mini-bag), Q8H  glucose (GLUTOSE) 40 % oral gel 15 g, PRN  dextrose 50 % IV solution, PRN  glucagon (rDNA) injection 1 mg, PRN  dextrose 5 % solution, PRN  carvedilol (COREG) tablet 6.25 mg, BID  levothyroxine (SYNTHROID) tablet 100 mcg, Daily  apixaban (ELIQUIS) tablet 2.5 mg, BID  sodium chloride flush 0.9 % injection 5-40 mL, 2 times per day  sodium chloride flush 0.9 % injection 5-40 mL, PRN  0.9 % sodium chloride infusion, PRN  ondansetron (ZOFRAN) injection 4 mg, Q1H PRN        Review of Systems:   14 point ROS obtained but were negative except mentioned in HPI      Physical exam:     Vitals:  /72   Pulse 94   Temp 98 °F (36.7 °C) (Oral)   Resp 20   Ht 5' 6\" (1.676 m)   Wt 155 lb 13.8 oz (70.7 kg)   SpO2 93%   BMI 25.16 kg/m²   Constitutional:  OAA X3 NAD  Skin: no rash, turgor wnl  Heent:  eomi, mmm  Neck: no bruits or jvd noted  Cardiovascular:  S1, S2 without m/r/g  Respiratory: CTA B without w/r/r  Abdomen:  +bs, soft, nt, nd  Ext: no  lower extremity edema  Psychiatric: mood and affect appropriate  Musculoskeletal:  Rom, muscular strength intact    Data:   Labs:  CBC:   Recent Labs     10/30/21  2100 10/31/21  0321 11/01/21  0500   WBC 21.2* 18.5* 17.6*   HGB 12.2 11.3* 12.1    108* 124*     BMP:    Recent Labs     10/31/21  0124 10/31/21  0321 11/01/21  0500   * 122* 126*   K 3.6 3.2* 3.4*   CL 93* 92* 94*   CO2 18* 18* 18*   BUN 36* 36* 33*   CREATININE 1.5* 1.4* 1.3*   GLUCOSE 206* 179* 111*     Ca/Mg/Phos:   Recent Labs     10/30/21  2100 10/30/21  2100 10/31/21  0124 10/31/21  0321 11/01/21  0500   CALCIUM 8.4   < > 7.9* 7.7* 8.3   MG 1.60*  --   --  1.90 2.50*    < > = values in this interval not displayed.      Hepatic:   Recent Labs     10/30/21  2100 10/31/21  0321 11/01/21  0500   AST 35 30 26   ALT 32 28 28   BILITOT 1.6* 1.6* 1.4*   ALKPHOS 90 86 99     Troponin:   Recent Labs     10/30/21  2100   TROPONINI <0.01     BNP: No results for input(s): BNP in the last 72 hours. Lipids: No results for input(s): CHOL, TRIG, HDL, LDLCALC, LABVLDL in the last 72 hours. ABGs: No results for input(s): PHART, PO2ART, VBA9HYX in the last 72 hours. INR: No results for input(s): INR in the last 72 hours. UA:  Recent Labs     10/30/21  2100   COLORU Yellow   CLARITYU SL CLOUDY*   GLUCOSEU Negative   BILIRUBINUR Negative   KETUA Negative   SPECGRAV 1.020   BLOODU MODERATE*   PHUR 6.0   PROTEINU 30*   UROBILINOGEN 1.0   NITRU Negative   LEUKOCYTESUR LARGE*   LABMICR YES   URINETYPE NotGiven      Urine Microscopic:   Recent Labs     10/30/21  2100   BACTERIA 3+*   WBCUA >100*   RBCUA see below*     Urine Culture:   Recent Labs     10/30/21  2111   LABURIN >100,000 CFU/ml  Sensitivity to follow       Urine Chemistry:   Recent Labs     10/31/21  1807   LABCREA 75.1   NAUR <20             IMAGING:  CT ABDOMEN PELVIS WO CONTRAST Additional Contrast? None   Final Result   1. No acute finding in the abdomen or pelvis. 2. Cholelithiasis and diverticulosis noted incidentally. 3. Trace pleural effusions in the lower chest with peripheral airspace   opacities. Correlate with any pulmonary symptoms. A developing viral   infection or pulmonary edema may be considered. XR CHEST PORTABLE   Final Result   Mild cardiomegaly and COPD. No other cardiopulmonary disease. Assessment/Plan   1. Hyponatremia      Serum Na 122  -----> 126    Etiology could be hypovolemic hyponatremia due to vomiting , diarrhea   Poor solute intake VS SIADH    Volume status : hypovolemic side    Urine Na < 20     Plan    Will give sodium bicarb 100 meq + 0.9 NS @ 75 ml/hr    Encourage high protein/solute diet    Check Uosm    Moniter serum Na every 6 hr      2. NAGMA 2/2 diarrhea    Give bicarb    3.  CKD stage 3    homeroier    Serum cr stable    Avoid NSAIDs      4 N/V/D                            Thank you for allowing us to participate in care of Donal Lozada Leonie Marques MD  Feel free to contact me   [facility] [address]  Office : [phone]  Fax :[phone]

## 2021-11-01 NOTE — PROGRESS NOTES
Hospitalist Progress Note      PCP: Azam Marino MD    Date of Admission: 10/30/2021    Chief Complaint: Nausea, fatigue and diarrhea     Subjective:   Patient states that she is not feeling well this morning. She states that she is having lots of muscle aches. She also states she feels more short of breath. She denies nausea, vomiting, diarrhea or chills. She is eating ok. Medications:  Reviewed    Infusion Medications    IV infusion builder      sodium chloride      dextrose      sodium chloride       Scheduled Medications    sodium chloride flush  10 mL IntraVENous 2 times per day    piperacillin-tazobactam  3,375 mg IntraVENous Q8H    carvedilol  6.25 mg Oral BID    levothyroxine  100 mcg Oral Daily    apixaban  2.5 mg Oral BID    sodium chloride flush  5-40 mL IntraVENous 2 times per day     PRN Meds: sodium chloride flush, sodium chloride, potassium chloride, magnesium sulfate, acetaminophen **OR** acetaminophen, glucose, dextrose, glucagon (rDNA), dextrose, sodium chloride flush, sodium chloride, ondansetron      Intake/Output Summary (Last 24 hours) at 11/1/2021 0936  Last data filed at 11/1/2021 0918  Gross per 24 hour   Intake 1200 ml   Output 900 ml   Net 300 ml       Physical Exam Performed:    /72   Pulse 94   Temp 98 °F (36.7 °C) (Oral)   Resp 20   Ht 5' 6\" (1.676 m)   Wt 155 lb 13.8 oz (70.7 kg)   SpO2 93%   BMI 25.16 kg/m²     General appearance: No apparent distress, appears stated age and cooperative, drowsy. HEENT: Conjunctivae/corneas clear. Neck: No jugular venous distention. Respiratory:  Normal respiratory effort. Clear to auscultation, bilaterally without Rales/Wheezes/Rhonchi. Cardiovascular: Regular rate and rhythm with normal S1/S2 without murmurs, rubs or gallops. Abdomen: Soft, non-distended with normal bowel sounds. Mild tenderness  Musculoskeletal: No clubbing, cyanosis or edema bilaterally.    Skin: Skin color, texture, turgor normal.  No rashes or lesions. Neurologic:  Neurovascularly intact without any focal sensory/motor deficits. Psychiatric: Alert and oriented, thought content appropriate, normal insight      Labs:   Recent Labs     10/30/21  2100 10/31/21  0321 11/01/21  0500   WBC 21.2* 18.5* 17.6*   HGB 12.2 11.3* 12.1   HCT 36.5 33.2* 35.2*    108* 124*     Recent Labs     10/31/21  0124 10/31/21  0321 11/01/21  0500   * 122* 126*   K 3.6 3.2* 3.4*   CL 93* 92* 94*   CO2 18* 18* 18*   BUN 36* 36* 33*   CREATININE 1.5* 1.4* 1.3*   CALCIUM 7.9* 7.7* 8.3     Recent Labs     10/30/21  2100 10/31/21  0321 11/01/21  0500   AST 35 30 26   ALT 32 28 28   BILITOT 1.6* 1.6* 1.4*   ALKPHOS 90 86 99     No results for input(s): INR in the last 72 hours. Recent Labs     10/30/21  2100   TROPONINI <0.01       Urinalysis:      Lab Results   Component Value Date    NITRU Negative 10/30/2021    WBCUA >100 10/30/2021    BACTERIA 3+ 10/30/2021    RBCUA see below 10/30/2021    BLOODU MODERATE 10/30/2021    SPECGRAV 1.020 10/30/2021    GLUCOSEU Negative 10/30/2021       Radiology:  CT ABDOMEN PELVIS WO CONTRAST Additional Contrast? None   Final Result   1. No acute finding in the abdomen or pelvis. 2. Cholelithiasis and diverticulosis noted incidentally. 3. Trace pleural effusions in the lower chest with peripheral airspace   opacities. Correlate with any pulmonary symptoms. A developing viral   infection or pulmonary edema may be considered. XR CHEST PORTABLE   Final Result   Mild cardiomegaly and COPD. No other cardiopulmonary disease. Assessment/Plan:    Active Hospital Problems    Diagnosis     Sepsis ([address] [A41.9]      1. Acute E. coli cystitis with Bacteremia POA  - WBC has improved from 21.2 to 17.6  - CXR showed no acute process  - Blood cultures x2 grew E.  Coli , pending sensitivities   - Urine culture showed E. coli   - Covid negative   - Vancomycin x1 on 10/30  - Continue Zosyn until sensitivities return      2. SHAY POA  - Etiology unknown. Likely secondary to dehydration, sepsis   - Urine studies pending   - Nephrology following and assisting with management- Mild improvement with IVF   - Cr of 1.3 from 1.7 on admission     3. Electrolyte disturbance   - Hyponatremia: mild improvement. 122 on admission, 126 today  - Hypokalemia: 3.4 today will continue to monitor, likely due to diarrhea  - Hypocalcemia: resolved with medial therapy   - Hypomagnesemia: resolved with medical therapy    - Nephrology following and assisting with management    4. A. Fib with RVR   - Rate controlled post bolus of amiodarone last night   - Continue Eliquis for stroke prophylaxis. 5. Diarrhea  - C diff pending   - No diarrhea for past 24 hours     6. Hyperglycemia  - A1C pending   - Will continue to monitor       DVT Prophylaxis: Eliquis   Diet: ADULT DIET; Regular  Code Status: Full Code    PT/OT Eval Status: evaluate and treat    Dispo -likely in 2 days pending clinical improvement     Rima Mckeon DO     Addendum to Resident  Progress note:  Pt seen,examined and evaluated with resident and discussed regarding POC . I have reviewed the current history, physical findings, labs and assessment and plan and agree with note as documented by resident DO ( Dr. Diann Marcano)    Patient seen and examined with  at bedside . The patient and / or the family were informed of the results of tests, a time was given to answer questions, a plan was proposed and they agreed with plan. Continue current IV antibiotics and follow culture sensitivities. Will request PT OT evaluation to assess discharge needs.     Lexi Winter MD  Hospitalist Physician

## 2021-11-01 NOTE — PROGRESS NOTES
Pt requesting something to aide in burping, requesting tums. Writer sent message to Dr. Pietro Marie regarding. Kaylie Casanova RN  11/1/2021    See new order for prn tums.   Kaylie Casanova RN  11/1/2021

## 2021-11-01 NOTE — PROGRESS NOTES
/63   Pulse 90   Temp 97.6 °F (36.4 °C) (Oral)   Resp 20   Ht 5' 6\" (1.676 m)   Wt 155 lb 13.8 oz (70.7 kg)   SpO2 95%   BMI 25.16 kg/m²  on 1L O2 per nasal cannula. Pt resting quietly in bed, with  at bedside. Pt denies pain, discomfort or shortness of breath, only complaint \"unable to breathe deep. \"  Lungs diminished. Atrial flutter on tele at this time. Writer encouraged pt to turn and reposition frequently, explained importance with pt verbalizing understanding; writer assisting as needed. IVF's continue to infuse at ordered rate 75mL/hr. Pt denies any needs at this time. Bedside table, call light within reach. Bed alarm in place and activated. Pt instructed to call out for needs and assistance. Will continue to monitor.   Jeannie Howe RN  11/1/2021

## 2021-11-02 LAB
ANION GAP SERPL CALCULATED.3IONS-SCNC: 10 MMOL/L (ref 3–16)
BANDED NEUTROPHILS RELATIVE PERCENT: 34 % (ref 0–7)
BASOPHILS ABSOLUTE: 0 K/UL (ref 0–0.2)
BASOPHILS RELATIVE PERCENT: 0 %
BLOOD CULTURE, ROUTINE: ABNORMAL
BLOOD CULTURE, ROUTINE: ABNORMAL
BUN BLDV-MCNC: 27 MG/DL (ref 7–20)
CALCIUM SERPL-MCNC: 8.1 MG/DL (ref 8.3–10.6)
CHLORIDE BLD-SCNC: 93 MMOL/L (ref 99–110)
CO2: 24 MMOL/L (ref 21–32)
CREAT SERPL-MCNC: 1.2 MG/DL (ref 0.6–1.2)
CULTURE, BLOOD 2: ABNORMAL
EOSINOPHILS ABSOLUTE: 0 K/UL (ref 0–0.6)
EOSINOPHILS RELATIVE PERCENT: 0 %
GFR AFRICAN AMERICAN: 52
GFR NON-AFRICAN AMERICAN: 43
GLUCOSE BLD-MCNC: 111 MG/DL (ref 70–99)
HCT VFR BLD CALC: 35.6 % (ref 36–48)
HEMOGLOBIN: 12.3 G/DL (ref 12–16)
LYMPHOCYTES ABSOLUTE: 1.2 K/UL (ref 1–5.1)
LYMPHOCYTES RELATIVE PERCENT: 7 %
MAGNESIUM: 2.2 MG/DL (ref 1.8–2.4)
MCH RBC QN AUTO: 30.5 PG (ref 26–34)
MCHC RBC AUTO-ENTMCNC: 34.5 G/DL (ref 31–36)
MCV RBC AUTO: 88.4 FL (ref 80–100)
MONOCYTES ABSOLUTE: 1.4 K/UL (ref 0–1.3)
MONOCYTES RELATIVE PERCENT: 8 %
NEUTROPHILS ABSOLUTE: 14.7 K/UL (ref 1.7–7.7)
NEUTROPHILS RELATIVE PERCENT: 51 %
ORGANISM: ABNORMAL
OSMOLALITY URINE: 365 MOSM/KG (ref 390–1070)
PDW BLD-RTO: 14 % (ref 12.4–15.4)
PLATELET # BLD: 139 K/UL (ref 135–450)
PLATELET SLIDE REVIEW: ADEQUATE
PMV BLD AUTO: 8.5 FL (ref 5–10.5)
POTASSIUM REFLEX MAGNESIUM: 3.3 MMOL/L (ref 3.5–5.1)
RBC # BLD: 4.03 M/UL (ref 4–5.2)
SLIDE REVIEW: ABNORMAL
SODIUM BLD-SCNC: 127 MMOL/L (ref 136–145)
URINE CULTURE, ROUTINE: ABNORMAL
WBC # BLD: 17.3 K/UL (ref 4–11)

## 2021-11-02 PROCEDURE — 97166 OT EVAL MOD COMPLEX 45 MIN: CPT

## 2021-11-02 PROCEDURE — 99232 SBSQ HOSP IP/OBS MODERATE 35: CPT | Performed by: HOSPITALIST

## 2021-11-02 PROCEDURE — 97530 THERAPEUTIC ACTIVITIES: CPT

## 2021-11-02 PROCEDURE — 2580000003 HC RX 258: Performed by: INTERNAL MEDICINE

## 2021-11-02 PROCEDURE — 6370000000 HC RX 637 (ALT 250 FOR IP): Performed by: INTERNAL MEDICINE

## 2021-11-02 PROCEDURE — 6360000002 HC RX W HCPCS: Performed by: INTERNAL MEDICINE

## 2021-11-02 PROCEDURE — 2060000000 HC ICU INTERMEDIATE R&B

## 2021-11-02 PROCEDURE — 2580000003 HC RX 258: Performed by: HOSPITALIST

## 2021-11-02 PROCEDURE — 97535 SELF CARE MNGMENT TRAINING: CPT

## 2021-11-02 PROCEDURE — 85025 COMPLETE CBC W/AUTO DIFF WBC: CPT

## 2021-11-02 PROCEDURE — 2500000003 HC RX 250 WO HCPCS: Performed by: HOSPITALIST

## 2021-11-02 PROCEDURE — 2700000000 HC OXYGEN THERAPY PER DAY

## 2021-11-02 PROCEDURE — 83735 ASSAY OF MAGNESIUM: CPT

## 2021-11-02 PROCEDURE — 94761 N-INVAS EAR/PLS OXIMETRY MLT: CPT

## 2021-11-02 PROCEDURE — 97162 PT EVAL MOD COMPLEX 30 MIN: CPT

## 2021-11-02 PROCEDURE — 80048 BASIC METABOLIC PNL TOTAL CA: CPT

## 2021-11-02 PROCEDURE — 36415 COLL VENOUS BLD VENIPUNCTURE: CPT

## 2021-11-02 PROCEDURE — 6370000000 HC RX 637 (ALT 250 FOR IP): Performed by: HOSPITALIST

## 2021-11-02 RX ORDER — POTASSIUM CHLORIDE 20 MEQ/1
20 TABLET, EXTENDED RELEASE ORAL ONCE
Status: COMPLETED | OUTPATIENT
Start: 2021-11-02 | End: 2021-11-02

## 2021-11-02 RX ORDER — SODIUM CHLORIDE 1000 MG
1 TABLET, SOLUBLE MISCELLANEOUS 2 TIMES DAILY WITH MEALS
Status: DISCONTINUED | OUTPATIENT
Start: 2021-11-02 | End: 2021-11-04

## 2021-11-02 RX ADMIN — CARVEDILOL 6.25 MG: 6.25 TABLET, FILM COATED ORAL at 08:14

## 2021-11-02 RX ADMIN — PIPERACILLIN AND TAZOBACTAM 3375 MG: 3; .375 INJECTION, POWDER, LYOPHILIZED, FOR SOLUTION INTRAVENOUS at 06:35

## 2021-11-02 RX ADMIN — ACETAMINOPHEN 650 MG: 325 TABLET ORAL at 08:15

## 2021-11-02 RX ADMIN — SODIUM CHLORIDE, PRESERVATIVE FREE 10 ML: 5 INJECTION INTRAVENOUS at 20:16

## 2021-11-02 RX ADMIN — CARVEDILOL 6.25 MG: 6.25 TABLET, FILM COATED ORAL at 20:16

## 2021-11-02 RX ADMIN — LEVOTHYROXINE SODIUM 100 MCG: 0.1 TABLET ORAL at 06:35

## 2021-11-02 RX ADMIN — CEFTRIAXONE SODIUM 2000 MG: 2 INJECTION, POWDER, FOR SOLUTION INTRAMUSCULAR; INTRAVENOUS at 12:45

## 2021-11-02 RX ADMIN — APIXABAN 2.5 MG: 2.5 TABLET, FILM COATED ORAL at 08:14

## 2021-11-02 RX ADMIN — Medication 1 G: at 13:42

## 2021-11-02 RX ADMIN — APIXABAN 2.5 MG: 2.5 TABLET, FILM COATED ORAL at 20:15

## 2021-11-02 RX ADMIN — SODIUM CHLORIDE 25 ML: 9 INJECTION, SOLUTION INTRAVENOUS at 12:44

## 2021-11-02 RX ADMIN — POTASSIUM CHLORIDE 20 MEQ: 20 TABLET, EXTENDED RELEASE ORAL at 18:06

## 2021-11-02 RX ADMIN — SODIUM BICARBONATE: 84 INJECTION, SOLUTION INTRAVENOUS at 00:55

## 2021-11-02 RX ADMIN — SODIUM CHLORIDE, PRESERVATIVE FREE 10 ML: 5 INJECTION INTRAVENOUS at 08:16

## 2021-11-02 ASSESSMENT — PAIN DESCRIPTION - PAIN TYPE
TYPE: ACUTE PAIN
TYPE: ACUTE PAIN

## 2021-11-02 ASSESSMENT — PAIN DESCRIPTION - DESCRIPTORS
DESCRIPTORS: SORE
DESCRIPTORS: ACHING

## 2021-11-02 ASSESSMENT — PAIN DESCRIPTION - LOCATION
LOCATION: GENERALIZED
LOCATION: GENERALIZED

## 2021-11-02 ASSESSMENT — PAIN SCALES - WONG BAKER
WONGBAKER_NUMERICALRESPONSE: 4
WONGBAKER_NUMERICALRESPONSE: 4

## 2021-11-02 ASSESSMENT — PAIN DESCRIPTION - FREQUENCY: FREQUENCY: CONTINUOUS

## 2021-11-02 NOTE — PROGRESS NOTES
Nephrology Progress  Note                                                                                                                                                                                                                                                                                                                                                               Office : [phone]     Fax :[phone]              Patient's Name: Verónica Vegas  12:54 PM  11/2/2021    Reason for Consult:  Hyponatremia,  Requesting Physician:  Jericho Wilosn MD      Chief Complaint:  N, V, diarrhea      Interval History :     Serum Na 125 ---> 127   Bicarb improved to 24  No diarrhea  Sitting in chair , not in acute distress  No vomiting        History of Present Ilness:    Verónica Vegas is a 79 y.o. female with PMH of HTN , hypothyroidism ,Afib     Presented to ED with c/o nausea  Vomiting and diarrhea   Onset last 4-5 days  C/o weakness    Nephrology consult for hyponatremia management      Past Medical History:   Diagnosis Date    Hypertension     Thyroid disease     hypothyroidism       Past Surgical History:   Procedure Laterality Date    HEMORRHOID SURGERY      HYSTERECTOMY      KNEE ARTHROSCOPY Right 3/10/2016    TONSILLECTOMY         History reviewed. No pertinent family history. reports that she quit smoking about 41 years ago. She has never used smokeless tobacco. She reports current alcohol use of about 12.0 - 14.0 standard drinks of alcohol per week. She reports that she does not use drugs.     Allergies:  Shellfish-derived products    Current Medications:    cefTRIAXone (ROCEPHIN) 2000 mg IVPB in D5W 50ml minibag, Q24H  calcium carbonate (TUMS) chewable tablet 500 mg, TID PRN  sodium chloride flush 0.9 % injection 10 mL, 2 times per day  sodium chloride flush 0.9 % injection 10 mL, PRN  0.9 % sodium chloride infusion, PRN  potassium chloride 10 mEq/100 mL IVPB (Peripheral Line), PRN  magnesium sulfate 2000 mg in 50 mL IVPB premix, PRN  acetaminophen (TYLENOL) tablet 650 mg, Q6H PRN   Or  acetaminophen (TYLENOL) suppository 650 mg, Q6H PRN  glucose (GLUTOSE) 40 % oral gel 15 g, PRN  dextrose 50 % IV solution, PRN  glucagon (rDNA) injection 1 mg, PRN  dextrose 5 % solution, PRN  carvedilol (COREG) tablet 6.25 mg, BID  levothyroxine (SYNTHROID) tablet 100 mcg, Daily  apixaban (ELIQUIS) tablet 2.5 mg, BID  sodium chloride flush 0.9 % injection 5-40 mL, 2 times per day  sodium chloride flush 0.9 % injection 5-40 mL, PRN  0.9 % sodium chloride infusion, PRN  ondansetron (ZOFRAN) injection 4 mg, Q1H PRN        Review of Systems:   14 point ROS obtained but were negative except mentioned in HPI      Physical exam:     Vitals:  /63   Pulse 92   Temp 97.8 °F (36.6 °C) (Oral)   Resp 20   Ht 5' 6\" (1.676 m)   Wt 155 lb 13.8 oz (70.7 kg)   SpO2 96%   BMI 25.16 kg/m²   Constitutional:  OAA X3 NAD  Skin: no rash, turgor wnl  Heent:  eomi, mmm  Neck: no bruits or jvd noted  Cardiovascular:  S1, S2 without m/r/g  Respiratory: CTA B without w/r/r  Abdomen:  +bs, soft, nt, nd  Ext: no  lower extremity edema  Psychiatric: mood and affect appropriate  Musculoskeletal:  Rom, muscular strength intact    Data:   Labs:  CBC:   Recent Labs     10/31/21  0321 11/01/21  0500 11/02/21  0806   WBC 18.5* 17.6* 17.3*   HGB 11.3* 12.1 12.3   * 124* 139     BMP:    Recent Labs     11/01/21  1251 11/01/21  1928 11/02/21  0806   * 127* 127*   K 3.3* 3.3* 3.3*   CL 94* 93* 93*   CO2 19* 23 24   BUN 35* 32* 27*   CREATININE 1.3* 1.2 1.2   GLUCOSE 163* 146* 111*     Ca/Mg/Phos:   Recent Labs     10/31/21  0321 10/31/21  0321 11/01/21  0500 11/01/21  0500 11/01/21  1251 11/01/21  1928 11/02/21  0806   CALCIUM 7.7*   < > 8.3   < > 7.8* 8.0* 8.1*   MG 1.90  --  2.50*  --   --   --  2.20    < > = values in this interval not displayed.      Hepatic:   Recent Labs     10/30/21  2100 10/31/21  0321 11/01/21  0500   AST 35 30 26   ALT 32 28 28   BILITOT 1.6* 1.6* 1.4*   ALKPHOS 90 86 99     Troponin:   Recent Labs     10/30/21  2100   TROPONINI <0.01     BNP: No results for input(s): BNP in the last 72 hours. Lipids: No results for input(s): CHOL, TRIG, HDL, LDLCALC, LABVLDL in the last 72 hours. ABGs: No results for input(s): PHART, PO2ART, ZRR6OIG in the last 72 hours. INR: No results for input(s): INR in the last 72 hours. UA:  Recent Labs     10/30/21  2100   COLORU Yellow   CLARITYU SL CLOUDY*   GLUCOSEU Negative   BILIRUBINUR Negative   KETUA Negative   SPECGRAV 1.020   BLOODU MODERATE*   PHUR 6.0   PROTEINU 30*   UROBILINOGEN 1.0   NITRU Negative   LEUKOCYTESUR LARGE*   LABMICR YES   URINETYPE NotGiven      Urine Microscopic:   Recent Labs     10/30/21  2100   BACTERIA 3+*   WBCUA >100*   RBCUA see below*     Urine Culture:   Recent Labs     10/30/21  2111   LABURIN >100,000 CFU/ml     Urine Chemistry:   Recent Labs     10/31/21  1807 11/01/21  1645   LABCREA 75.1 63.7   NAUR <20 <20             IMAGING:  CT ABDOMEN PELVIS WO CONTRAST Additional Contrast? None   Final Result   1. No acute finding in the abdomen or pelvis. 2. Cholelithiasis and diverticulosis noted incidentally. 3. Trace pleural effusions in the lower chest with peripheral airspace   opacities. Correlate with any pulmonary symptoms. A developing viral   infection or pulmonary edema may be considered. XR CHEST PORTABLE   Final Result   Mild cardiomegaly and COPD. No other cardiopulmonary disease. Assessment/Plan   1. Hyponatremia      Serum Na 122  -----> 126    Etiology could be hypovolemic hyponatremia due to vomiting , diarrhea   Poor solute intake VS SIADH    Volume status : hypovolemic side    Urine Na < 20     Plan    Will give salt tab 1 gram BID    Encourage high protein/solute diet        Moniter serum Na every 12 h      2. NAGMA 2/2 diarrhea    improved    3.  CKD stage 3    [facility]    Serum cr stable    Avoid NSAIDs      4 N/V/D                            Thank you for allowing us to participate in care of Nadege Fowler MD  Feel free to contact me   [facility] [address]  Office : [phone]  Fax :[phone]

## 2021-11-02 NOTE — PROGRESS NOTES
Physical Therapy    Facility/Department: [facility] C4 PCU  Initial Assessment    NAME: Abigail Us  : 1940  MRN: [id number]    Date of Service: 2021    Discharge Recommendations:  24 hour supervision or assist, Home with Home health PT   PT Equipment Recommendations  Other: CTA as pt progresses with PT -- anticipate no equipment needs as pt improves during LOS    Assessment   Body structures, Functions, Activity limitations: Decreased functional mobility ; Decreased strength;Decreased endurance;Decreased balance  Assessment: Pt referred for PT evaluation during current hospital stay with dx of acute E. coli cystitis. Pt currently weak and deconditioned following period of prolonged bedrest in hospital, requiring CGA x 1 for transfers from bed>chair. Therapist suspects pt could ambulate although she declined to attempt today 2* fatigue. Recommend pt return home with 24-hr sup/assist from family and home PT. Treatment Diagnosis: Generalized weakness  Specific instructions for Next Treatment: Progress ther ex and mobility as tolerated  Prognosis: Good  Decision Making: Medium Complexity  PT Education: Goals; General Safety;PT Role;Plan of Care;Disease Specific Education; Functional Mobility Training;Precautions;Transfer Training;Energy Conservation;Equipment  Patient Education: Disease-specific education: Pt educated on role of PT in hospital and benefits of regular OOB mobility while in hospital to decrease risks of prolonged immobility. REQUIRES PT FOLLOW UP: Yes  Activity Tolerance: Patient limited by fatigue;Patient limited by endurance  Activity Tolerance: Pt self-limited with participation 2* c/o fatigue, mod encouragement needed throughout session for OOB activity. VS during session: BP = 107/71, HR = 115 bpm, O2 sat = 94% on 1L O2. Patient Diagnosis(es): The primary encounter diagnosis was Acute renal failure, unspecified acute renal failure type ([facility] [address]  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 walker for support, moving toward L side)     Ambulation  Ambulation?: No (pt declined to attempt today despite encouragement from therapist 2* fatigue)     Balance  Posture: Good  Sitting - Static: Good  Sitting - Dynamic: Good  Standing - Static: Good;-  Standing - Dynamic: Fair;+    Plan   Times per week: 3-5x/week in acute care  Times per day: Daily  Specific instructions for Next Treatment: Progress ther ex and mobility as tolerated  Current Treatment Recommendations: Strengthening, Balance Training, Endurance Training, Functional Mobility Training, Transfer Training, Gait Training, Home Exercise Program, Safety Education & Training, Equipment Evaluation, Education, & procurement, Stair training  Safety Devices:  All fall risk precautions in place, Left in chair, Call light within reach, Chair alarm in place, Nurse notified, Gait belt, Patient at risk for falls    AM-PAC Score  AM-[facility] Inpatient Mobility Raw Score : 17 (11/02/21 1126)  AM-PAC Inpatient T-Scale Score : 42.13 (11/02/21 1126)  Mobility Inpatient CMS 0-100% Score: 50.57 (11/02/21 1126)  Mobility Inpatient CMS G-Code Modifier : CK (11/02/21 1126)    Goals  Short term goals  Time Frame for Short term goals: 1 week, 11/09/21 (unless otherwise specified)  Short term goal 1: Pt will transfer supine <-> sit with modified(I)  Short term goal 2: Pt will transfer sit <-> stand and bed>chair with modified(I)  Short term goal 3: Pt will ambulate x 100 feet without AD with supervision  Short term goal 4: By 11/04/21: Pt will tolerate 12-15 reps BLE exercise for strengthening, balance, and endurance  Patient Goals   Patient goals : \"To go home and get to feeling better\"       Therapy Time   Individual Concurrent Group Co-treatment   Time In 0840         Time Out 0915         Minutes 35         Timed Code Treatment Minutes: [address], [place] #[id number]    If pt is unable to be seen after this session, please let this note serve as discharge summary. Please see case management note for discharge disposition. Thank you.

## 2021-11-02 NOTE — PROGRESS NOTES
/61   Pulse 103   Temp 97.3 °F (36.3 °C) (Oral)   Resp 20   Ht 5' 6\" (1.676 m)   Wt 155 lb 13.8 oz (70.7 kg)   SpO2 95%   BMI 25.16 kg/m²  on 2L O2 per nasal cannula. Pt resting quietly in bed. Pt denies pain, discomfort or shortness of breath at this time. Lungs diminished. Writer provided pt with incentive spirometer, instructed on importance of use with pt verbalizing understanding. Atrial Flutter on tele at this time. Pt denies any needs at this time. Bedside table, call light, fluids and belongings within reach. Pt instructed to call out for any needs and assistance. Bed alarm in place and activated. Will continue to monitor.   Robert Cam RN  11/2/2021

## 2021-11-02 NOTE — PROGRESS NOTES
Occupational Therapy   Occupational Therapy Initial Assessment/Treatment  Date: 2021   Patient Name: Samira Adames  MRN: [id number]     : 1940    Date of Service: 2021    Discharge Recommendations:  24 hour supervision or assist       Assessment   Performance deficits / Impairments: Decreased functional mobility ; Decreased ADL status  Patient from home, IPTA, dx of hypomagnesemia. After evaluation, pt found to be presenting with the above mentioned occupational performance deficits which are affecting participation in daily living skills. Pt CGA for functional transfers, maxA for LE ADLs with set-up for UE bathing and grooming seated in chair. Pt would benefit from continued skilled occupational therapy to address ADLs, functional mobility, and safety while in acute care. Prognosis: Good  Decision Making: Low Complexity  OT Education: OT Role;Plan of Care;ADL Adaptive Strategies;Transfer Training;Equipment  Patient Education: Disease specific: Pt educated on benefits of OOB activity to decrease risks associated with prolonged bedrest while in hospital. Pt verb understanding. REQUIRES OT FOLLOW UP: Yes  Activity Tolerance  Activity Tolerance: Patient Tolerated treatment well, c/o fatigue throughout session. Vitals: BP = 107/71, HR = 115 bpm, O2 sat = 94% on 1L O2. Safety Devices  Safety Devices in place: Yes  Type of devices: Nurse notified;Call light within reach; Chair alarm in place; Left in chair;Gait belt           Patient Diagnosis(es): The primary encounter diagnosis was Acute renal failure, unspecified acute renal failure type ([address] Diagnoses of Rapid atrial fibrillation ([address] Urinary tract infection with hematuria, site unspecified, Hypomagnesemia, and Nausea, vomiting, and diarrhea were also pertinent to this visit. has a past medical history of Hypertension and Thyroid disease. has a past surgical history that includes Hemorrhoid surgery; Hysterectomy;  Tonsillectomy; and Knee arthroscopy (Right, 3/10/2016). Restrictions  Restrictions/Precautions  Restrictions/Precautions: General Precautions, Fall Risk, Contact Precautions  Position Activity Restriction  Other position/activity restrictions: Contact precautions 2* C. diff r/o, high fall risk per nursing assessment, up with assistance, 1L O2, telemetry, IV lines    Subjective   General  Chart Reviewed: Yes  Patient assessed for rehabilitation services?: Yes  Additional Pertinent Hx: Pt admitted to ED with c/o nausea and vomiting. Admitted for ARF and treatment for renal failure. COVID (-). PMHx includes:  Hypertension and Thyroid disease.   Family / Caregiver Present: No  Referring Practitioner: Gaby Mcgowan DO 11/01/21  Diagnosis: hypomagnesemia  Subjective  Subjective: Pt pleasant and agreeable to OT evaluation, \"I just feel so tired\" \"I feel worse than I did yesterday and i stink\"  Patient Currently in Pain: Denies  Vital Signs  Pulse: 92  Heart Rate Source: Monitor  Resp: 20  BP: 100/63  BP Location: Left upper arm  Patient Position: Up in chair  Patient Currently in Pain: Denies  Oxygen Therapy  SpO2: 96 %  Pulse Oximeter Device Mode: Intermittent  Pulse Oximeter Device Location: Finger  O2 Device: Nasal cannula  O2 Flow Rate (L/min): 2 L/min  Social/Functional History  Social/Functional History  Lives With: Spouse  Type of Home: House  Home Layout: One level, Work area in basement  Home Access: Stairs to enter without rails  Entrance Stairs - Number of Steps: 1 CHER  Bathroom Shower/Tub: Walk-in shower  Bathroom Equipment: Built-in shower seat, Grab bars in shower  Home Equipment: Cane  ADL Assistance: Independent  Homemaking Assistance: Independent  Homemaking Responsibilities: Yes  Ambulation Assistance: Independent  Transfer Assistance: Independent  Active : Yes  Occupation: Retired  Additional Comments: 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 [facility]       Objective   Vision: Impaired  Vision Exceptions: Wears glasses for reading  Hearing: Within functional limits    Orientation  Overall Orientation Status: Within Functional Limits  Observation/Palpation  Posture: Good  Balance  Sitting Balance: Stand by assistance  Standing Balance: Contact guard assistance     ADL  Feeding: Independent  Grooming:  (oral care and hair care seated)  UE Bathing:  (hair and bathwipes)  LE Dressing: None  Toileting: Dependent/Total (external catheter/purewick)     Tone RUE  RUE Tone: Normotonic  Tone LUE  LUE Tone: Normotonic  Coordination  Movements Are Fluid And Coordinated: Yes     Bed mobility  Supine to Sit: Contact guard assistance (to L with HOB elevated and use of bed rails)  Sit to Supine:  (up to chair at end of session)     Transfers  Stand Step Transfers: Contact guard assistance (bed to chair with SW)  Sit to stand: Contact guard assistance (up to SW)  Stand to sit: Contact guard assistance     Cognition  Overall Cognitive Status: Exceptions  Initiation: Requires cues for some  Sequencing: Requires cues for some        LUE AROM (degrees)  LUE AROM : WFL  RUE AROM (degrees)  RUE AROM : WFL  LUE Strength  Gross LUE Strength: WFL  RUE Strength  Gross RUE Strength: WFL         Plan   Plan  Times per week: 3-5x's a week while in acute care      AM-PAC Score        AM-[facility] Inpatient Daily Activity Raw Score: 15 (11/02/21 1130)  AM-PAC Inpatient ADL T-Scale Score : 34.69 (11/02/21 1130)  ADL Inpatient CMS 0-100% Score: 56.46 (11/02/21 1130)  ADL Inpatient CMS G-Code Modifier : CK (11/02/21 1130)    Goals  Short term goals  Time Frame for Short term goals: 1 week unless otherwise specified 11/9  Short term goal 1: Pt will complete LE dressing with CGA  Short term goal 2: Pt will complete toilet transfers with SBA by 11/06  Short term goal 3: Pt will complete standing level ADLs with SBA  Patient Goals   Patient goals : \"to be able to get up and go home\"       Therapy Time   Individual Concurrent Group Co-treatment   Time In 0840         Time Out 0915         Minutes 35         Timed Code Treatment Minutes: 25 Minutes       Josefina Alejandra OTR/L  If pt is unable to be seen after this session, please let this note serve as discharge summary. Please see case management note for discharge disposition. Thank you.

## 2021-11-02 NOTE — PROGRESS NOTES
Hospitalist Progress Note      PCP: Isom Kayser, MD    Date of Admission: 10/30/2021    Chief Complaint: Nausea, fatigue and diarrhea     Subjective:   Patient states that she is not feeling well this morning. She states that she is having lots of muscle aches. She also states she feels more short of breath. She denies nausea, vomiting, diarrhea or chills. She is eating ok. Medications:  Reviewed    Infusion Medications    sodium chloride 25 mL (11/02/21 1244)    dextrose      sodium chloride       Scheduled Medications    cefTRIAXone (ROCEPHIN) IV  2,000 mg IntraVENous Q24H    sodium chloride  1 g Oral BID WC    sodium chloride flush  10 mL IntraVENous 2 times per day    carvedilol  6.25 mg Oral BID    levothyroxine  100 mcg Oral Daily    apixaban  2.5 mg Oral BID    sodium chloride flush  5-40 mL IntraVENous 2 times per day     PRN Meds: calcium carbonate, sodium chloride flush, sodium chloride, potassium chloride, magnesium sulfate, acetaminophen **OR** acetaminophen, glucose, dextrose, glucagon (rDNA), dextrose, sodium chloride flush, sodium chloride, ondansetron      Intake/Output Summary (Last 24 hours) at 11/2/2021 1323  Last data filed at 11/2/2021 1315  Gross per 24 hour   Intake 1971.39 ml   Output 950 ml   Net 1021.39 ml       Physical Exam Performed:  /63   Pulse 92   Temp 97.8 °F (36.6 °C) (Oral)   Resp 20   Ht 5' 6\" (1.676 m)   Wt 155 lb 13.8 oz (70.7 kg)   SpO2 96%   BMI 25.16 kg/m²     General appearance: No apparent distress, appears stated age and cooperative, drowsy. HEENT: Conjunctivae/corneas clear. Neck: No jugular venous distention. Respiratory:  Normal respiratory effort. Clear to auscultation, bilaterally without Rales/Wheezes/Rhonchi. Cardiovascular: Regular rate and rhythm with normal S1/S2 without murmurs, rubs or gallops. Abdomen: Soft, non-distended with normal bowel sounds.  Mild tenderness  Musculoskeletal: No clubbing, cyanosis or edema bilaterally. Skin: Skin color, texture, turgor normal.  No rashes or lesions. Neurologic:  Neurovascularly intact without any focal sensory/motor deficits. Psychiatric: Alert and oriented, thought content appropriate, normal insight      Labs:   Recent Labs     10/31/21  0321 11/01/21  0500 11/02/21  0806   WBC 18.5* 17.6* 17.3*   HGB 11.3* 12.1 12.3   HCT 33.2* 35.2* 35.6*   * 124* 139     Recent Labs     11/01/21  1251 11/01/21  1928 11/02/21  0806   * 127* 127*   K 3.3* 3.3* 3.3*   CL 94* 93* 93*   CO2 19* 23 24   BUN 35* 32* 27*   CREATININE 1.3* 1.2 1.2   CALCIUM 7.8* 8.0* 8.1*     Recent Labs     10/30/21  2100 10/31/21  0321 11/01/21  0500   AST 35 30 26   ALT 32 28 28   BILITOT 1.6* 1.6* 1.4*   ALKPHOS 90 86 99     No results for input(s): INR in the last 72 hours. Recent Labs     10/30/21  2100   TROPONINI <0.01       Urinalysis:    Lab Results   Component Value Date    NITRU Negative 10/30/2021    WBCUA >100 10/30/2021    BACTERIA 3+ 10/30/2021    RBCUA see below 10/30/2021    BLOODU MODERATE 10/30/2021    SPECGRAV 1.020 10/30/2021    GLUCOSEU Negative 10/30/2021       Radiology:  CT ABDOMEN PELVIS WO CONTRAST Additional Contrast? None   Final Result   1. No acute finding in the abdomen or pelvis. 2. Cholelithiasis and diverticulosis noted incidentally. 3. Trace pleural effusions in the lower chest with peripheral airspace   opacities. Correlate with any pulmonary symptoms. A developing viral   infection or pulmonary edema may be considered. XR CHEST PORTABLE   Final Result   Mild cardiomegaly and COPD. No other cardiopulmonary disease. Active Hospital Problems    Diagnosis     Sepsis ([facility] [address] [A41.9]      Assessment/Plan:  1. Acute E. coli cystitis with Bacteremia POA  - WBC has improved from 21.2 to 17.3  - CXR showed no acute process  - Blood cultures x2 grew E.  Coli , pan sensitive with resistance to bactrim   - Urine culture showed E. coli   - Covid negative - Vancomycin x1 on 10/30  - Discontinue Zosyn (3 days)   - Start 2 g of ceftriaxone     2. SHAY POA  - Etiology unknown. Likely secondary to dehydration, sepsis   - Nephrology following and assisting with management- Mild improvement with IVF   - Cr of 1.2 from 1.7 on admission     3. Electrolyte disturbance   - Hyponatremia: mild improvement. 122 on admission, 127 today  - Hypokalemia: 3.3 today will continue to monitor, likely due to diarrhea  - Hypocalcemia: resolved with medial therapy   - Hypomagnesemia: resolved with medical therapy    - Nephrology following and assisting with management    4. A. Fib with RVR   - Rate controlled post bolus of amiodarone    - Continue Eliquis for stroke prophylaxis. 5. Diarrhea  - C diff pending   - No diarrhea for past 48 hours     6. Hyperglycemia  - A1C of 5.7%   - Will continue to monitor     7. Increased Shortness of Breath   - Improved with sitting upright in the chair   - Will order incentive spirometer   - Discontinue fluids   - Will continue to monitor     DVT Prophylaxis: Eliquis   Diet: ADULT DIET; Regular  Code Status: Full Code    PT/OT Eval Status: 24 hour supervision or assist, home with home health PT/OT     Dispo -likely in 2 days pending clinical improvement     Wilfrid Miller DO     Addendum to Resident  Progress note:  Pt seen,examined and evaluated with resident and discussed regarding POC . I have reviewed the current history, physical findings, labs and assessment and plan and agree with note as documented by resident DO ( Dr. Abrahan Galeas)     Patient seen and examined with  at bedside . The patient and / or the family were informed of the results of tests, a time was given to answer questions, a plan was proposed and they agreed with plan. Noted E. coli pansensitive. Will transition to IV Rocephin 2 g daily given bacteremia and will request repeat blood cultures tomorrow. Reviewed PT OT recommendations for home PT OT.   Anticipate discharge in the next 1 to 2 days pending resolution of leukocytosis.       Aaron Kiran MD  Hospitalist Physician

## 2021-11-02 NOTE — PROGRESS NOTES
/71   Pulse 103   Temp 97.8 °F (36.6 °C) (Oral)   Resp 20   Ht 5' 6\" (1.676 m)   Wt 155 lb 13.8 oz (70.7 kg)   SpO2 93%   BMI 25.16 kg/m²  on 1L O2 per nasal cannula, O2 increased to 2L, pt with dyspnea at rest and with exertion. Pt complains of \"achiness\" generalized, prn tylenol given. Lungs diminished. Pt with occasional non productive cough. Atrial Fibrillation on tele. Writer assisted pt with turning and repositioning. New pure wick external catheter placed. BLE elevated off bed onto pillow with heels floated off pillow. Pt denies any other needs at this time. Bedside table, call light and breakfast within reach. Bed alarm in place and activated. Pt instructed to call out for any needs and assistance. Will continue to monitor.   Kaylie Casanova RN  11/2/2021

## 2021-11-03 LAB
ANION GAP SERPL CALCULATED.3IONS-SCNC: 9 MMOL/L (ref 3–16)
BANDED NEUTROPHILS RELATIVE PERCENT: 12 % (ref 0–7)
BASOPHILS ABSOLUTE: 0 K/UL (ref 0–0.2)
BASOPHILS RELATIVE PERCENT: 0 %
BUN BLDV-MCNC: 23 MG/DL (ref 7–20)
CALCIUM SERPL-MCNC: 8.4 MG/DL (ref 8.3–10.6)
CHLORIDE BLD-SCNC: 98 MMOL/L (ref 99–110)
CO2: 24 MMOL/L (ref 21–32)
CREAT SERPL-MCNC: 1 MG/DL (ref 0.6–1.2)
EOSINOPHILS ABSOLUTE: 0.4 K/UL (ref 0–0.6)
EOSINOPHILS RELATIVE PERCENT: 2 %
GFR AFRICAN AMERICAN: >60
GFR NON-AFRICAN AMERICAN: 53
GLUCOSE BLD-MCNC: 136 MG/DL (ref 70–99)
HCT VFR BLD CALC: 33.6 % (ref 36–48)
HEMATOLOGY PATH CONSULT: NORMAL
HEMOGLOBIN: 11.3 G/DL (ref 12–16)
LV EF: 38 %
LVEF MODALITY: NORMAL
LYMPHOCYTES ABSOLUTE: 0.6 K/UL (ref 1–5.1)
LYMPHOCYTES RELATIVE PERCENT: 3 %
MAGNESIUM: 2.2 MG/DL (ref 1.8–2.4)
MCH RBC QN AUTO: 29.8 PG (ref 26–34)
MCHC RBC AUTO-ENTMCNC: 33.7 G/DL (ref 31–36)
MCV RBC AUTO: 88.4 FL (ref 80–100)
MONOCYTES ABSOLUTE: 2.5 K/UL (ref 0–1.3)
MONOCYTES RELATIVE PERCENT: 13 %
NEUTROPHILS ABSOLUTE: 15.6 K/UL (ref 1.7–7.7)
NEUTROPHILS RELATIVE PERCENT: 70 %
PDW BLD-RTO: 14.1 % (ref 12.4–15.4)
PLATELET # BLD: 168 K/UL (ref 135–450)
PMV BLD AUTO: 8.5 FL (ref 5–10.5)
POTASSIUM REFLEX MAGNESIUM: 3.4 MMOL/L (ref 3.5–5.1)
PRO-BNP: 8372 PG/ML (ref 0–449)
RBC # BLD: 3.8 M/UL (ref 4–5.2)
RBC # BLD: NORMAL 10*6/UL
SODIUM BLD-SCNC: 131 MMOL/L (ref 136–145)
WBC # BLD: 19 K/UL (ref 4–11)

## 2021-11-03 PROCEDURE — 94761 N-INVAS EAR/PLS OXIMETRY MLT: CPT

## 2021-11-03 PROCEDURE — 6360000002 HC RX W HCPCS: Performed by: INTERNAL MEDICINE

## 2021-11-03 PROCEDURE — 87324 CLOSTRIDIUM AG IA: CPT

## 2021-11-03 PROCEDURE — 83880 ASSAY OF NATRIURETIC PEPTIDE: CPT

## 2021-11-03 PROCEDURE — 6370000000 HC RX 637 (ALT 250 FOR IP): Performed by: INTERNAL MEDICINE

## 2021-11-03 PROCEDURE — 2580000003 HC RX 258: Performed by: INTERNAL MEDICINE

## 2021-11-03 PROCEDURE — 99232 SBSQ HOSP IP/OBS MODERATE 35: CPT | Performed by: HOSPITALIST

## 2021-11-03 PROCEDURE — 83735 ASSAY OF MAGNESIUM: CPT

## 2021-11-03 PROCEDURE — 6370000000 HC RX 637 (ALT 250 FOR IP): Performed by: HOSPITALIST

## 2021-11-03 PROCEDURE — 87040 BLOOD CULTURE FOR BACTERIA: CPT

## 2021-11-03 PROCEDURE — 85025 COMPLETE CBC W/AUTO DIFF WBC: CPT

## 2021-11-03 PROCEDURE — 93306 TTE W/DOPPLER COMPLETE: CPT

## 2021-11-03 PROCEDURE — 2060000000 HC ICU INTERMEDIATE R&B

## 2021-11-03 PROCEDURE — 2700000000 HC OXYGEN THERAPY PER DAY

## 2021-11-03 PROCEDURE — 36415 COLL VENOUS BLD VENIPUNCTURE: CPT

## 2021-11-03 PROCEDURE — 87449 NOS EACH ORGANISM AG IA: CPT

## 2021-11-03 PROCEDURE — 80048 BASIC METABOLIC PNL TOTAL CA: CPT

## 2021-11-03 RX ORDER — POTASSIUM CHLORIDE 20 MEQ/1
20 TABLET, EXTENDED RELEASE ORAL ONCE
Status: COMPLETED | OUTPATIENT
Start: 2021-11-03 | End: 2021-11-03

## 2021-11-03 RX ORDER — FUROSEMIDE 40 MG/1
40 TABLET ORAL ONCE
Status: COMPLETED | OUTPATIENT
Start: 2021-11-03 | End: 2021-11-03

## 2021-11-03 RX ADMIN — FUROSEMIDE 40 MG: 40 TABLET ORAL at 11:46

## 2021-11-03 RX ADMIN — Medication 1 G: at 16:52

## 2021-11-03 RX ADMIN — LEVOTHYROXINE SODIUM 100 MCG: 0.1 TABLET ORAL at 06:47

## 2021-11-03 RX ADMIN — CARVEDILOL 6.25 MG: 6.25 TABLET, FILM COATED ORAL at 21:13

## 2021-11-03 RX ADMIN — Medication 1 G: at 08:53

## 2021-11-03 RX ADMIN — ACETAMINOPHEN 650 MG: 325 TABLET ORAL at 08:56

## 2021-11-03 RX ADMIN — CARVEDILOL 6.25 MG: 6.25 TABLET, FILM COATED ORAL at 08:53

## 2021-11-03 RX ADMIN — APIXABAN 2.5 MG: 2.5 TABLET, FILM COATED ORAL at 08:53

## 2021-11-03 RX ADMIN — CEFTRIAXONE SODIUM 2000 MG: 2 INJECTION, POWDER, FOR SOLUTION INTRAMUSCULAR; INTRAVENOUS at 11:51

## 2021-11-03 RX ADMIN — SODIUM CHLORIDE, PRESERVATIVE FREE 10 ML: 5 INJECTION INTRAVENOUS at 08:53

## 2021-11-03 RX ADMIN — POTASSIUM CHLORIDE 20 MEQ: 20 TABLET, EXTENDED RELEASE ORAL at 16:51

## 2021-11-03 RX ADMIN — APIXABAN 2.5 MG: 2.5 TABLET, FILM COATED ORAL at 21:13

## 2021-11-03 RX ADMIN — ACETAMINOPHEN 650 MG: 325 TABLET ORAL at 21:13

## 2021-11-03 ASSESSMENT — PAIN SCALES - GENERAL
PAINLEVEL_OUTOF10: 4
PAINLEVEL_OUTOF10: 7

## 2021-11-03 NOTE — CARE COORDINATION
Patient being treated for C-DIFF. Blood cultures positive. E.Coli pansensitive. Getting IV Rocephin 2 grams daily for bacteremia and repeating blood cultures today. Will go home in next day or two likely with home care once leukocytosis resolved. Discussed home care with patient. . She stated she would be agreeable but her  would not want anyone coming in their house because of covid. Patient would not agree to a home care referral and asked writer to talk to her  about it. Call placed to spouse. He was agreeable to setting up home care. He states they have never used home care and have preference in agency. Offered home care list. He stated they had no preference as long as his insurance covered it. Referral initiated with [facility].

## 2021-11-03 NOTE — PROGRESS NOTES
Nephrology Progress  Note                                                                                                                                                                                                                                                                                                                                                               Office : [phone]     Fax :[phone]              Patient's Name: Saray Multani  12:32 PM  11/3/2021    Reason for Consult:  Hyponatremia,  Requesting Physician:  Clara Sandy MD      Chief Complaint:  N, V, diarrhea      Interval History :     Serum Na 125 ---> 127 ----> 131  Bicarb improved to 24  No diarrhea  Sitting in chair , not in acute distress  No vomiting        History of Present Ilness:    Saray Multani is a 79 y.o. female with PMH of HTN , hypothyroidism ,Afib     Presented to ED with c/o nausea  Vomiting and diarrhea   Onset last 4-5 days  C/o weakness    Nephrology consult for hyponatremia management      Past Medical History:   Diagnosis Date    Hypertension     Thyroid disease     hypothyroidism       Past Surgical History:   Procedure Laterality Date    HEMORRHOID SURGERY      HYSTERECTOMY      KNEE ARTHROSCOPY Right 3/10/2016    TONSILLECTOMY         History reviewed. No pertinent family history. reports that she quit smoking about 41 years ago. She has never used smokeless tobacco. She reports current alcohol use of about 12.0 - 14.0 standard drinks of alcohol per week. She reports that she does not use drugs.     Allergies:  Shellfish-derived products    Current Medications:    perflutren lipid microspheres (DEFINITY) injection 1.65 mg, ONCE PRN  cefTRIAXone (ROCEPHIN) 2000 mg IVPB in D5W 50ml minibag, Q24H  sodium chloride tablet 1 g, BID WC  calcium carbonate (TUMS) chewable tablet 500 mg, TID PRN  sodium chloride flush 0.9 % injection 10 mL, 2 times per day  sodium chloride flush 0.9 % injection 10 mL, PRN  0.9 % sodium chloride infusion, PRN  magnesium sulfate 2000 mg in 50 mL IVPB premix, PRN  acetaminophen (TYLENOL) tablet 650 mg, Q6H PRN   Or  acetaminophen (TYLENOL) suppository 650 mg, Q6H PRN  glucose (GLUTOSE) 40 % oral gel 15 g, PRN  dextrose 50 % IV solution, PRN  glucagon (rDNA) injection 1 mg, PRN  dextrose 5 % solution, PRN  carvedilol (COREG) tablet 6.25 mg, BID  levothyroxine (SYNTHROID) tablet 100 mcg, Daily  apixaban (ELIQUIS) tablet 2.5 mg, BID  sodium chloride flush 0.9 % injection 5-40 mL, 2 times per day  sodium chloride flush 0.9 % injection 5-40 mL, PRN  0.9 % sodium chloride infusion, PRN  ondansetron (ZOFRAN) injection 4 mg, Q1H PRN        Review of Systems:   14 point ROS obtained but were negative except mentioned in HPI      Physical exam:     Vitals:  /73   Pulse 103   Temp 97.9 °F (36.6 °C)   Resp 16   Ht 5' 6\" (1.676 m)   Wt 161 lb 2.5 oz (73.1 kg)   SpO2 91%   BMI 26.01 kg/m²   Constitutional:  OAA X3 NAD  Skin: no rash, turgor wnl  Heent:  eomi, mmm  Neck: no bruits or jvd noted  Cardiovascular:  S1, S2 without m/r/g  Respiratory: CTA B without w/r/r  Abdomen:  +bs, soft, nt, nd  Ext: no  lower extremity edema  Psychiatric: mood and affect appropriate  Musculoskeletal:  Rom, muscular strength intact    Data:   Labs:  CBC:   Recent Labs     11/01/21  0500 11/02/21  0806 11/03/21  0517   WBC 17.6* 17.3* 19.0*   HGB 12.1 12.3 11.3*   * 139 168     BMP:    Recent Labs     11/01/21  1928 11/02/21  0806 11/03/21  0517   * 127* 131*   K 3.3* 3.3* 3.4*   CL 93* 93* 98*   CO2 23 24 24   BUN 32* 27* 23*   CREATININE 1.2 1.2 1.0   GLUCOSE 146* 111* 136*     Ca/Mg/Phos:   Recent Labs     11/01/21  0500 11/01/21  1251 11/01/21  1928 11/02/21  0806 11/03/21  0517   CALCIUM 8.3   < > 8.0* 8.1* 8.4   MG 2.50*  --   --  2.20 2.20    < > = values in this interval not displayed.      Hepatic:   Recent Labs     11/01/21  0500   AST 26   ALT 28   BILITOT 1.4*   ALKPHOS 99 Troponin:   No results for input(s): TROPONINI in the last 72 hours. BNP: No results for input(s): BNP in the last 72 hours. Lipids: No results for input(s): CHOL, TRIG, HDL, LDLCALC, LABVLDL in the last 72 hours. ABGs: No results for input(s): PHART, PO2ART, BCY0BPB in the last 72 hours. INR: No results for input(s): INR in the last 72 hours. UA:  No results for input(s): Roxanna Forte, GLUCOSEU, BILIRUBINUR, KETUA, SPECGRAV, BLOODU, PHUR, PROTEINU, UROBILINOGEN, NITRU, LEUKOCYTESUR, Liddie Cruise in the last 72 hours. Urine Microscopic:   No results for input(s): LABCAST, BACTERIA, COMU, HYALCAST, WBCUA, RBCUA, EPIU in the last 72 hours. Urine Culture:   No results for input(s): LABURIN in the last 72 hours. Urine Chemistry:   Recent Labs     10/31/21  1807 11/01/21  1645   LABCREA 75.1 63.7   NAUR <20 <20             IMAGING:  CT ABDOMEN PELVIS WO CONTRAST Additional Contrast? None   Final Result   1. No acute finding in the abdomen or pelvis. 2. Cholelithiasis and diverticulosis noted incidentally. 3. Trace pleural effusions in the lower chest with peripheral airspace   opacities. Correlate with any pulmonary symptoms. A developing viral   infection or pulmonary edema may be considered. XR CHEST PORTABLE   Final Result   Mild cardiomegaly and COPD. No other cardiopulmonary disease. Assessment/Plan   1. Hyponatremia      Serum Na 122  -----> 126    Etiology could be hypovolemic hyponatremia due to vomiting , diarrhea   Poor solute intake VS SIADH    Volume status : hypovolemic side    Urine Na < 20     Plan    Serum Na improving    Will give salt tab 1 gram BID    Encourage high protein/solute diet    Moniter serum Na every 12 h      2. NAGMA 2/2 diarrhea    improved    3.  CKD stage 3    [facility]    Serum cr stable    Avoid NSAIDs      4 N/V/D    improved      5 Hypokalemia    Replace with KCL prn                  Thank you for allowing us to participate in care of Ca Caputo MD  Feel free to contact me   [facility] [address]  Office : [phone]  Fax :[phone]

## 2021-11-03 NOTE — PROGRESS NOTES
Hospitalist Progress Note      PCP: Sal Guillen MD    Date of Admission: 10/30/2021    Chief Complaint: Nausea, fatigue and diarrhea     Subjective:   Patient states that she is not feeling well this morning. She states that she is having lots of muscle aches. She states that she is still short of breath but it is improving. She states that she feels very fatigued. She denies diarrhea today. Medications:  Reviewed    Infusion Medications    sodium chloride 25 mL (11/02/21 1244)    dextrose      sodium chloride       Scheduled Medications    cefTRIAXone (ROCEPHIN) IV  2,000 mg IntraVENous Q24H    sodium chloride  1 g Oral BID WC    sodium chloride flush  10 mL IntraVENous 2 times per day    carvedilol  6.25 mg Oral BID    levothyroxine  100 mcg Oral Daily    apixaban  2.5 mg Oral BID    sodium chloride flush  5-40 mL IntraVENous 2 times per day     PRN Meds: calcium carbonate, sodium chloride flush, sodium chloride, magnesium sulfate, acetaminophen **OR** acetaminophen, glucose, dextrose, glucagon (rDNA), dextrose, sodium chloride flush, sodium chloride, ondansetron      Intake/Output Summary (Last 24 hours) at 11/3/2021 0817  Last data filed at 11/3/2021 0620  Gross per 24 hour   Intake 1835 ml   Output 1200 ml   Net 635 ml       Physical Exam Performed:  /81   Pulse 102   Temp 97.5 °F (36.4 °C)   Resp 18   Ht 5' 6\" (1.676 m)   Wt 161 lb 2.5 oz (73.1 kg)   SpO2 96%   BMI 26.01 kg/m²     General appearance: No apparent distress, appears stated age and cooperative,   HEENT: Conjunctivae/corneas clear. Neck: No jugular venous distention. Respiratory:  Normal respiratory effort. Clear to auscultation, bilaterally without Rales/Wheezes/Rhonchi. Cardiovascular: Regular rate and rhythm with normal S1/S2 without murmurs, rubs or gallops. Abdomen: Soft, non-distended, non-tender  Musculoskeletal: No clubbing, cyanosis or edema bilaterally.    Skin: Skin color, texture, turgor normal.  No rashes or lesions. Neurologic:  Neurovascularly intact without any focal sensory/motor deficits. Psychiatric: Alert and oriented, thought content appropriate, normal insight      Labs:   Recent Labs     11/01/21  0500 11/02/21  0806 11/03/21  0517   WBC 17.6* 17.3* 19.0*   HGB 12.1 12.3 11.3*   HCT 35.2* 35.6* 33.6*   * 139 168     Recent Labs     11/01/21  1928 11/02/21  0806 11/03/21  0517   * 127* 131*   K 3.3* 3.3* 3.4*   CL 93* 93* 98*   CO2 23 24 24   BUN 32* 27* 23*   CREATININE 1.2 1.2 1.0   CALCIUM 8.0* 8.1* 8.4     Recent Labs     11/01/21  0500   AST 26   ALT 28   BILITOT 1.4*   ALKPHOS 99     No results for input(s): INR in the last 72 hours. No results for input(s): [facility] in the last 72 hours. Urinalysis:    Lab Results   Component Value Date    NITRU Negative 10/30/2021    WBCUA >100 10/30/2021    BACTERIA 3+ 10/30/2021    RBCUA see below 10/30/2021    BLOODU MODERATE 10/30/2021    SPECGRAV 1.020 10/30/2021    GLUCOSEU Negative 10/30/2021       Radiology:  CT ABDOMEN PELVIS WO CONTRAST Additional Contrast? None   Final Result   1. No acute finding in the abdomen or pelvis. 2. Cholelithiasis and diverticulosis noted incidentally. 3. Trace pleural effusions in the lower chest with peripheral airspace   opacities. Correlate with any pulmonary symptoms. A developing viral   infection or pulmonary edema may be considered. XR CHEST PORTABLE   Final Result   Mild cardiomegaly and COPD. No other cardiopulmonary disease. Active Hospital Problems    Diagnosis     Sepsis ([facility] [address] [A41.9]      Assessment/Plan:  1. Acute E. coli cystitis with Bacteremia POA  - WBC has increased to 19.0  - CXR showed no acute process  - Blood cultures x2 grew E.  Coli , pan sensitive with resistance to bactrim   - Urine culture showed E. coli   - Covid negative   - Vancomycin x1 on 10/30  - Discontinue Zosyn (3 days)   - Continue 2 g of ceftriaxone   - Repeat blood cultures ordered     2. SHAY POA  - Etiology unknown. Likely secondary to dehydration, sepsis   - Nephrology following and assisting with management- Mild improvement with IVF   - Cr of 1.0 from 1.7 on admission     3. Electrolyte disturbance   - Hyponatremia: improved to 131  - Hypokalemia: 3.4 today will continue to monitor, improvement after 20 meq of Klor-Con   - Hypocalcemia: resolved with medial therapy   - Hypomagnesemia: resolved with medical therapy    - Nephrology following and assisting with management    4. A. Fib with RVR   - Rate controlled post bolus of amiodarone on 10/31  - Continue Eliquis for stroke prophylaxis. 5. Diarrhea  - C diff pending   - No diarrhea for past 72 hours     6. Hyperglycemia  - A1C of 5.7%   - Will continue to monitor     7. Increased Shortness of Breath   - Improved with sitting upright in the chair   - Improved with incentive spirometer   - Discontinue fluids   - Patient is net positive of 2,196.4 since admission   - 1 dose of PO Lasix 40 mg   - Will order an Echo   - BNP increase to 8372 from 7318 on admission   - Will continue to monitor     DVT Prophylaxis: Eliquis   Diet: ADULT DIET; Regular  Code Status: Full Code    PT/OT Eval Status: 24 hour supervision or assist, home with home health PT/OT     Dispo -likely in 1-2 days pending clinical improvement     Cintia Swain DO     Addendum to Resident  Progress note:  Pt seen,examined and evaluated with resident and discussed regarding POC . I have reviewed the current history, physical findings, labs and assessment and plan and agree with note as documented by resident DO ( Dr. Burnette)     Patient seen and examined this AM and spoke to her daughter to provide clinical update .   The patient and / or the family were informed of the results of tests, a time was given to answer questions, a plan was proposed and they agreed with plan.   Leukocytosis persisting; continue IV Rocephin 1 g   Daily .   Patient reports chest tightness with feeling short of breath. Requested proBNP which is elevated ordered 40 mg of Lasix x1; strict I's/O monitoring. Also requested for an updated echo. Will follow. Continue rest of the current care. repeat blood cultures requested on 11/3/21. Reviewed PT OT recommendations for home PT OT. Anticipate discharge in the next 1 to 2 days pending resolution of leukocytosis.       Iveth Perez MD  Hospitalist Physician

## 2021-11-03 NOTE — CARE COORDINATION
[facility]    Referral received from  to follow for home care services. I will follow for needs, and speak with patient to verify demos.     Alysia Yi RN, BSN CTN  [facility] [phone]

## 2021-11-03 NOTE — PROGRESS NOTES
Physical Therapy  Pt resting in bed and declined participating in PT activity stating she wasn't feeling well. Pt's  at bedside putting a cool washcloth on pt's forehead. PT assisted pt to scoot to [facility] for comfort. Will follow up to continue PT POC as pt is able and willing to participate.      Josephine Ramos, PT, DPT

## 2021-11-04 LAB
ANION GAP SERPL CALCULATED.3IONS-SCNC: 10 MMOL/L (ref 3–16)
ANISOCYTOSIS: ABNORMAL
ATYPICAL LYMPHOCYTE RELATIVE PERCENT: 1 % (ref 0–6)
BANDED NEUTROPHILS RELATIVE PERCENT: 23 % (ref 0–7)
BASOPHILS ABSOLUTE: 0 K/UL (ref 0–0.2)
BASOPHILS RELATIVE PERCENT: 0 %
BUN BLDV-MCNC: 22 MG/DL (ref 7–20)
CALCIUM SERPL-MCNC: 8.9 MG/DL (ref 8.3–10.6)
CHLORIDE BLD-SCNC: 101 MMOL/L (ref 99–110)
CO2: 26 MMOL/L (ref 21–32)
CREAT SERPL-MCNC: 0.9 MG/DL (ref 0.6–1.2)
DOHLE BODIES: PRESENT
EOSINOPHILS ABSOLUTE: 0.4 K/UL (ref 0–0.6)
EOSINOPHILS RELATIVE PERCENT: 2 %
GFR AFRICAN AMERICAN: >60
GFR NON-AFRICAN AMERICAN: >60
GLUCOSE BLD-MCNC: 117 MG/DL (ref 70–99)
HCT VFR BLD CALC: 36.7 % (ref 36–48)
HEMOGLOBIN: 12.3 G/DL (ref 12–16)
LYMPHOCYTES ABSOLUTE: 3 K/UL (ref 1–5.1)
LYMPHOCYTES RELATIVE PERCENT: 15 %
MCH RBC QN AUTO: 29.6 PG (ref 26–34)
MCHC RBC AUTO-ENTMCNC: 33.5 G/DL (ref 31–36)
MCV RBC AUTO: 88.5 FL (ref 80–100)
METAMYELOCYTES RELATIVE PERCENT: 1 %
MONOCYTES ABSOLUTE: 0.8 K/UL (ref 0–1.3)
MONOCYTES RELATIVE PERCENT: 4 %
NEUTROPHILS ABSOLUTE: 14.7 K/UL (ref 1.7–7.7)
NEUTROPHILS RELATIVE PERCENT: 54 %
PDW BLD-RTO: 14.4 % (ref 12.4–15.4)
PLATELET # BLD: 235 K/UL (ref 135–450)
PLATELET SLIDE REVIEW: ADEQUATE
PMV BLD AUTO: 8.1 FL (ref 5–10.5)
POTASSIUM REFLEX MAGNESIUM: 3.7 MMOL/L (ref 3.5–5.1)
RBC # BLD: 4.15 M/UL (ref 4–5.2)
SLIDE REVIEW: ABNORMAL
SODIUM BLD-SCNC: 137 MMOL/L (ref 136–145)
WBC # BLD: 18.8 K/UL (ref 4–11)

## 2021-11-04 PROCEDURE — 2580000003 HC RX 258: Performed by: EMERGENCY MEDICINE

## 2021-11-04 PROCEDURE — 6370000000 HC RX 637 (ALT 250 FOR IP): Performed by: INTERNAL MEDICINE

## 2021-11-04 PROCEDURE — 99232 SBSQ HOSP IP/OBS MODERATE 35: CPT | Performed by: HOSPITALIST

## 2021-11-04 PROCEDURE — 6360000002 HC RX W HCPCS

## 2021-11-04 PROCEDURE — 6360000002 HC RX W HCPCS: Performed by: EMERGENCY MEDICINE

## 2021-11-04 PROCEDURE — 36415 COLL VENOUS BLD VENIPUNCTURE: CPT

## 2021-11-04 PROCEDURE — 2700000000 HC OXYGEN THERAPY PER DAY

## 2021-11-04 PROCEDURE — 97530 THERAPEUTIC ACTIVITIES: CPT

## 2021-11-04 PROCEDURE — 80048 BASIC METABOLIC PNL TOTAL CA: CPT

## 2021-11-04 PROCEDURE — 97535 SELF CARE MNGMENT TRAINING: CPT

## 2021-11-04 PROCEDURE — 97116 GAIT TRAINING THERAPY: CPT

## 2021-11-04 PROCEDURE — 2580000003 HC RX 258: Performed by: INTERNAL MEDICINE

## 2021-11-04 PROCEDURE — 6360000002 HC RX W HCPCS: Performed by: INTERNAL MEDICINE

## 2021-11-04 PROCEDURE — 2060000000 HC ICU INTERMEDIATE R&B

## 2021-11-04 PROCEDURE — 94761 N-INVAS EAR/PLS OXIMETRY MLT: CPT

## 2021-11-04 PROCEDURE — 97110 THERAPEUTIC EXERCISES: CPT

## 2021-11-04 PROCEDURE — 99223 1ST HOSP IP/OBS HIGH 75: CPT | Performed by: INTERNAL MEDICINE

## 2021-11-04 PROCEDURE — 85025 COMPLETE CBC W/AUTO DIFF WBC: CPT

## 2021-11-04 PROCEDURE — 6370000000 HC RX 637 (ALT 250 FOR IP): Performed by: HOSPITALIST

## 2021-11-04 RX ORDER — FUROSEMIDE 10 MG/ML
20 INJECTION INTRAMUSCULAR; INTRAVENOUS 2 TIMES DAILY
Status: DISCONTINUED | OUTPATIENT
Start: 2021-11-04 | End: 2021-11-05

## 2021-11-04 RX ORDER — FUROSEMIDE 10 MG/ML
40 INJECTION INTRAMUSCULAR; INTRAVENOUS 2 TIMES DAILY
Status: DISCONTINUED | OUTPATIENT
Start: 2021-11-04 | End: 2021-11-04

## 2021-11-04 RX ORDER — FUROSEMIDE 10 MG/ML
20 INJECTION INTRAMUSCULAR; INTRAVENOUS 2 TIMES DAILY
Status: DISCONTINUED | OUTPATIENT
Start: 2021-11-04 | End: 2021-11-04

## 2021-11-04 RX ORDER — METOPROLOL SUCCINATE 50 MG/1
50 TABLET, EXTENDED RELEASE ORAL DAILY
Status: DISCONTINUED | OUTPATIENT
Start: 2021-11-04 | End: 2021-11-05

## 2021-11-04 RX ORDER — LISINOPRIL 5 MG/1
5 TABLET ORAL DAILY
Status: DISCONTINUED | OUTPATIENT
Start: 2021-11-05 | End: 2021-11-08

## 2021-11-04 RX ADMIN — CEFTRIAXONE SODIUM 2000 MG: 2 INJECTION, POWDER, FOR SOLUTION INTRAMUSCULAR; INTRAVENOUS at 13:11

## 2021-11-04 RX ADMIN — ACETAMINOPHEN 650 MG: 325 TABLET ORAL at 13:04

## 2021-11-04 RX ADMIN — APIXABAN 2.5 MG: 2.5 TABLET, FILM COATED ORAL at 09:30

## 2021-11-04 RX ADMIN — ONDANSETRON 4 MG: 2 INJECTION INTRAMUSCULAR; INTRAVENOUS at 13:09

## 2021-11-04 RX ADMIN — LEVOTHYROXINE SODIUM 100 MCG: 0.1 TABLET ORAL at 05:53

## 2021-11-04 RX ADMIN — Medication 1 G: at 09:30

## 2021-11-04 RX ADMIN — Medication 10 ML: at 13:09

## 2021-11-04 RX ADMIN — ACETAMINOPHEN 650 MG: 325 TABLET ORAL at 05:53

## 2021-11-04 RX ADMIN — CARVEDILOL 6.25 MG: 6.25 TABLET, FILM COATED ORAL at 09:30

## 2021-11-04 RX ADMIN — SODIUM CHLORIDE, PRESERVATIVE FREE 10 ML: 5 INJECTION INTRAVENOUS at 22:16

## 2021-11-04 RX ADMIN — SODIUM CHLORIDE, PRESERVATIVE FREE 10 ML: 5 INJECTION INTRAVENOUS at 12:18

## 2021-11-04 RX ADMIN — Medication 10 ML: at 18:22

## 2021-11-04 RX ADMIN — SODIUM CHLORIDE, PRESERVATIVE FREE 10 ML: 5 INJECTION INTRAVENOUS at 09:30

## 2021-11-04 RX ADMIN — FUROSEMIDE 20 MG: 10 INJECTION, SOLUTION INTRAMUSCULAR; INTRAVENOUS at 12:19

## 2021-11-04 ASSESSMENT — ENCOUNTER SYMPTOMS
EYE PAIN: 0
COUGH: 0
VOMITING: 1
COUGH: 0
SHORTNESS OF BREATH: 1
EYE PAIN: 0
ABDOMINAL PAIN: 0
VOMITING: 0
PHOTOPHOBIA: 0
BLOOD IN STOOL: 0
BLOOD IN STOOL: 0
ABDOMINAL PAIN: 0
PHOTOPHOBIA: 0
DIARRHEA: 0
NAUSEA: 1
CONSTIPATION: 0
NAUSEA: 0
RHINORRHEA: 0
SORE THROAT: 0
RHINORRHEA: 0
SHORTNESS OF BREATH: 1
DIARRHEA: 1
CONSTIPATION: 0
SORE THROAT: 0

## 2021-11-04 ASSESSMENT — PAIN DESCRIPTION - ONSET: ONSET: ON-GOING

## 2021-11-04 ASSESSMENT — PAIN SCALES - GENERAL
PAINLEVEL_OUTOF10: 10
PAINLEVEL_OUTOF10: 8
PAINLEVEL_OUTOF10: 0
PAINLEVEL_OUTOF10: 0
PAINLEVEL_OUTOF10: 5

## 2021-11-04 ASSESSMENT — PAIN DESCRIPTION - PAIN TYPE
TYPE: ACUTE PAIN

## 2021-11-04 ASSESSMENT — PAIN DESCRIPTION - LOCATION
LOCATION: GENERALIZED

## 2021-11-04 ASSESSMENT — PAIN DESCRIPTION - DESCRIPTORS: DESCRIPTORS: PATIENT UNABLE TO DESCRIBE

## 2021-11-04 ASSESSMENT — PAIN DESCRIPTION - FREQUENCY: FREQUENCY: CONTINUOUS

## 2021-11-04 NOTE — CONSULTS
[facility]  [phone]      Attending Physician: Adolfo Kwan MD  Reason for Consultation/Chief Complaint: Atrial fibrillation, acute heart failure    Subjective   History of Present Illness:  Alexa Leyden is a 79 y.o. female with a history of persistent atrial fibrillation, non-ischemic cardiomyopathy, mitral regurgitation, and tobacco use initially admitted with a UTI and sepsis. Urine and blood cultures grew E. Coli. The patient was recently referred to me as an outpatient for further evaluation of recently diagnosed atrial fibrillation. At that time, her ventricular rate was controlled and there were plans to perform an outpatient ROBERTO/DCCV once she had had an adequate period of anticoagulation with Eliquis. She now reports that in the several days leading up to her admission, she had become progressively weak and short of breath and developed nausea, vomiting, and diarrhea. She presented to the ED on 10/30/21 and was found to be septic from a UTI with both urine and blood cultures growing E. Coli. She has been receiving IV ceftriaxone. She has been intermittently tachycardic and was given a bolus of IV amiodarone 150 mg on 10/31. She remains in atrial fibrillation with ventricular rate currently in the 110-120s. A TTE obtained yesterday showed an LVEF of 35-40%, reduced RV systolic function, severe left atrial dilatation, mild-moderate mitral regurgitation, mild pulmonic regurgitation, moderate tricuspid regurgitation, and mild pulmonary hypertension with SPAP estimated at 44 mmHg. Of note, the patient was diagnosed with a mildly reduced LVEF of 40-45% on a TTE from 2005. She underwent invasive coronary angiography at that time which demonstrated no angiographically significant coronary artery disease. Her LVEF had recovered to 60-65% on a TTE from 2014 and was estimated between 52-57% on her last TTE prior to this admission on 6/23/20.          Past Medical History:   has a past medical history of Hypertension and Thyroid disease. Surgical History:   has a past surgical history that includes Hemorrhoid surgery; Hysterectomy; Tonsillectomy; and Knee arthroscopy (Right, 3/10/2016). Social History:   reports that she quit smoking about 41 years ago. She has never used smokeless tobacco. She reports current alcohol use of about 12.0 - 14.0 standard drinks of alcohol per week. She reports that she does not use drugs. Family History:  No family history of early onset CAD or sudden cardiac death. Home Medications:  Were reviewed and are listed in nursing record and/or below  Prior to Admission medications    Medication Sig Start Date End Date Taking?  Authorizing Provider   losartan (COZAAR) 50 MG tablet Take 50 mg by mouth daily   Yes Historical Provider, MD   budesonide (ENTOCORT EC) 3 MG extended release capsule Take 3 mg by mouth every morning    Yes Historical Provider, MD   levothyroxine (SYNTHROID) 112 MCG tablet Take 112 mcg by mouth Daily   Yes Historical Provider, MD   escitalopram (LEXAPRO) 10 MG tablet Take 10 mg by mouth daily   Yes Historical Provider, MD   carvedilol (COREG) 6.25 MG tablet Take 1 tablet by mouth 2 times daily 10/27/21  Yes Oli Garcia DO   apixaban (ELIQUIS) 5 MG TABS tablet Take 1 tablet by mouth 2 times daily 10/27/21  Yes Oli Garcia DO        CURRENT Medications:  [START ON 11/5/2021] lisinopril (PRINIVIL;ZESTRIL) tablet 5 mg, Daily  metoprolol succinate (TOPROL XL) extended release tablet 50 mg, Daily  furosemide (LASIX) injection 40 mg, BID  perflutren lipid microspheres (DEFINITY) injection 1.65 mg, ONCE PRN  cefTRIAXone (ROCEPHIN) 2000 mg IVPB in D5W 50ml minibag, Q24H  calcium carbonate (TUMS) chewable tablet 500 mg, TID PRN  sodium chloride flush 0.9 % injection 10 mL, 2 times per day  sodium chloride flush 0.9 % injection 10 mL, PRN  0.9 % sodium chloride infusion, PRN  magnesium sulfate 2000 mg in 50 mL IVPB premix, PRN  acetaminophen (TYLENOL) tablet 650 mg, Q6H PRN   Or  acetaminophen (TYLENOL) suppository 650 mg, Q6H PRN  glucose (GLUTOSE) 40 % oral gel 15 g, PRN  dextrose 50 % IV solution, PRN  glucagon (rDNA) injection 1 mg, PRN  dextrose 5 % solution, PRN  levothyroxine (SYNTHROID) tablet 100 mcg, Daily  sodium chloride flush 0.9 % injection 5-40 mL, 2 times per day  sodium chloride flush 0.9 % injection 5-40 mL, PRN  0.9 % sodium chloride infusion, PRN  ondansetron (ZOFRAN) injection 4 mg, Q1H PRN        Allergies:  Shellfish-derived products     Review of Systems:   Review of Systems   Constitutional: Positive for fatigue. Negative for chills and fever. HENT: Negative for congestion, rhinorrhea and sore throat. Eyes: Negative for photophobia, pain and visual disturbance. Respiratory: Positive for shortness of breath. Negative for cough. Cardiovascular: Negative for chest pain, palpitations and leg swelling. Positive for orthopnea. Gastrointestinal: Positive for diarrhea, nausea and vomiting. Negative for abdominal pain, blood in stool and constipation. Endocrine: Negative for cold intolerance and heat intolerance. Genitourinary: Positive for frequency and urgency. Negative for difficulty urinating and hematuria. Musculoskeletal: Negative for arthralgias, joint swelling and myalgias. Skin: Negative for rash and wound. Allergic/Immunologic: Negative for environmental allergies and food allergies. Neurological: Positive for weakness. Negative for dizziness, syncope and light-headedness. Hematological: Negative for adenopathy. Does not bruise/bleed easily. Psychiatric/Behavioral: Negative for dysphoric mood. The patient is not nervous/anxious.         Objective   PHYSICAL EXAM:    Vitals:    11/04/21 1216   BP: 127/74   Pulse: 113   Resp: 22   Temp: 97.9 °F (36.6 °C)   SpO2: (!) 88%    Weight: 165 lb 2 oz (74.9 kg)       General: Elderly female sitting up in chair in no acute distress. Pleasant and interactive on exam.  HEENT: Normocephalic, atraumatic, non-icteric, hearing intact, nares normal, mucous membranes moist.  Neck: Supple, trachea midline. No adenopathy. No thyromegaly. +Hepatojugular reflux. Heart: Irregularly irregular rate and rhythm. Normal S1 and S2. Grade II/VI holosystolic murmur radiating to apex. Lungs: Normal respiratory effort. Mild crackles present bilaterally. No rales or rhonchi. Abdomen: Soft, non-tender. Normoactive bowel sounds. No masses or organomegaly. Skin: No rashes, wounds, or lesions. Pulses: 2+ and symmetric. Extremities: Trace bilateral ankle edema. No clubbing or cyanosis. Musculoskeletal: Spontaneously moves all four extremities. Psych: Normal mood and affect. Neuro: Alert and oriented to person, place, and time. No focal deficits noted. Labs   CBC:   Lab Results   Component Value Date    WBC 18.8 11/04/2021    RBC 4.15 11/04/2021    HGB 12.3 11/04/2021    HCT 36.7 11/04/2021    MCV 88.5 11/04/2021    RDW 14.4 11/04/2021     11/04/2021     CMP:  Lab Results   Component Value Date     11/04/2021    K 3.7 11/04/2021     11/04/2021    CO2 26 11/04/2021    BUN 22 11/04/2021    CREATININE 0.9 11/04/2021    GFRAA >60 11/04/2021    GFRAA >60 02/25/2010    AGRATIO 0.8 11/01/2021    LABGLOM >60 11/04/2021    GLUCOSE 117 11/04/2021    PROT 5.8 11/01/2021    PROT 7.6 02/25/2010    CALCIUM 8.9 11/04/2021    BILITOT 1.4 11/01/2021    ALKPHOS 99 11/01/2021    AST 26 11/01/2021    ALT 28 11/01/2021     PT/INR:  No results found for: PTINR  HgBA1c:  Lab Results   Component Value Date    LABA1C 5.7 10/31/2021     Lab Results   Component Value Date    TROPONINI <0.01 10/30/2021         Cardiac Data     Last EKG: Atrial fibrillation with RVR and premature or aberrantly conduced complexes, non-specific ST abnormality. Echo:  TTE 3/2/05:  RESULTS:    1. Technically adequate study.      2.  Left ventricular function is abnormal. 35%.   Regional wall motion abnormalities cannot be ruled out due to irregular   heart rate. Diastolic dysfunction grade and filing pressure are indeterminate. The left atrium is severely dilated. Right ventricular systolic function is reduced . Aortic valve appears sclerotic but opens adequately. Mitral annular calcification is present, more prominent on the posterior   mitral valve leaflet. Mild to moderate (posteriorly eccentric) mitral regurgitation. Mild pulmonic regurgitation. Moderate tricuspid valve regurgitation. Trace aortic valve regurgitation. Systolic pulmonary artery pressure (SPAP) estimated at 44 mmHg (right atrial   pressure 3 mmHg), consistent with mild pulmonary hypertension. Irregular heart rate throughout study. Cath:  [facility] 4/6/05:  FINDINGS:     CORONARY ARTERIOGRAPHY: Coronary arteriography of this   co-dominant system revealed no significant stenoses of the LMCA,   LAD, left circumflex or RCA. LEFT VENTRICULOGRAPHY: Left ventriculogram in the RANGEL projection   in the setting of ectopy revealed normal left ventricular   function with an estimated ejection fraction of greater then 50%   and trace mitral regurgitation. IMPRESSION: No significant coronary artery disease, probably   normal left ventricular function. PLAN:   Medical therapy. We will arrange a MUGA to definitively assess LV function. Other Studies:   Chest X-ray 10/30/21:  Mild cardiomegaly and COPD.  No other cardiopulmonary disease. Assessment and Plan      1. Acute biventricular systolic heart failure - TTE from this admission shows reduction in LVEF to 35-40% from normal on last TTE from [facility] in 6/2020. Possible that cardiomyopathy is tachycardia-induced in setting of persistent underlying atrial fibrillation, but will need formal ischemic evaluation.   She was felt to be hypovolemic on admission due to her nausea/vomiting, and diarrhea and received IVF, but now appears to be at least mild-moderately hypervolemic.  -Please keep NPO after midnight and will plan for invasive coronary angiography/LHC/RHC tomorrow to further evaluate coronary anatomy and intravascular hemodynamics  -Hold Eliquis in preparation for procedure  -Recommend diuresing with IV lasix 20 mg BID  -Will switch from coreg to metoprolol succinate 50 mg BID and titrate as needed to achieve adequate ventricular rate control as dicussed further below  -Continue lisinopril 5 mg daily  -Monitor strict I/Os, obtain daily standing weights  -Low sodium diet  -Trend renal panels and creatinine and     2. Atrial fibrillation with RVR - Likely exacerbated by underlying infection. Ventricular rate currently in 110-120s. Left atrium noted to be severely dilated on TTE     -Hold Eliquis in preparation for invasive angiography/LHC/RHC tomorrow  -Switch from coreg to metoprolol succinate 50 mg BID and titrate as needed to achieve adequate ventricular rate control  -Will plan for eventual ROBERTO/DCCV once patient is better compensated from a heart failure standpoint and has recovered from her underlying infection    3. Pulmonary hypertension - Likely secondary to left-sided heart failure. SPAP estimated at 44 mmHg on echo. -Management as above    4. Mild-moderate mitral regurgitation  -Appears stable    5. UTI/Sepsis  -Blood and urine cultures grew E. Coli  -Continue antibiotics per primary team    6. SHAY   -Resolved  -Was likely prerenal on admission due to GI losses and received IVF, now appears to be hypervolemic as above    7. Former tobacco use  -Continue to encourage avoidance of tobacco products      Thank you for allowing us to participate in the care of Gideon Josue. Please call me with any questions [phone].       Jimena Rivas DO  [facility]  [phone] [facility]  [phone] [address] [place] [address]  11/4/2021 1:38 PM      I will address the patient's cardiac risk factors and adjusted pharmacologic treatment as needed. In addition, I have reinforced the need for patient directed risk factor modification. All questions and concerns were addressed to the patient/family. Alternatives to my treatment were discussed. The note was completed using EMR. Every effort was made to ensure accuracy; however, inadvertent computerized transcription errors may be present.

## 2021-11-04 NOTE — FLOWSHEET NOTE
11/03/21 2041   Assessment   Charting Type Shift assessment   Neurological   Neuro (WDL) WDL   Level of Consciousness Alert (0)   Jeanne Coma Scale   Eye Opening 4   Best Verbal Response 5   Best Motor Response 6   Jeanne Coma Scale Score 15   HEENT   HEENT (WDL) X   Right Eye Intact; Impaired vision   Left Eye Intact; Impaired vision   Nose Intact   Throat Intact   Respiratory   Respiratory (WDL) WDL   Respiratory Pattern Regular   Respiratory Depth Normal   Chest Assessment Chest expansion symmetrical   L Breath Sounds Diminished   R Breath Sounds Diminished   Breath Sounds   Right Upper Lobe Diminished   Right Middle Lobe Diminished   Right Lower Lobe Diminished   Left Upper Lobe Diminished   Left Lower Lobe Diminished   Cardiac   Cardiac (WDL) X   Cardiac Regularity Irregular   Heart Sounds S1, S2   Cardiac Rhythm A flutter;Atrial fib;PVC   Cardiac Monitor   Telemetry Monitor On Yes   Telemetry Audible Yes   Telemetry Alarms Set Yes   Gastrointestinal   Abdominal (WDL) WDL   Abdomen Inspection Rounded; Soft   RUQ Bowel Sounds Active   Peripheral Vascular   Peripheral Vascular (WDL) WDL   Edema None   Skin Color/Condition   Skin Color/Condition (WDL) WDL   Skin Integrity   Skin Integrity (WDL) X   Skin Integrity Bruising   Location scattered    Musculoskeletal   Musculoskeletal (WDL) WDL   Genitourinary   Genitourinary (WDL) WDL   Flank Tenderness No   Suprapubic Tenderness No   Dysuria No   Urine Assessment   Incontinence Yes   Anus/Rectum   Anus/Rectum (WDL) WDL   Psychosocial   Psychosocial (WDL) WDL

## 2021-11-04 NOTE — CARE COORDINATION
Patient feels fatigued today and is SOB per progress notes. WBC decreased to 18.8. Echo showed decreased EF to 35-40%, left atrium severely dilated. Getting IV lasix BID. Cardiology consulted. No diarrhea for the last 72 hours. Taken out of C-DIFF isolation. Getting IVABX for positive blood cultures. Patient and spouse agreed to home care yesterday. Referral was initiated to [facility]. They are following for home care needs at d/c. Will continue to follow and assist with d/c plan.

## 2021-11-04 NOTE — PROGRESS NOTES
Occupational Therapy  Facility/Department: [facility] C4 PCU  Daily Treatment Note  NAME: Saad Marsh  : 1940  MRN: [id number]    Date of Service: 2021    Discharge Recommendations:  24 hour supervision or assist       Assessment   Performance deficits / Impairments: Decreased functional mobility ; Decreased ADL status; Decreased safe awareness;Decreased endurance;Decreased balance  Assessment: Pt pleasant and cooperative, fatigues easily with min activity. Pt reports fatigue with minimal activity, and requires increased rest breaks with min activity. Pt requires increased time to complete ADL tasks, easily distracted by conversation with . Pt cont to require assist for LB ADLs, CGA/min A for transfers, more assist when fatigued. Cont to recommend home with 24 hr, HHOT at discharge. OT Education: OT Role;Plan of Care;ADL Adaptive Strategies;Transfer Training;Equipment  Patient Education: Disease specific: ADLs, transfers, energy conservation, PLB  REQUIRES OT FOLLOW UP: Yes  Safety Devices  Safety Devices in place: Yes  Type of devices: Nurse notified;Call light within reach; Chair alarm in place; Left in chair;Gait belt         Patient Diagnosis(es): The primary encounter diagnosis was Acute renal failure, unspecified acute renal failure type ([address] Diagnoses of Rapid atrial fibrillation ([address] Urinary tract infection with hematuria, site unspecified, Hypomagnesemia, and Nausea, vomiting, and diarrhea were also pertinent to this visit. has a past medical history of Hypertension and Thyroid disease. has a past surgical history that includes Hemorrhoid surgery; Hysterectomy; Tonsillectomy; and Knee arthroscopy (Right, 3/10/2016).     Restrictions  Restrictions/Precautions  Restrictions/Precautions: General Precautions, Fall Risk, Contact Precautions  Position Activity Restriction  Other position/activity restrictions: high fall risk per nursing assessment, up with assistance,  O2, telemetry, IV lines  Subjective   General  Chart Reviewed: Yes  Patient assessed for rehabilitation services?: Yes  Additional Pertinent Hx: Pt admitted to ED with c/o nausea and vomiting. Admitted for ARF and treatment for renal failure. COVID (-). PMHx includes:  Hypertension and Thyroid disease. Family / Caregiver Present: No  Referring Practitioner: Alyson Hernandez DO 11/01/21  Diagnosis: hypomagnesemia  Subjective  Subjective: Pt in chair, agreeable  General Comment  Comments: RN clears for therapy  Pain Assessment  Pain Type: Acute pain  Pain Location: Generalized  Vital Signs  Patient Currently in Pain: No   Orientation  Orientation  Overall Orientation Status: Within Functional Limits  Objective    ADL  Feeding: Independent  Grooming: Supervision  UE Bathing: Stand by assistance  LE Bathing: Moderate assistance  LE Dressing: Moderate assistance  Toileting: Dependent/Total (pure wick)        Balance  Sitting Balance: Supervision  Standing Balance: Contact guard assistance  Standing Balance  Time: ~30 sec x 2  Activity: ADl tasks     Transfers  Sit to stand: Contact guard assistance  Stand to sit: Contact guard assistance                       Cognition  Overall Cognitive Status: Exceptions  Arousal/Alertness: Delayed responses to stimuli  Following Commands: Follows one step commands with increased time; Follows one step commands with repetition  Attention Span: Attends with cues to redirect  Memory: Decreased short term memory  Safety Judgement: Decreased awareness of need for assistance  Problem Solving: Decreased awareness of errors  Insights: Decreased awareness of deficits  Initiation: Requires cues for some  Sequencing: Requires cues for some     Plan   Plan  Times per week: 3-5x's a week while in acute care    AM-PAC Score        AM-[facility] Inpatient Daily Activity Raw Score: 15 (11/04/21 1645)  AM-PAC Inpatient ADL T-Scale Score : 34.69 (11/04/21 1645)  ADL Inpatient CMS 0-100% Score: 56.46 (11/04/21 1645)  ADL Inpatient CMS G-Code Modifier : CK (11/04/21 4023)    Goals  Short term goals  Time Frame for Short term goals: 1 week unless otherwise specified 11/9  Short term goal 1: Pt will complete LE dressing with CGA-ongoing  Short term goal 2: Pt will complete toilet transfers with SBA by 11/06-ongoing  Short term goal 3: Pt will complete standing level ADLs with SBA-ongoing  Patient Goals   Patient goals : \"to be able to get up and go home\"       Therapy Time   Individual Concurrent Group Co-treatment   Time In 1422         Time Out 1516         Minutes 54         Timed Code Treatment Minutes: 47 Minutes      If pt discharges prior to next session, this note will serve as discharge summary. See case management note for discharge disposition.      Susan Dhillon, OT

## 2021-11-04 NOTE — PROGRESS NOTES
Physical Therapy  Facility/Department: [facility] C4 PCU  Daily Treatment Note  NAME: Sanjay Clay  : 1940  MRN: [id number]    Date of Service: 2021    Discharge Recommendations:  24 hour supervision or assist, Home with Home health PT   PT Equipment Recommendations  Other: CTA as pt progresses with PT -- anticipate no equipment needs as pt improves during LOS    Assessment   Body structures, Functions, Activity limitations: Decreased functional mobility ; Decreased strength;Decreased endurance;Decreased balance  Assessment: Pt currently weak and deconditioned following period of prolonged bedrest in hospital, requiring CGA x 1 for STS transfers and min A x 1 using a rw 5 ft. Recommend pt return home with 24-hr sup/assist from family and home PT. Treatment Diagnosis: Generalized weakness  Prognosis: Good  Decision Making: Medium Complexity  PT Education: Goals; General Safety;PT Role;Plan of Care;Disease Specific Education; Functional Mobility Training;Precautions;Transfer Training;Energy Conservation;Equipment  Patient Education: Disease-specific education: Pt educated on role of PT in hospital and benefits of regular OOB mobility while in hospital to decrease risks of prolonged immobility. REQUIRES PT FOLLOW UP: Yes  Activity Tolerance  Activity Tolerance: Patient Tolerated treatment well;Patient limited by endurance  Activity Tolerance: Pt self-limited with participation 2* c/o fatigue, mod encouragement needed throughout session for OOB activity. VS during session: 127/74    o2 97% on 2L spo2     Patient Diagnosis(es): The primary encounter diagnosis was Acute renal failure, unspecified acute renal failure type ([address] Diagnoses of Rapid atrial fibrillation ([address] Urinary tract infection with hematuria, site unspecified, Hypomagnesemia, and Nausea, vomiting, and diarrhea were also pertinent to this visit. has a past medical history of Hypertension and Thyroid disease.    has a past surgical history that includes Hemorrhoid surgery; Hysterectomy; Tonsillectomy; and Knee arthroscopy (Right, 3/10/2016). Restrictions  Restrictions/Precautions  Restrictions/Precautions: General Precautions, Fall Risk, Contact Precautions  Position Activity Restriction  Other position/activity restrictions: Contact precautions 2* C. diff r/o, high fall risk per nursing assessment, up with assistance, 1L O2, telemetry, IV lines  Subjective   General  Chart Reviewed: Yes  Family / Caregiver Present: No  Referring Practitioner: Bill Andrade,   Subjective  Subjective: Pt agreeable to work with PT this morning, agreeable to try transferring OOB  General Comment  Comments: Pt resting in bed upon entry of PT  Pain Screening  Patient Currently in Pain: No  Vital Signs  Patient Currently in Pain: No       Orientation  Orientation  Overall Orientation Status: Within Normal Limits  Cognition      Objective   Bed mobility  Supine to Sit: Minimal assistance (To R, HOB elev)  Transfers  Sit to Stand: Contact guard assistance  Stand to sit: Contact guard assistance  Bed to Chair: Contact guard assistance (RW)  Ambulation  Ambulation?: Yes  More Ambulation?: No (pt c/o fatigue)  Ambulation 1  Surface: level tile  Device: Rolling Walker  Assistance: Minimal assistance  Quality of Gait: Mildly unsteady w/o LOB  Gait Deviations: Slow Magalie;Decreased step length;Decreased step height  Distance: 5'  Comments: O2 sats >93% with this while on 2L spo2     Balance  Posture: Good  Sitting - Static: Good  Sitting - Dynamic: Good  Standing - Static: Good;-  Standing - Dynamic: Fair;+ (rw)  Exercises  Heelslides: 12 B  Hip Flexion: 12 B  Hip Abduction: 12 B  Knee Long Arc Quad: 12 B  Ankle Pumps: 12 B         Comment: Instructed in PLB and in the use of the IS; pt performed x10.          AM-PAC Score  AM-PAC Inpatient Mobility Raw Score : 17 (11/04/21 1318)  AM-PAC Inpatient T-Scale Score : 42.13 (11/04/21 1318)  Mobility Inpatient CMS 0-100% Score: 50.57 (11/04/21 1318)  Mobility Inpatient CMS G-Code Modifier : CK (11/04/21 1318)          Goals  Short term goals  Time Frame for Short term goals: 1 week, 11/09/21 (unless otherwise specified)  Short term goal 1: Pt will transfer supine <-> sit with modified(I) - 11/04 min A  Short term goal 2: Pt will transfer sit <-> stand and bed>chair with modified(I)   - 11/04 CGA  Short term goal 3: Pt will ambulate x 100 feet without AD with supervision   - 11/04 min A rw 5'  Short term goal 4: By 11/04/21: Pt will tolerate 12-15 reps BLE exercise for strengthening, balance, and endurance   - 11/04 initiated  Patient Goals   Patient goals : \"To go home and get to feeling better\"    Plan    Plan  Times per week: 3-5x/week in acute care  Times per day: Daily  Specific instructions for Next Treatment: Progress ther ex and mobility as tolerated  Current Treatment Recommendations: Strengthening, Balance Training, Endurance Training, Functional Mobility Training, Transfer Training, Gait Training, Home Exercise Program, Safety Education & Training, Equipment Evaluation, Education, & procurement, Stair training  Safety Devices  Type of devices:  All fall risk precautions in place, Left in chair, Call light within reach, Chair alarm in place, Nurse notified, Gait belt, Patient at risk for falls     Therapy Time   Individual Concurrent Group Co-treatment   Time In 1222         Time Out 1300         Minutes 38         Timed Code Treatment Minutes: [address]

## 2021-11-04 NOTE — PROGRESS NOTES
flush 0.9 % injection 10 mL, 2 times per day  sodium chloride flush 0.9 % injection 10 mL, PRN  0.9 % sodium chloride infusion, PRN  magnesium sulfate 2000 mg in 50 mL IVPB premix, PRN  acetaminophen (TYLENOL) tablet 650 mg, Q6H PRN   Or  acetaminophen (TYLENOL) suppository 650 mg, Q6H PRN  glucose (GLUTOSE) 40 % oral gel 15 g, PRN  dextrose 50 % IV solution, PRN  glucagon (rDNA) injection 1 mg, PRN  dextrose 5 % solution, PRN  carvedilol (COREG) tablet 6.25 mg, BID  levothyroxine (SYNTHROID) tablet 100 mcg, Daily  apixaban (ELIQUIS) tablet 2.5 mg, BID  sodium chloride flush 0.9 % injection 5-40 mL, 2 times per day  sodium chloride flush 0.9 % injection 5-40 mL, PRN  0.9 % sodium chloride infusion, PRN  ondansetron (ZOFRAN) injection 4 mg, Q1H PRN        Review of Systems:   14 point ROS obtained but were negative except mentioned in HPI      Physical exam:     Vitals:  BP (!) 148/83   Pulse 94   Temp 97.3 °F (36.3 °C) (Oral)   Resp 16   Ht 5' 6\" (1.676 m)   Wt 165 lb 2 oz (74.9 kg)   SpO2 90%   BMI 26.65 kg/m²   Constitutional:  OAA X3 NAD  Skin: no rash, turgor wnl  Heent:  eomi, mmm  Neck: no bruits or jvd noted  Cardiovascular:  S1, S2 without m/r/g  Respiratory: CTA B without w/r/r  Abdomen:  +bs, soft, nt, nd  Ext: no  lower extremity edema  Psychiatric: mood and affect appropriate  Musculoskeletal:  Rom, muscular strength intact    Data:   Labs:  CBC:   Recent Labs     11/02/21  0806 11/03/21  0517 11/04/21  0526   WBC 17.3* 19.0* 18.8*   HGB 12.3 11.3* 12.3    168 235     BMP:    Recent Labs     11/02/21  0806 11/03/21  0517 11/04/21  0526   * 131* 137   K 3.3* 3.4* 3.7   CL 93* 98* 101   CO2 24 24 26   BUN 27* 23* 22*   CREATININE 1.2 1.0 0.9   GLUCOSE 111* 136* 117*     Ca/Mg/Phos:   Recent Labs     11/02/21  0806 11/03/21  0517 11/04/21  0526   CALCIUM 8.1* 8.4 8.9   MG 2.20 2.20  --      Hepatic:   No results for input(s): AST, ALT, ALB, BILITOT, ALKPHOS in the last 72 hours.  Troponin:   No results for input(s): TROPONINI in the last 72 hours. BNP: No results for input(s): BNP in the last 72 hours. Lipids: No results for input(s): CHOL, TRIG, HDL, LDLCALC, LABVLDL in the last 72 hours. ABGs: No results for input(s): PHART, PO2ART, OAI9FNB in the last 72 hours. INR: No results for input(s): INR in the last 72 hours. UA:  No results for input(s): [place] Angela, GLUCOSEU, BILIRUBINUR, KETUA, SPECGRAV, BLOODU, PHUR, PROTEINU, UROBILINOGEN, NITRU, LEUKOCYTESUR, Arvell Roes in the last 72 hours. Urine Microscopic:   No results for input(s): LABCAST, BACTERIA, COMU, HYALCAST, WBCUA, RBCUA, EPIU in the last 72 hours. Urine Culture:   No results for input(s): LABURIN in the last 72 hours. Urine Chemistry:   Recent Labs     11/01/21  1645   LABCREA 63.7   NAUR <20             IMAGING:  CT ABDOMEN PELVIS WO CONTRAST Additional Contrast? None   Final Result   1. No acute finding in the abdomen or pelvis. 2. Cholelithiasis and diverticulosis noted incidentally. 3. Trace pleural effusions in the lower chest with peripheral airspace   opacities. Correlate with any pulmonary symptoms. A developing viral   infection or pulmonary edema may be considered. XR CHEST PORTABLE   Final Result   Mild cardiomegaly and COPD. No other cardiopulmonary disease. Assessment/Plan   1. Hyponatremia      Serum Na 122  -----> 126    Etiology could be hypovolemic hyponatremia due to vomiting , diarrhea   Poor solute intake VS SIADH    Volume status : hypovolemic side    Urine Na < 20     Plan    Serum Na improved    DC salt tablet    Encourage high protein/solute diet    Moniter serum Na every 12 h      2. NAGMA 2/2 diarrhea    improved    3.  CKD stage 3    [facility]    Serum cr stable    Avoid NSAIDs      4 N/V/D    improved      5 Hypokalemia    Replace with KCL prn                  Thank you for allowing us to participate in care of [address] [place] [address] Renetta Jaeger MD  Feel free to contact me   [facility] [address]  Office : [phone]  Fax :[phone]

## 2021-11-04 NOTE — PROGRESS NOTES
Hospitalist Progress Note      PCP: Blair Gamble MD    Date of Admission: 10/30/2021    Chief Complaint: Nausea, fatigue and diarrhea     Subjective:   Patient states that she is very short of breath this morning. She feels very fatigued and her muscles ache. She denies diarrhea today. Medications:  Reviewed    Infusion Medications    sodium chloride 25 mL (11/02/21 1244)    dextrose      sodium chloride       Scheduled Medications    cefTRIAXone (ROCEPHIN) IV  2,000 mg IntraVENous Q24H    sodium chloride  1 g Oral BID WC    sodium chloride flush  10 mL IntraVENous 2 times per day    carvedilol  6.25 mg Oral BID    levothyroxine  100 mcg Oral Daily    apixaban  2.5 mg Oral BID    sodium chloride flush  5-40 mL IntraVENous 2 times per day     PRN Meds: perflutren lipid microspheres, calcium carbonate, sodium chloride flush, sodium chloride, magnesium sulfate, acetaminophen **OR** acetaminophen, glucose, dextrose, glucagon (rDNA), dextrose, sodium chloride flush, sodium chloride, ondansetron      Intake/Output Summary (Last 24 hours) at 11/4/2021 0845  Last data filed at 11/4/2021 0454  Gross per 24 hour   Intake 340 ml   Output 2050 ml   Net -1710 ml       Physical Exam Performed:  BP (!) 148/83   Pulse 94   Temp 97.3 °F (36.3 °C) (Oral)   Resp 16   Ht 5' 6\" (1.676 m)   Wt 165 lb 2 oz (74.9 kg)   SpO2 90%   BMI 26.65 kg/m²     General appearance: No apparent distress, appears stated age and cooperative  HEENT: Conjunctivae/corneas clear. Neck: No jugular venous distention. Respiratory:  Normal respiratory effort. Clear to auscultation, bilaterally without Rales/Wheezes/Rhonchi. Cardiovascular: Regular rate and rhythm with normal S1/S2 without murmurs, rubs or gallops. Abdomen: Soft, non-distended, non-tender  Musculoskeletal: No clubbing, cyanosis or edema bilaterally. Skin: Skin color, texture, turgor normal.  No rashes or lesions.   Neurologic:  Neurovascularly intact without any focal sensory/motor deficits. Psychiatric: Alert and oriented, thought content appropriate, normal insight      Labs:   Recent Labs     11/02/21  0806 11/03/21  0517 11/04/21  0526   WBC 17.3* 19.0* 18.8*   HGB 12.3 11.3* 12.3   HCT 35.6* 33.6* 36.7    168 235     Recent Labs     11/02/21  0806 11/03/21  0517 11/04/21  0526   * 131* 137   K 3.3* 3.4* 3.7   CL 93* 98* 101   CO2 24 24 26   BUN 27* 23* 22*   CREATININE 1.2 1.0 0.9   CALCIUM 8.1* 8.4 8.9     Urinalysis:    Lab Results   Component Value Date    NITRU Negative 10/30/2021    WBCUA >100 10/30/2021    BACTERIA 3+ 10/30/2021    RBCUA see below 10/30/2021    BLOODU MODERATE 10/30/2021    SPECGRAV 1.020 10/30/2021    GLUCOSEU Negative 10/30/2021       Radiology:  CT ABDOMEN PELVIS WO CONTRAST Additional Contrast? None   Final Result   1. No acute finding in the abdomen or pelvis. 2. Cholelithiasis and diverticulosis noted incidentally. 3. Trace pleural effusions in the lower chest with peripheral airspace   opacities. Correlate with any pulmonary symptoms. A developing viral   infection or pulmonary edema may be considered. XR CHEST PORTABLE   Final Result   Mild cardiomegaly and COPD. No other cardiopulmonary disease. Active Hospital Problems    Diagnosis     [facility] [A41.9]      Assessment/Plan:  1. Acute E. coli cystitis with Bacteremia POA  - WBC has decreased to 18.8  - CXR showed no acute process  - Blood cultures x2 grew E. Coli , pan sensitive with resistance to bactrim   - Urine culture showed E. coli   - Covid negative   - Vancomycin x1 on 10/30  - Discontinue Zosyn (3 days)   - Continue 2 g of ceftriaxone   - Repeat blood culture shows no growth to date     2.  Acute on Chronic combined systolic and Diastolic Heart Failure -newly diagnosed this admission  - Echo showed decreased EF to 35-40%, left atrium is severely dilated  - BNP increased to 8372 from 7318 on admission   - EKG showed no evidence of ischemic changes  - Troponins negative on admission   - Strict I/Os   - Daily weights   - 20 mg Lasix IV BID   - Cardiology consulted - Dr. Marycarmen Carvajal, appreciate their input     3. SHAY POA  - Resolved with medical therapy   - Etiology unknown. Likely secondary to dehydration, sepsis  - Nephrology following and assisting with management  - Cr of 0.9 from 1.7 on admission   - Will continue to monitor     4. Electrolyte disturbance   - Hyponatremia: resolved with medical therapy   - Hypokalemia: resolved with medical therapy   - Hypocalcemia: resolved with medial therapy   - Hypomagnesemia: resolved with medical therapy    - Nephrology following and assisting with management    5. A. Fib with RVR   - Rate controlled post bolus of amiodarone on 10/31  - Continue Eliquis for stroke prophylaxis. 6. Diarrhea  - No diarrhea for the last 72 hours   - Taken out of C. Diff isolation     7. Hyperglycemia  - A1C of 5.7%   - Will continue to monitor     **Tried to reach daughter, Umer Wagner at [phone], to update on plan. However there was no answer and her voicemail was full. **    DVT Prophylaxis: Eliquis   Diet: ADULT DIET; Regular  Code Status: Full Code    PT/OT Eval Status: 24 hour supervision or assist, home with home health PT/OT     Dispo - likely in 1-2 days pending clinical improvement     Grady Zee DO     Addendum to Resident  Progress note:  Pt seen,examined and evaluated with resident and discussed regarding POC . I have reviewed the current history, physical findings, labs and assessment and plan and agree with note as documented by resident DO ( Dr. Latoya Welch )    Patient seen and examined with resident team this morning. Patient reports feeling chest congestion and shortness of breath. Noted proBNP elevated. Received 40 mg of p.o. Lasix on 11/3/2021 with increased urine output approximately 2 L negative overnight.   Echocardiogram showed new systolic heart failure EF 35 to 94%; grade 3 diastolic dysfunction along with severely dilated LA. Continue IV Lasix 20 mg twice daily along with strict I&O, daily weights monitoring and will get CHF protocol order set. Will request cardiology consultation to assist with management    -Leukocytosis slowly improving with current IV antibiotic therapy. Repeat blood cultures on 11/3/2021 no growth to date.   Will follow    Bhumi Mendoza MD  Hospitalist Physician

## 2021-11-05 ENCOUNTER — APPOINTMENT (OUTPATIENT)
Dept: CARDIAC CATH/INVASIVE PROCEDURES | Age: 81
DRG: 871 | End: 2021-11-05
Payer: MEDICARE

## 2021-11-05 LAB
ANION GAP SERPL CALCULATED.3IONS-SCNC: 10 MMOL/L (ref 3–16)
ANISOCYTOSIS: ABNORMAL
ATYPICAL LYMPHOCYTE RELATIVE PERCENT: 1 % (ref 0–6)
BANDED NEUTROPHILS RELATIVE PERCENT: 9 % (ref 0–7)
BASOPHILS ABSOLUTE: 0 K/UL (ref 0–0.2)
BASOPHILS RELATIVE PERCENT: 0 %
BUN BLDV-MCNC: 22 MG/DL (ref 7–20)
CALCIUM SERPL-MCNC: 8.8 MG/DL (ref 8.3–10.6)
CHLORIDE BLD-SCNC: 98 MMOL/L (ref 99–110)
CO2: 27 MMOL/L (ref 21–32)
CREAT SERPL-MCNC: 1.1 MG/DL (ref 0.6–1.2)
EOSINOPHILS ABSOLUTE: 0.4 K/UL (ref 0–0.6)
EOSINOPHILS RELATIVE PERCENT: 2 %
GFR AFRICAN AMERICAN: 58
GFR NON-AFRICAN AMERICAN: 48
GLUCOSE BLD-MCNC: 130 MG/DL (ref 70–99)
HCT VFR BLD CALC: 36.4 % (ref 36–48)
HEMOGLOBIN: 12.2 G/DL (ref 12–16)
LYMPHOCYTES ABSOLUTE: 2.5 K/UL (ref 1–5.1)
LYMPHOCYTES RELATIVE PERCENT: 12 %
MAGNESIUM: 1.7 MG/DL (ref 1.8–2.4)
MCH RBC QN AUTO: 29.3 PG (ref 26–34)
MCHC RBC AUTO-ENTMCNC: 33.6 G/DL (ref 31–36)
MCV RBC AUTO: 87.1 FL (ref 80–100)
METAMYELOCYTES RELATIVE PERCENT: 2 %
MONOCYTES ABSOLUTE: 1.6 K/UL (ref 0–1.3)
MONOCYTES RELATIVE PERCENT: 8 %
NEUTROPHILS ABSOLUTE: 15 K/UL (ref 1.7–7.7)
NEUTROPHILS RELATIVE PERCENT: 66 %
PDW BLD-RTO: 13.9 % (ref 12.4–15.4)
PLATELET # BLD: 306 K/UL (ref 135–450)
PMV BLD AUTO: 8 FL (ref 5–10.5)
POTASSIUM SERPL-SCNC: 3.3 MMOL/L (ref 3.5–5.1)
RBC # BLD: 4.17 M/UL (ref 4–5.2)
SODIUM BLD-SCNC: 135 MMOL/L (ref 136–145)
TOXIC GRANULATION: PRESENT
WBC # BLD: 19.5 K/UL (ref 4–11)

## 2021-11-05 PROCEDURE — 6370000000 HC RX 637 (ALT 250 FOR IP): Performed by: INTERNAL MEDICINE

## 2021-11-05 PROCEDURE — 2580000003 HC RX 258: Performed by: INTERNAL MEDICINE

## 2021-11-05 PROCEDURE — 6360000002 HC RX W HCPCS: Performed by: INTERNAL MEDICINE

## 2021-11-05 PROCEDURE — 2700000000 HC OXYGEN THERAPY PER DAY

## 2021-11-05 PROCEDURE — B2151ZZ FLUOROSCOPY OF LEFT HEART USING LOW OSMOLAR CONTRAST: ICD-10-PCS | Performed by: INTERNAL MEDICINE

## 2021-11-05 PROCEDURE — B2111ZZ FLUOROSCOPY OF MULTIPLE CORONARY ARTERIES USING LOW OSMOLAR CONTRAST: ICD-10-PCS | Performed by: INTERNAL MEDICINE

## 2021-11-05 PROCEDURE — 6360000002 HC RX W HCPCS

## 2021-11-05 PROCEDURE — C1894 INTRO/SHEATH, NON-LASER: HCPCS

## 2021-11-05 PROCEDURE — C1769 GUIDE WIRE: HCPCS

## 2021-11-05 PROCEDURE — 85025 COMPLETE CBC W/AUTO DIFF WBC: CPT

## 2021-11-05 PROCEDURE — 6360000002 HC RX W HCPCS: Performed by: HOSPITALIST

## 2021-11-05 PROCEDURE — 93460 R&L HRT ART/VENTRICLE ANGIO: CPT

## 2021-11-05 PROCEDURE — 36415 COLL VENOUS BLD VENIPUNCTURE: CPT

## 2021-11-05 PROCEDURE — 4A023N7 MEASUREMENT OF CARDIAC SAMPLING AND PRESSURE, LEFT HEART, PERCUTANEOUS APPROACH: ICD-10-PCS | Performed by: INTERNAL MEDICINE

## 2021-11-05 PROCEDURE — 2060000000 HC ICU INTERMEDIATE R&B

## 2021-11-05 PROCEDURE — 6360000004 HC RX CONTRAST MEDICATION

## 2021-11-05 PROCEDURE — 94761 N-INVAS EAR/PLS OXIMETRY MLT: CPT

## 2021-11-05 PROCEDURE — 2500000003 HC RX 250 WO HCPCS

## 2021-11-05 PROCEDURE — 99232 SBSQ HOSP IP/OBS MODERATE 35: CPT | Performed by: HOSPITALIST

## 2021-11-05 PROCEDURE — C1887 CATHETER, GUIDING: HCPCS

## 2021-11-05 PROCEDURE — 2709999900 HC NON-CHARGEABLE SUPPLY

## 2021-11-05 PROCEDURE — 85347 COAGULATION TIME ACTIVATED: CPT

## 2021-11-05 PROCEDURE — 83735 ASSAY OF MAGNESIUM: CPT

## 2021-11-05 PROCEDURE — 2580000003 HC RX 258: Performed by: EMERGENCY MEDICINE

## 2021-11-05 PROCEDURE — 80048 BASIC METABOLIC PNL TOTAL CA: CPT

## 2021-11-05 PROCEDURE — 93460 R&L HRT ART/VENTRICLE ANGIO: CPT | Performed by: INTERNAL MEDICINE

## 2021-11-05 PROCEDURE — 6370000000 HC RX 637 (ALT 250 FOR IP)

## 2021-11-05 RX ORDER — POTASSIUM CHLORIDE 20 MEQ/1
40 TABLET, EXTENDED RELEASE ORAL ONCE
Status: COMPLETED | OUTPATIENT
Start: 2021-11-05 | End: 2021-11-05

## 2021-11-05 RX ORDER — ASPIRIN 81 MG/1
81 TABLET, CHEWABLE ORAL DAILY
Status: DISCONTINUED | OUTPATIENT
Start: 2021-11-06 | End: 2021-11-09 | Stop reason: HOSPADM

## 2021-11-05 RX ORDER — POTASSIUM CHLORIDE 20 MEQ/1
20 TABLET, EXTENDED RELEASE ORAL ONCE
Status: DISCONTINUED | OUTPATIENT
Start: 2021-11-05 | End: 2021-11-05

## 2021-11-05 RX ORDER — SODIUM CHLORIDE 9 MG/ML
25 INJECTION, SOLUTION INTRAVENOUS PRN
Status: DISCONTINUED | OUTPATIENT
Start: 2021-11-05 | End: 2021-11-09 | Stop reason: HOSPADM

## 2021-11-05 RX ORDER — HEPARIN SODIUM 1000 [USP'U]/ML
INJECTION, SOLUTION INTRAVENOUS; SUBCUTANEOUS
Status: COMPLETED | OUTPATIENT
Start: 2021-11-05 | End: 2021-11-05

## 2021-11-05 RX ORDER — SODIUM CHLORIDE 0.9 % (FLUSH) 0.9 %
5-40 SYRINGE (ML) INJECTION PRN
Status: DISCONTINUED | OUTPATIENT
Start: 2021-11-05 | End: 2021-11-09 | Stop reason: HOSPADM

## 2021-11-05 RX ORDER — METOPROLOL SUCCINATE 50 MG/1
100 TABLET, EXTENDED RELEASE ORAL 2 TIMES DAILY
Status: DISCONTINUED | OUTPATIENT
Start: 2021-11-05 | End: 2021-11-08

## 2021-11-05 RX ORDER — ATORVASTATIN CALCIUM 80 MG/1
80 TABLET, FILM COATED ORAL NIGHTLY
Status: DISCONTINUED | OUTPATIENT
Start: 2021-11-05 | End: 2021-11-05

## 2021-11-05 RX ORDER — ASPIRIN 325 MG
325 TABLET ORAL ONCE
Status: COMPLETED | OUTPATIENT
Start: 2021-11-05 | End: 2021-11-05

## 2021-11-05 RX ORDER — ATORVASTATIN CALCIUM 40 MG/1
40 TABLET, FILM COATED ORAL NIGHTLY
Status: DISCONTINUED | OUTPATIENT
Start: 2021-11-05 | End: 2021-11-09 | Stop reason: HOSPADM

## 2021-11-05 RX ORDER — MIDAZOLAM HYDROCHLORIDE 5 MG/ML
INJECTION INTRAMUSCULAR; INTRAVENOUS
Status: COMPLETED | OUTPATIENT
Start: 2021-11-05 | End: 2021-11-05

## 2021-11-05 RX ORDER — MAGNESIUM SULFATE IN WATER 40 MG/ML
2000 INJECTION, SOLUTION INTRAVENOUS ONCE
Status: COMPLETED | OUTPATIENT
Start: 2021-11-05 | End: 2021-11-05

## 2021-11-05 RX ORDER — FUROSEMIDE 20 MG/1
20 TABLET ORAL DAILY
Status: DISCONTINUED | OUTPATIENT
Start: 2021-11-06 | End: 2021-11-09 | Stop reason: HOSPADM

## 2021-11-05 RX ORDER — SODIUM CHLORIDE 0.9 % (FLUSH) 0.9 %
5-40 SYRINGE (ML) INJECTION EVERY 12 HOURS SCHEDULED
Status: DISCONTINUED | OUTPATIENT
Start: 2021-11-05 | End: 2021-11-09 | Stop reason: HOSPADM

## 2021-11-05 RX ORDER — FENTANYL CITRATE 50 UG/ML
INJECTION, SOLUTION INTRAMUSCULAR; INTRAVENOUS
Status: COMPLETED | OUTPATIENT
Start: 2021-11-05 | End: 2021-11-05

## 2021-11-05 RX ORDER — ASPIRIN 81 MG/1
81 TABLET, CHEWABLE ORAL DAILY
Status: DISCONTINUED | OUTPATIENT
Start: 2021-11-05 | End: 2021-11-05

## 2021-11-05 RX ORDER — METOPROLOL SUCCINATE 50 MG/1
50 TABLET, EXTENDED RELEASE ORAL ONCE
Status: COMPLETED | OUTPATIENT
Start: 2021-11-05 | End: 2021-11-05

## 2021-11-05 RX ADMIN — ACETAMINOPHEN 650 MG: 325 TABLET ORAL at 21:13

## 2021-11-05 RX ADMIN — SODIUM CHLORIDE, PRESERVATIVE FREE 10 ML: 5 INJECTION INTRAVENOUS at 22:58

## 2021-11-05 RX ADMIN — LISINOPRIL 5 MG: 5 TABLET ORAL at 09:48

## 2021-11-05 RX ADMIN — SODIUM CHLORIDE, PRESERVATIVE FREE 10 ML: 5 INJECTION INTRAVENOUS at 09:49

## 2021-11-05 RX ADMIN — ACETAMINOPHEN 650 MG: 325 TABLET ORAL at 09:02

## 2021-11-05 RX ADMIN — CEFTRIAXONE SODIUM 2000 MG: 2 INJECTION, POWDER, FOR SOLUTION INTRAMUSCULAR; INTRAVENOUS at 14:07

## 2021-11-05 RX ADMIN — Medication 10 ML: at 21:16

## 2021-11-05 RX ADMIN — ATORVASTATIN CALCIUM 40 MG: 40 TABLET, FILM COATED ORAL at 21:13

## 2021-11-05 RX ADMIN — METOPROLOL SUCCINATE 100 MG: 50 TABLET, EXTENDED RELEASE ORAL at 21:13

## 2021-11-05 RX ADMIN — SODIUM CHLORIDE, PRESERVATIVE FREE 10 ML: 5 INJECTION INTRAVENOUS at 22:59

## 2021-11-05 RX ADMIN — LEVOTHYROXINE SODIUM 100 MCG: 0.1 TABLET ORAL at 05:54

## 2021-11-05 RX ADMIN — FENTANYL CITRATE 25 MCG: 50 INJECTION, SOLUTION INTRAMUSCULAR; INTRAVENOUS at 11:59

## 2021-11-05 ASSESSMENT — PAIN SCALES - GENERAL
PAINLEVEL_OUTOF10: 0
PAINLEVEL_OUTOF10: 5
PAINLEVEL_OUTOF10: 0
PAINLEVEL_OUTOF10: 2
PAINLEVEL_OUTOF10: 4
PAINLEVEL_OUTOF10: 0
PAINLEVEL_OUTOF10: 6
PAINLEVEL_OUTOF10: 0
PAINLEVEL_OUTOF10: 4

## 2021-11-05 ASSESSMENT — PAIN DESCRIPTION - PAIN TYPE: TYPE: ACUTE PAIN

## 2021-11-05 ASSESSMENT — PAIN DESCRIPTION - LOCATION: LOCATION: ARM

## 2021-11-05 NOTE — PROGRESS NOTES
Comprehensive Nutrition Assessment    Type and Reason for Visit:  Initial (LOS)    Nutrition Recommendations/Plan:   1. Recommend Regular diet until appetite returns  2. Patient may need clarification between her temporay high solute diet for hyponatremia and the transition back to low sodium diet for CHF when returning home  3. RD suggested high protein/calorie high solute drinks like milk and Ensure since po decreased  Gatorade when having diarrhea or vomiting; Gatorade could be problematic with having CHF if consumed in excess at home    Nutrition Assessment:  Patient admitted for sepsis. Diet advanced to regular to start at dinner. Patient would like Ensure plus vanilla (drinks at home). Nephrology requesting high solute diet at this time. RD discussed temporary need for increased Na but likely will not want long term with CHF history. RD encouraged protein containing high solute drinks, Gatorade if having vomiting or diarrhea. Will monitor need for further education with CHF history and current hyponatremia. Malnutrition Assessment:  Malnutrition Status: At risk for malnutrition (Comment)      Estimated Daily Nutrient Needs:  Energy (kcal):  5397-5090; Weight Used for Energy Requirements:  Ideal     Protein (g):  77-89; Weight Used for Protein Requirements:  Ideal (1.3-1.5)        Fluid (ml/day):  may need restriction with heart failure and hyponatremia; Method Used for Fluid Requirements:         Nutrition Related Findings:  Mg 1.7 this am; BM 1-3 daily;  BG less than 180 mg/dl this admission; patient reported having nausea and vomiting and pain in stomach just prior to admission; Wounds:   (scattered bruising)       Current Nutrition Therapies:    ADULT DIET;  Regular  ADULT ORAL NUTRITION SUPPLEMENT; Breakfast, Lunch, Dinner; Standard High Calorie/High Protein Oral Supplement    Anthropometric Measures:  · Height: 5' 6\" (167.6 cm)  · Current Body Weight: 165 lb 2 oz (74.9 kg) · Ideal Body Weight: 130 lbs; % Ideal Body Weight     · BMI: 26.7  · BMI Categories: Overweight (BMI 25.0-29. 9)       Nutrition Diagnosis:   · Increased nutrient needs related to increase demand for energy/nutrients as evidenced by wounds    Nutrition Interventions:   Food and/or Nutrient Delivery:  Start Oral Nutrition Supplement, Continue Current Diet  Nutrition Education/Counseling:  Education initiated   Coordination of Nutrition Care:  Continue to monitor while inpatient    Goals:  Patient will eat 50% or greater of meals and supplements. Nutrition Monitoring and Evaluation:   Behavioral-Environmental Outcomes:      Food/Nutrient Intake Outcomes:  Food and Nutrient Intake, Supplement Intake  Physical Signs/Symptoms Outcomes:  Nutrition Focused Physical Findings, Biochemical Data     Discharge Planning:     Too soon to determine     Electronically signed by Valentina Chowdhury, [address],  on 11/5/21 at 3:39 PM EDT    Contact: [phone]

## 2021-11-05 NOTE — PROCEDURES
CARDIAC CATHETERIZATION REPORT    Date of Procedure: 11/5/2021  : [address] [place] [address] DO Radha  Primary Indication: Dyspnea, acute biventricular systolic heart failure, cardiomyopathy    Procedures Performed:  1. Coronary angiography  2. Left heart catheterization  3. Right heart catheterization  4. Moderate conscious sedation    Procedural Details:  1. Access: Local anesthetic was given and access was obtained in the right radial artery using a micropuncture technique and a 6F Terumo Slender Sheath was placed without difficulty. A 5F sheath was placed in the right brachial vein via wire exchange of an existing IV. 2. Diagnostic: A 5F [place] Combe catheter was used to perform the right heart catheterization. A 5F TIG catheter was used to perform selective right and left coronary angiography. The 5F TIG catheter was also used to perform the left heart catheterization. No significant gradient was observed on pull-back of the catheter across the aortic valve. 3. Hemostasis: At the end of the procedure, the radial sheath was removed and a hemoband was placed over the arteriotomy site and filled with air to maintain hemostasis. The venous sheath was removed and manual pressure applied to maintain hemostasis. Findings:  1. Hemodynamics:  A. Right heart catheterization                   1. RA: 8 mmHg                   2. RV: 42/8 mmHg                   3. PA: 40/24 (30) mmHg                   4. PCWP: 17 mmHg                   5. Saturations: AO 92%, RA 59%, PA 56%                   6. Danish CO: 4.48 L/min                   7. Danish CI: 2.43 L/min*m2                   8. Danish SVR: 1,625 dyne*sec/cm5                   9. Danish PVR: 232 dyne*sec/cm5     B. Opening arterial pressure: 134/76 (99) mmHg      C. LVEDP: 15 mmHg    2. Coronary anatomy:  A. Left main artery: The left main artery bifurcates into the left anterior descending artery and left circumflex artery.   The left main artery has minor luminal irregularities. .  B. Left anterior descending artery: Transapical vessel which gives rise to 2 small diagonal arteries. The LAD has a 20% ostial stenosis, eccentric 40% proximal stenosis, and 30% mid-vessel stenosis. C. Left circumflex artery: Non-dominant vessel that gives rise to 3 obtuse marginal arteries and a posterolateral branch. The LCx has a 20% ostial stenosis. The remainder of the vessel is relatively free of disease. The OM1 is a very small vessel with a high origin. The OM2 is a large branching vessel with no angiographically significant disease. The left posterolateral branch has minor luminal irregularities. D. Right coronary artery: Dominant vessel. The RCA is medium caliber and has minor luminal irregularities. The right posterior descending artery has minor luminal irregularities. The right posterolateral branch has minor luminal irregularities. Technical Factors:  Complications: None. Estimated blood loss: Minimal.  Radiation: Air kerma 274 mGy and 1.7 minutes of fluoroscopy  Sedation: Moderate conscious sedation was administered by qualified nursing personnel under continuous hemodynamic monitoring, starting at 11:59 AM and ending at 12:22 PM.  Medications: 2.5 mg IA verapamil, 1 mg IV Versed, 25 mcg IV Fentanyl, 5,000 units IV heparin  Contrast: 30 cc of Isovue     Impression:  1. Non-obstructive coronary artery disease. 2. Non-ischemic cardiomyopathy. 3. Acute biventricular systolic heart failure with mildly elevated left and right-sided cardiac filling pressures. 4. Mild pulmonary hypertension. 5. Preserved Danish cardiac output and index. 6. Persistent atrial fibrillation. Plan:  1. Restart Eliquis 5 mg BID tomorrow. 2. Start aspirin 81 mg daily and atorvastatin 40 mg daily. 3. Increase metoprolol succinate to 100 mg BID for improved ventricular rate control. 4. Continue lisinopril 5 mg daily. 5. Transition from IV to PO lasix 20 mg daily.   6. Monitor strict I/Os, obtain daily standing weights. 7. 2L per day fluid restriction, low sodium diet. 8. Maintain K>4 and Mg>2.       Diamond Horan, [address]

## 2021-11-05 NOTE — PROGRESS NOTES
Occupational Therapy    Per RN pt off floor until this afternoon and will need to be a hold d/t procedure in cath lab. OT will continue POC tomorrow as time allows and pt medically appropriate.   Thank you,    Alissa Lutz OTR/L

## 2021-11-05 NOTE — PROGRESS NOTES
Hospitalist Progress Note      PCP: Susan Arias MD    Date of Admission: 10/30/2021    Chief Complaint: Nausea, fatigue and diarrhea     Subjective:   Patient states that she is feeling much better this morning. She states that her shortness of breath has improved. She is more talkative this morning. Medications:  Reviewed    Infusion Medications    sodium chloride 25 mL (11/02/21 1244)    dextrose      sodium chloride       Scheduled Medications    magnesium sulfate  2,000 mg IntraVENous Once    potassium chloride  40 mEq Oral Once    lisinopril  5 mg Oral Daily    metoprolol succinate  50 mg Oral Daily    furosemide  20 mg IntraVENous BID    cefTRIAXone (ROCEPHIN) IV  2,000 mg IntraVENous Q24H    sodium chloride flush  10 mL IntraVENous 2 times per day    levothyroxine  100 mcg Oral Daily    sodium chloride flush  5-40 mL IntraVENous 2 times per day     PRN Meds: perflutren lipid microspheres, calcium carbonate, sodium chloride flush, sodium chloride, magnesium sulfate, acetaminophen **OR** acetaminophen, glucose, dextrose, glucagon (rDNA), dextrose, sodium chloride flush, sodium chloride, ondansetron      Intake/Output Summary (Last 24 hours) at 11/5/2021 0849  Last data filed at 11/5/2021 0551  Gross per 24 hour   Intake 938.68 ml   Output 1300 ml   Net -361.32 ml       Physical Exam Performed:  BP (!) 156/91   Pulse 113   Temp 98 °F (36.7 °C) (Oral)   Resp 18   Ht 5' 6\" (1.676 m)   Wt 165 lb 2 oz (74.9 kg)   SpO2 95%   BMI 26.65 kg/m²     General appearance: No apparent distress, appears stated age and cooperative  HEENT: Conjunctivae/corneas clear. Neck: No jugular venous distention. Respiratory:  Normal respiratory effort. Clear to auscultation, bilaterally without Rales/Wheezes/Rhonchi. Cardiovascular: Regular rate and rhythm with normal S1/S2 without murmurs, rubs or gallops.   Abdomen: Soft, non-distended, non-tender  Musculoskeletal: No clubbing, cyanosis or edema bilaterally. Skin: Skin color, texture, turgor normal.  No rashes or lesions. Neurologic:  Neurovascularly intact without any focal sensory/motor deficits. Psychiatric: Alert and oriented, thought content appropriate, normal insight      Labs:   Recent Labs     11/03/21 0517 11/04/21 0526 11/05/21  0439   WBC 19.0* 18.8* 19.5*   HGB 11.3* 12.3 12.2   HCT 33.6* 36.7 36.4    235 306     Recent Labs     11/03/21 0517 11/04/21  0526 11/05/21  0439   * 137 135*   K 3.4* 3.7 3.3*   CL 98* 101 98*   CO2 24 26 27   BUN 23* 22* 22*   CREATININE 1.0 0.9 1.1   CALCIUM 8.4 8.9 8.8     Urinalysis:    Lab Results   Component Value Date    NITRU Negative 10/30/2021    WBCUA >100 10/30/2021    BACTERIA 3+ 10/30/2021    RBCUA see below 10/30/2021    BLOODU MODERATE 10/30/2021    SPECGRAV 1.020 10/30/2021    GLUCOSEU Negative 10/30/2021       Radiology:  CT ABDOMEN PELVIS WO CONTRAST Additional Contrast? None   Final Result   1. No acute finding in the abdomen or pelvis. 2. Cholelithiasis and diverticulosis noted incidentally. 3. Trace pleural effusions in the lower chest with peripheral airspace   opacities. Correlate with any pulmonary symptoms. A developing viral   infection or pulmonary edema may be considered. XR CHEST PORTABLE   Final Result   Mild cardiomegaly and COPD. No other cardiopulmonary disease. Active Hospital Problems    Diagnosis     Sepsis ([facility] [address] [A41.9]      Assessment/Plan:  1. Acute E. coli cystitis with Bacteremia POA  - WBC has increased to 19.5   - Symptomatically improving   - CXR showed no acute process  - Blood cultures x2 grew E. Coli , pan sensitive with resistance to bactrim   - Urine culture showed E. coli   - Covid negative   - Vancomycin x1 on 10/30  - Discontinue Zosyn (3 days)   - Continue 2 g of ceftriaxone   - Repeat blood culture shows no growth to date     2.  Acute Biventricular Systolic HF - newly diagnosed this admission  - Echo showed decreased EF to 35-40%, left atrium is severely dilated, compared to Echo completed at [facility] on 6/2020 which showed a normal EF of 52-57%   - BNP increased to 8372 from 7318 on admission   - EKG showed no evidence of ischemic changes  - Troponins negative on admission   - Strict I/Os, 2L fluid per day    - Net + of 125.1 since admission   - Daily weights   - 20 mg Lasix IV BID   - Cardiology consulted - Dr. Kimber Alvarez, appreciate their input     -LHC/RHC completed today    - Findings include non-obstructive coronary artery disease, non-ischemic cardiomyopathy,     Mild pulmonary hypertension   - Plan to restart Eliquis BID   - Start aspirin 81 mg daily and atorvastatin 40 mg daily.   - Increase metoprolol succinate to 100 mg BID    - Continue lisinopril 5 mg daily.   - Transition from IV to PO lasix 20 mg daily. 3. SHAY POA  - Resolved with medical therapy   - Etiology unknown. Likely secondary to dehydration, sepsis  - Nephrology following and assisting with management  - Cr of 1.1 from 1.7 on admission - increase secondary to diuresis with IV lasix   - Will continue to monitor     4. Electrolyte disturbance   - Secondary to diuresis   - Nephrology following and assisting with management  - Cardiology would like K > 4, Mg > 2  - Will continue to monitor     5. A. Fib with RVR   - Eliquis on hold today for cardiac cath   - Per cardiology coreg has been changed to metoprolol for better rate control, see above    6. Hyperglycemia  - A1C of 5.7%   - Will continue to monitor       DVT Prophylaxis: Eliquis   Diet: Diet NPO  Code Status: Full Code    PT/OT Eval Status: 24 hour supervision or assist, home with home health PT/OT     Dispo - likely in 1-2 days pending clinical improvement     Arlene Allen DO     Addendum to Resident  Progress note:  Pt seen,examined and evaluated with resident and discussed regarding POC .  I have reviewed the current history, physical findings, labs and assessment and plan and agree with note as documented by resident DO ( Dr. Fabiana Mccallum)     Patient with adequate urine output after addition of IV Lasix 20 mg twice daily. Evaluated by cardiology with plans for LHC this morning. LHC showed nonischemic cardiomyopathy and nonobstructive coronary artery disease. Cardiology advised to continue IV Lasix with plan to transition to p.o. Lasix tomorrow.   We will get PT OT evaluation, will follow, continue rest of the care    Kacey Arango MD  Hospitalist Physician

## 2021-11-05 NOTE — CARE COORDINATION
Patient taken out of C-DIFF isolation. No diarrhea for last 72 hours per MD progress notes. Acute CHF. Was getting IV lasix. Cardiology consulted. Transition from IV to PO lasix BID. Per nephrology hypomagnesemia. IV mag. Patient from home with spouse. Agreeable to a referral to [facility] yesterday. [facility] following.

## 2021-11-05 NOTE — PRE SEDATION
Brief Pre-Op Note/Sedation Assessment      Sonja Mccollum  1940  [id number]  12:29 PM    Planned Procedure: Cardiac Catheterization Procedure  Post Procedure Plan: Return to same level of care  Consent: I have discussed with the patient and/or the patient representative the indication, alternatives, and the possible risks and/or complications of the planned procedure and the anesthesia methods. The patient and/or patient representative appear to understand and agree to proceed. Chief Complaint:   Dyspnea, acute biventricular systolic heart failure, cardiomyopathy    Indications for Cath Procedure:  1. Presentation:  Cardiac Arrythmia, Cardiomyopathy and LV Dysfunction  2. Anginal Classification within 2 weeks:  No symptoms  3. Angina Symptoms Assessment:  Asymptomatic  4. Heart Failure Class within last 2 weeks:  Yes:  Heart Failure Type: Systolic Severity:  Class IV - Symptoms of HF at rest  5. Cardiovascular Instability:  No    Prior Ischemic Workup/Eval:  1. Pre-Procedural Medications: Yes: ACE/ARB/ARNI, Aspirin and Beta Blockers  2. Stress Test Completed? No    Does Patient need surgery? Cath Valve Surgery:  No    Pre-Procedure Medical History:  Vital Signs:  BP (!) 156/91   Pulse 113   Temp 98 °F (36.7 °C) (Oral)   Resp 18   Ht 5' 6\" (1.676 m)   Wt 165 lb 2 oz (74.9 kg)   SpO2 95%   BMI 26.65 kg/m²     Allergies:     Allergies   Allergen Reactions    Shellfish-Derived Products Shortness Of Breath and Hives     Medications:    Current Facility-Administered Medications   Medication Dose Route Frequency Provider Last Rate Last Admin    magnesium sulfate 2000 mg in 50 mL IVPB premix  2,000 mg IntraVENous Once Giulia Sinclair MD 25 mL/hr at 11/05/21 1034 2,000 mg at 11/05/21 1034    metoprolol succinate (TOPROL XL) extended release tablet 100 mg  100 mg Oral BID Oli Haddox, DO        metoprolol succinate (TOPROL XL) extended release tablet 50 mg  50 mg Oral Once Oli Billdox, DO  [START ON 11/6/2021] apixaban (ELIQUIS) tablet 5 mg  5 mg Oral BID Oli Haddox, DO        lisinopril (PRINIVIL;ZESTRIL) tablet 5 mg  5 mg Oral Daily Doyal Hakim, DO   5 mg at 11/05/21 0948    furosemide (LASIX) injection 20 mg  20 mg IntraVENous BID Oli Haddox, DO   20 mg at 11/05/21 0948    perflutren lipid microspheres (DEFINITY) injection 1.65 mg  1.5 mL IntraVENous ONCE PRN Christiano Herrera MD        cefTRIAXone (ROCEPHIN) 2000 mg IVPB in D5W 50ml minibag  2,000 mg IntraVENous Q24H Christiano Herrera MD   Stopped at 11/04/21 1457    calcium carbonate (TUMS) chewable tablet 500 mg  500 mg Oral TID PRN Domicheline Hakim, DO   500 mg at 11/01/21 1745    sodium chloride flush 0.9 % injection 10 mL  10 mL IntraVENous 2 times per day Larissa Stall A Caro, DO   10 mL at 11/05/21 0949    sodium chloride flush 0.9 % injection 10 mL  10 mL IntraVENous PRN Ahmad A Caro, DO   10 mL at 11/04/21 1309    0.9 % sodium chloride infusion  25 mL IntraVENous PRN Ahmad A Caro,  mL/hr at 11/02/21 1244 25 mL at 11/02/21 1244    magnesium sulfate 2000 mg in 50 mL IVPB premix  2,000 mg IntraVENous PRN Pervis Marily Caro, DO        acetaminophen (TYLENOL) tablet 650 mg  650 mg Oral Q6H PRN Pervis Marily Caro, DO   650 mg at 11/05/21 0902    Or    acetaminophen (TYLENOL) suppository 650 mg  650 mg Rectal Q6H PRN Ahmad A Caro, DO        glucose (GLUTOSE) 40 % oral gel 15 g  15 g Oral PRN Ahmad A Caro, DO        dextrose 50 % IV solution  12.5 g IntraVENous PRN Larissa Stall A Caro, DO        glucagon (rDNA) injection 1 mg  1 mg IntraMUSCular PRN Ahmad A Caro, DO        dextrose 5 % solution  100 mL/hr IntraVENous PRN Ahmad A Caro, DO        levothyroxine (SYNTHROID) tablet 100 mcg  100 mcg Oral Daily Tommie Elizondo DO   100 mcg at 11/05/21 0554    sodium chloride flush 0.9 % injection 5-40 mL  5-40 mL IntraVENous 2 times per day Samreen Pruett MD   10 mL at 11/05/21 0949    sodium chloride flush 0.9 % injection 5-40 mL  5-40 mL IntraVENous PRN Melania Jiménez MD   10 mL at 11/04/21 1822    0.9 % sodium chloride infusion  25 mL IntraVENous PRN Melania Jiménez MD        ondansetron [facility]) injection 4 mg  4 mg IntraVENous Q1H PRN Melania Jiménez MD   4 mg at 11/04/21 1309       Past Medical History:    Past Medical History:   Diagnosis Date    Hypertension     Thyroid disease     hypothyroidism       Surgical History:    Past Surgical History:   Procedure Laterality Date    HEMORRHOID SURGERY      HYSTERECTOMY      KNEE ARTHROSCOPY Right 3/10/2016    TONSILLECTOMY               Pre-Sedation:  Pre-Sedation Documentation and Exam:  I have personally completed a history, physical exam & review of systems for this patient (see notes). Prior History of Anesthesia Complications:   none    Modified Mallampati:  II (soft palate, uvula, fauces visible)    ASA Classification:  Class 3 - A patient with severe systemic disease that limits activity but is not incapacitating    Layla Scale: Activity:  2 - Able to move 4 extremities voluntarily on command  Respiration:  2 - Able to breathe deeply and cough freely  Circulation:  2 - BP+/- 20mmHg of normal  Consciousness:  2 - Fully awake  Oxygen Saturation (color):  2 - Able to maintain oxygen saturation >92% on room air    Sedation/Anesthesia Plan:  Guard the patient's safety and welfare. Minimize physical discomfort and pain. Minimize negative psychological responses to treatment by providing sedation and analgesia and maximize the potential amnesia. Patient to meet pre-procedure discharge plan.     Medication Planned:  midazolam intravenously and fentanyl intravenously    Patient is an appropriate candidate for plan of sedation:   yes      Electronically signed by Christy Garcia DO on 11/5/2021 at 12:29 PM

## 2021-11-05 NOTE — PROGRESS NOTES
Nephrology Progress  Note                                                                                                                                                                                                                                                                                                                                                               Office : [phone]     Fax :[phone]              Patient's Name: Miracle Brumfield  2:23 PM  11/5/2021    Reason for Consult:  Hyponatremia,  Requesting Physician:  Jessica Toscano MD      Chief Complaint:  Lanice Shark, diarrhea      Interval History :     S/p LHC on 11/5 : s/o elevated right side pressure , acute CHF        No diarrhea  Sitting in chair , not in acute distress  No vomiting        History of Present Ilness:    Miracle Brumfield is a 79 y.o. female with PMH of HTN , hypothyroidism ,Afib     Presented to ED with c/o nausea  Vomiting and diarrhea   Onset last 4-5 days  C/o weakness    Nephrology consult for hyponatremia management      Past Medical History:   Diagnosis Date    Hypertension     Thyroid disease     hypothyroidism       Past Surgical History:   Procedure Laterality Date    HEMORRHOID SURGERY      HYSTERECTOMY      KNEE ARTHROSCOPY Right 3/10/2016    TONSILLECTOMY         History reviewed. No pertinent family history. reports that she quit smoking about 41 years ago. She has never used smokeless tobacco. She reports current alcohol use of about 12.0 - 14.0 standard drinks of alcohol per week. She reports that she does not use drugs.     Allergies:  Shellfish-derived products    Current Medications:    metoprolol succinate (TOPROL XL) extended release tablet 100 mg, BID  metoprolol succinate (TOPROL XL) extended release tablet 50 mg, Once  [START ON 11/6/2021] apixaban (ELIQUIS) tablet 5 mg, BID  [START ON 11/6/2021] furosemide (LASIX) tablet 20 mg, Daily  [START ON 11/6/2021] aspirin chewable tablet 81 mg, Daily  atorvastatin (LIPITOR) tablet 40 mg, Nightly  lisinopril (PRINIVIL;ZESTRIL) tablet 5 mg, Daily  perflutren lipid microspheres (DEFINITY) injection 1.65 mg, ONCE PRN  cefTRIAXone (ROCEPHIN) 2000 mg IVPB in D5W 50ml minibag, Q24H  calcium carbonate (TUMS) chewable tablet 500 mg, TID PRN  sodium chloride flush 0.9 % injection 10 mL, 2 times per day  sodium chloride flush 0.9 % injection 10 mL, PRN  0.9 % sodium chloride infusion, PRN  magnesium sulfate 2000 mg in 50 mL IVPB premix, PRN  acetaminophen (TYLENOL) tablet 650 mg, Q6H PRN   Or  acetaminophen (TYLENOL) suppository 650 mg, Q6H PRN  glucose (GLUTOSE) 40 % oral gel 15 g, PRN  dextrose 50 % IV solution, PRN  glucagon (rDNA) injection 1 mg, PRN  dextrose 5 % solution, PRN  levothyroxine (SYNTHROID) tablet 100 mcg, Daily  sodium chloride flush 0.9 % injection 5-40 mL, 2 times per day  sodium chloride flush 0.9 % injection 5-40 mL, PRN  0.9 % sodium chloride infusion, PRN  ondansetron (ZOFRAN) injection 4 mg, Q1H PRN        Review of Systems:   14 point ROS obtained but were negative except mentioned in HPI      Physical exam:     Vitals:  BP (!) 148/85   Pulse 110   Temp 98.1 °F (36.7 °C) (Oral)   Resp 18   Ht 5' 6\" (1.676 m)   Wt 165 lb 2 oz (74.9 kg)   SpO2 96%   BMI 26.65 kg/m²   Constitutional:  OAA X3 NAD  Skin: no rash, turgor wnl  Heent:  eomi, mmm  Neck: no bruits or jvd noted  Cardiovascular:  S1, S2 without m/r/g  Respiratory: CTA B without w/r/r  Abdomen:  +bs, soft, nt, nd  Ext: no  lower extremity edema  Psychiatric: mood and affect appropriate  Musculoskeletal:  Rom, muscular strength intact    Data:   Labs:  CBC:   Recent Labs     11/03/21  0517 11/04/21  0526 11/05/21  0439   WBC 19.0* 18.8* 19.5*   HGB 11.3* 12.3 12.2    235 306     BMP:    Recent Labs     11/03/21  0517 11/04/21  0526 11/05/21  0439   * 137 135*   K 3.4* 3.7 3.3*   CL 98* 101 98*   CO2 24 26 27   BUN 23* 22* 22*   CREATININE 1.0 0.9 1.1   GLUCOSE 136* 117* 130*     Ca/Mg/Phos:   Recent Labs     11/03/21  0517 11/04/21  0526 11/05/21  0439   CALCIUM 8.4 8.9 8.8   MG 2.20  --  1.70*     Hepatic:   No results for input(s): AST, ALT, ALB, BILITOT, ALKPHOS in the last 72 hours. Troponin:   No results for input(s): TROPONINI in the last 72 hours. BNP: No results for input(s): BNP in the last 72 hours. Lipids: No results for input(s): CHOL, TRIG, HDL, LDLCALC, LABVLDL in the last 72 hours. ABGs: No results for input(s): PHART, PO2ART, BLJ9NTB in the last 72 hours. INR: No results for input(s): INR in the last 72 hours. UA:  No results for input(s): [address], GLUCOSEU, BILIRUBINUR, KETUA, SPECGRAV, BLOODU, PHUR, PROTEINU, UROBILINOGEN, NITRU, LEUKOCYTESUR, [place] in the last 72 hours. Urine Microscopic:   No results for input(s): LABCAST, BACTERIA, COMU, HYALCAST, WBCUA, RBCUA, EPIU in the last 72 hours. Urine Culture:   No results for input(s): LABURIN in the last 72 hours. Urine Chemistry:   No results for input(s): Jennie Quitter, PROTEINUR, NAUR in the last 72 hours. IMAGING:  CT ABDOMEN PELVIS WO CONTRAST Additional Contrast? None   Final Result   1. No acute finding in the abdomen or pelvis. 2. Cholelithiasis and diverticulosis noted incidentally. 3. Trace pleural effusions in the lower chest with peripheral airspace   opacities. Correlate with any pulmonary symptoms. A developing viral   infection or pulmonary edema may be considered. XR CHEST PORTABLE   Final Result   Mild cardiomegaly and COPD. No other cardiopulmonary disease. Assessment/Plan   1. Hyponatremia      Serum Na 122  -----> 126    Etiology could be hypovolemic hyponatremia due to vomiting , diarrhea   Poor solute intake VS SIADH    Volume status : hypovolemic side    Urine Na < 20     Plan    Serum Na improved    DC salt tablet    Encourage high protein/solute diet    Moniter serum Na every 12 h      2.   NAGMA 2/2 diarrhea    improved    3.  CKD stage 3    mallorie    Serum cr stable    Avoid NSAIDs      4 N/V/D    improved      5 Hypokalemia    Replace with KCL prn      6 Acute CHF    Cont lasix and ACEI    7 Hypomagnesemia    Replace with IV Mg                Thank you for allowing us to participate in care of Chantelle Sandhu MD  Feel free to contact me   [facility] [address]  Office : [phone]  Fax :[phone]

## 2021-11-06 LAB
ANION GAP SERPL CALCULATED.3IONS-SCNC: 11 MMOL/L (ref 3–16)
ANISOCYTOSIS: ABNORMAL
BANDED NEUTROPHILS RELATIVE PERCENT: 12 % (ref 0–7)
BASOPHILS ABSOLUTE: 0 K/UL (ref 0–0.2)
BASOPHILS RELATIVE PERCENT: 0 %
BUN BLDV-MCNC: 19 MG/DL (ref 7–20)
CALCIUM SERPL-MCNC: 8.9 MG/DL (ref 8.3–10.6)
CHLORIDE BLD-SCNC: 99 MMOL/L (ref 99–110)
CO2: 30 MMOL/L (ref 21–32)
CREAT SERPL-MCNC: 0.9 MG/DL (ref 0.6–1.2)
EOSINOPHILS ABSOLUTE: 0.4 K/UL (ref 0–0.6)
EOSINOPHILS RELATIVE PERCENT: 2 %
GFR AFRICAN AMERICAN: >60
GFR NON-AFRICAN AMERICAN: >60
GLUCOSE BLD-MCNC: 122 MG/DL (ref 70–99)
HCT VFR BLD CALC: 37.9 % (ref 36–48)
HEMOGLOBIN: 12.8 G/DL (ref 12–16)
LYMPHOCYTES ABSOLUTE: 4.8 K/UL (ref 1–5.1)
LYMPHOCYTES RELATIVE PERCENT: 27 %
MACROCYTES: ABNORMAL
MAGNESIUM: 2.2 MG/DL (ref 1.8–2.4)
MCH RBC QN AUTO: 29.7 PG (ref 26–34)
MCHC RBC AUTO-ENTMCNC: 33.7 G/DL (ref 31–36)
MCV RBC AUTO: 88.1 FL (ref 80–100)
METAMYELOCYTES RELATIVE PERCENT: 3 %
MONOCYTES ABSOLUTE: 1.4 K/UL (ref 0–1.3)
MONOCYTES RELATIVE PERCENT: 8 %
MYELOCYTE PERCENT: 2 %
NEUTROPHILS ABSOLUTE: 11.2 K/UL (ref 1.7–7.7)
NEUTROPHILS RELATIVE PERCENT: 46 %
PDW BLD-RTO: 14 % (ref 12.4–15.4)
PLATELET # BLD: 368 K/UL (ref 135–450)
PMV BLD AUTO: 7.8 FL (ref 5–10.5)
POTASSIUM SERPL-SCNC: 4.1 MMOL/L (ref 3.5–5.1)
RBC # BLD: 4.3 M/UL (ref 4–5.2)
SODIUM BLD-SCNC: 140 MMOL/L (ref 136–145)
TOXIC GRANULATION: PRESENT
WBC # BLD: 17.8 K/UL (ref 4–11)

## 2021-11-06 PROCEDURE — 2060000000 HC ICU INTERMEDIATE R&B

## 2021-11-06 PROCEDURE — 99232 SBSQ HOSP IP/OBS MODERATE 35: CPT | Performed by: HOSPITALIST

## 2021-11-06 PROCEDURE — 36415 COLL VENOUS BLD VENIPUNCTURE: CPT

## 2021-11-06 PROCEDURE — 99233 SBSQ HOSP IP/OBS HIGH 50: CPT | Performed by: NURSE PRACTITIONER

## 2021-11-06 PROCEDURE — 6370000000 HC RX 637 (ALT 250 FOR IP): Performed by: INTERNAL MEDICINE

## 2021-11-06 PROCEDURE — 2580000003 HC RX 258: Performed by: INTERNAL MEDICINE

## 2021-11-06 PROCEDURE — 85025 COMPLETE CBC W/AUTO DIFF WBC: CPT

## 2021-11-06 PROCEDURE — 6360000002 HC RX W HCPCS: Performed by: INTERNAL MEDICINE

## 2021-11-06 PROCEDURE — 83735 ASSAY OF MAGNESIUM: CPT

## 2021-11-06 PROCEDURE — 6370000000 HC RX 637 (ALT 250 FOR IP)

## 2021-11-06 PROCEDURE — 80048 BASIC METABOLIC PNL TOTAL CA: CPT

## 2021-11-06 RX ADMIN — METOPROLOL SUCCINATE 100 MG: 50 TABLET, EXTENDED RELEASE ORAL at 21:55

## 2021-11-06 RX ADMIN — CEFTRIAXONE SODIUM 2000 MG: 2 INJECTION, POWDER, FOR SOLUTION INTRAMUSCULAR; INTRAVENOUS at 13:49

## 2021-11-06 RX ADMIN — ATORVASTATIN CALCIUM 40 MG: 40 TABLET, FILM COATED ORAL at 21:55

## 2021-11-06 RX ADMIN — SODIUM CHLORIDE, PRESERVATIVE FREE 10 ML: 5 INJECTION INTRAVENOUS at 08:23

## 2021-11-06 RX ADMIN — LISINOPRIL 5 MG: 5 TABLET ORAL at 08:22

## 2021-11-06 RX ADMIN — ASPIRIN 81 MG: 81 TABLET, CHEWABLE ORAL at 08:22

## 2021-11-06 RX ADMIN — SODIUM CHLORIDE, PRESERVATIVE FREE 10 ML: 5 INJECTION INTRAVENOUS at 21:55

## 2021-11-06 RX ADMIN — APIXABAN 5 MG: 5 TABLET, FILM COATED ORAL at 21:55

## 2021-11-06 RX ADMIN — APIXABAN 5 MG: 5 TABLET, FILM COATED ORAL at 08:22

## 2021-11-06 RX ADMIN — FUROSEMIDE 20 MG: 20 TABLET ORAL at 08:22

## 2021-11-06 RX ADMIN — LEVOTHYROXINE SODIUM 100 MCG: 0.1 TABLET ORAL at 06:43

## 2021-11-06 RX ADMIN — ANTACID TABLETS 500 MG: 500 TABLET, CHEWABLE ORAL at 22:58

## 2021-11-06 RX ADMIN — METOPROLOL SUCCINATE 100 MG: 50 TABLET, EXTENDED RELEASE ORAL at 08:22

## 2021-11-06 ASSESSMENT — PAIN SCALES - GENERAL
PAINLEVEL_OUTOF10: 0

## 2021-11-06 NOTE — PROGRESS NOTES
Nephrology Progress  Note                                                                                                                                                                                                                                                                                                                                                               Office : [phone]     Fax :[phone]              Patient's Name: Sanjay Clay  2:53 PM  11/6/2021    Reason for Consult:  Hyponatremia,  Requesting Physician:  Denisa Jones MD      Chief Complaint:  Chin Nose, diarrhea      Interval History : On lasix   Not in acute distress  Renal fn stable    S/p LHC on 11/5 : s/o elevated right side pressure , acute CHF        No diarrhea  Sitting in chair , not in acute distress  No vomiting        History of Present Ilness:    Sanjay Clay is a 79 y.o. female with PMH of HTN , hypothyroidism ,Afib     Presented to ED with c/o nausea  Vomiting and diarrhea   Onset last 4-5 days  C/o weakness    Nephrology consult for hyponatremia management      Past Medical History:   Diagnosis Date    Hypertension     Thyroid disease     hypothyroidism       Past Surgical History:   Procedure Laterality Date    HEMORRHOID SURGERY      HYSTERECTOMY      KNEE ARTHROSCOPY Right 3/10/2016    TONSILLECTOMY         History reviewed. No pertinent family history. reports that she quit smoking about 41 years ago. She has never used smokeless tobacco. She reports current alcohol use of about 12.0 - 14.0 standard drinks of alcohol per week. She reports that she does not use drugs.     Allergies:  Shellfish-derived products    Current Medications:    metoprolol succinate (TOPROL XL) extended release tablet 100 mg, BID  apixaban (ELIQUIS) tablet 5 mg, BID  furosemide (LASIX) tablet 20 mg, Daily  aspirin chewable tablet 81 mg, Daily  sodium chloride flush 0.9 % injection 5-40 mL, 2 times per day  sodium chloride flush 0.9 % injection 5-40 mL, PRN  0.9 % sodium chloride infusion, PRN  atorvastatin (LIPITOR) tablet 40 mg, Nightly  lisinopril (PRINIVIL;ZESTRIL) tablet 5 mg, Daily  perflutren lipid microspheres (DEFINITY) injection 1.65 mg, ONCE PRN  cefTRIAXone (ROCEPHIN) 2000 mg IVPB in D5W 50ml minibag, Q24H  calcium carbonate (TUMS) chewable tablet 500 mg, TID PRN  sodium chloride flush 0.9 % injection 10 mL, 2 times per day  sodium chloride flush 0.9 % injection 10 mL, PRN  0.9 % sodium chloride infusion, PRN  magnesium sulfate 2000 mg in 50 mL IVPB premix, PRN  acetaminophen (TYLENOL) tablet 650 mg, Q6H PRN   Or  acetaminophen (TYLENOL) suppository 650 mg, Q6H PRN  glucose (GLUTOSE) 40 % oral gel 15 g, PRN  dextrose 50 % IV solution, PRN  glucagon (rDNA) injection 1 mg, PRN  dextrose 5 % solution, PRN  levothyroxine (SYNTHROID) tablet 100 mcg, Daily  sodium chloride flush 0.9 % injection 5-40 mL, 2 times per day  sodium chloride flush 0.9 % injection 5-40 mL, PRN  0.9 % sodium chloride infusion, PRN  ondansetron (ZOFRAN) injection 4 mg, Q1H PRN        Review of Systems:   14 point ROS obtained but were negative except mentioned in HPI      Physical exam:     Vitals:  BP (!) 145/85   Pulse 110   Temp 97.6 °F (36.4 °C) (Oral)   Resp 20   Ht 5' 6\" (1.676 m)   Wt 155 lb 8 oz (70.5 kg)   SpO2 93%   BMI 25.10 kg/m²   Constitutional:  OAA X3 NAD  Skin: no rash, turgor wnl  Heent:  eomi, mmm  Neck: no bruits or jvd noted  Cardiovascular:  S1, S2 without m/r/g  Respiratory: CTA B without w/r/r  Abdomen:  +bs, soft, nt, nd  Ext: no  lower extremity edema  Psychiatric: mood and affect appropriate  Musculoskeletal:  Rom, muscular strength intact    Data:   Labs:  CBC:   Recent Labs     11/04/21  0526 11/05/21  0439 11/06/21  0451   WBC 18.8* 19.5* 17.8*   HGB 12.3 12.2 12.8    306 368     BMP:    Recent Labs     11/04/21  0526 11/05/21 0439 11/06/21 0451    135* 140   K 3.7 3.3* 4.1    98* 99 CO2 26 27 30   BUN 22* 22* 19   CREATININE 0.9 1.1 0.9   GLUCOSE 117* 130* 122*     Ca/Mg/Phos:   Recent Labs     11/04/21  0526 11/05/21  0439 11/06/21  0451   CALCIUM 8.9 8.8 8.9   MG  --  1.70* 2.20     Hepatic:   No results for input(s): AST, ALT, ALB, BILITOT, ALKPHOS in the last 72 hours. Troponin:   No results for input(s): TROPONINI in the last 72 hours. BNP: No results for input(s): BNP in the last 72 hours. Lipids: No results for input(s): CHOL, TRIG, HDL, LDLCALC, LABVLDL in the last 72 hours. ABGs: No results for input(s): PHART, PO2ART, LJR4QPY in the last 72 hours. INR: No results for input(s): INR in the last 72 hours. UA:  No results for input(s): Lorelie [place], GLUCOSEU, BILIRUBINUR, KETUA, SPECGRAV, BLOODU, PHUR, PROTEINU, UROBILINOGEN, NITRU, LEUKOCYTESUR, Terrea Billet in the last 72 hours. Urine Microscopic:   No results for input(s): LABCAST, BACTERIA, COMU, HYALCAST, WBCUA, RBCUA, EPIU in the last 72 hours. Urine Culture:   No results for input(s): LABURIN in the last 72 hours. Urine Chemistry:   No results for input(s): Holli Sandman, PROTEINUR, NAUR in the last 72 hours. IMAGING:  CT ABDOMEN PELVIS WO CONTRAST Additional Contrast? None   Final Result   1. No acute finding in the abdomen or pelvis. 2. Cholelithiasis and diverticulosis noted incidentally. 3. Trace pleural effusions in the lower chest with peripheral airspace   opacities. Correlate with any pulmonary symptoms. A developing viral   infection or pulmonary edema may be considered. XR CHEST PORTABLE   Final Result   Mild cardiomegaly and COPD. No other cardiopulmonary disease. Assessment/Plan   1.  Hyponatremia      Serum Na 122  -----> 126    Etiology could be hypovolemic hyponatremia due to vomiting , diarrhea   Poor solute intake VS SIADH    Volume status : hypovolemic side    Urine Na < 20     Plan  Cont lasix    Serum Na improved    DC salt tablet    Encourage high protein/solute diet    Moniter serum Na every 12 h      2. NAGMA 2/2 diarrhea    improved    3.  CKD stage 3    [facility]    Serum cr stable    Avoid NSAIDs      4 N/V/D    improved      5 Hypokalemia    Replace with KCL prn      6 Acute CHF    Cont lasix and ACEI    7 Hypomagnesemia    Replace with IV Mg                Thank you for allowing us to participate in care of Chantelle Sandhu MD  Feel free to contact me   [facility] [address]  Office : [phone]  Fax :[phone]

## 2021-11-06 NOTE — FLOWSHEET NOTE
11/06/21 0814   Vital Signs   Temp 97.6 °F (36.4 °C)   Temp Source Oral   Pulse 116   Heart Rate Source Monitor   Resp 18   BP (!) 153/76   BP Location Left upper arm   MAP (mmHg) 101   Patient Position Sitting   Level of Consciousness Alert (0)   MEWS Score 3   Patient Currently in Pain Denies   Pain Assessment   Pain Assessment 0-10   Pain Level 0   Oxygen Therapy   SpO2 94 %   O2 Device None (Room air)

## 2021-11-06 NOTE — PROGRESS NOTES
 sodium chloride         Lab Data:  CBC:   Recent Labs     11/04/21  0526 11/05/21  0439 11/06/21  0451   WBC 18.8* 19.5* 17.8*   HGB 12.3 12.2 12.8    306 368     BMP:    Recent Labs     11/04/21  0526 11/05/21  0439 11/06/21  0451    135* 140   K 3.7 3.3* 4.1   CO2 26 27 30   BUN 22* 22* 19   CREATININE 0.9 1.1 0.9     INR:  No results for input(s): INR in the last 72 hours. BNP:  No results for input(s): PROBNP in the last 72 hours. No results found for: LVEF, LVEFMODE    Testing:    TTE 3/2/05:  RESULTS:    1. Technically adequate study. 2.  Left ventricular function is abnormal.  There are septal and       lateral hypokinesis. Estimated ejection fraction is 40% to 45%. 3. Monahan Moles is no chamber enlargement. Monahan Moles is no ventricular       hypertrophy.  The right sided chambers are not enlarged. There       is mild left atrial enlargement. 4.  The aortic, mitral, tricuspid and pulmonary valves function       adequately. Monahan Moles is no evidence of valvular stenosis or       prolapse. 5.  No thrombus, mass or effusions were seen. 6.  Doppler interrogation revealed normal antegrade velocities.  The       mean gradient across the aortic valve is 4.6 mm Hg. Color Doppler       demonstrated mild mitral and tricuspid regurgitation.  Right       ventricular systolic pressure was 25 mm Hg.       TTE 6/23/20:  Study Conclusions   - Left ventricle: The cavity size is normal. There is mild focal     basal hypertrophy. There is hypertrophy of the septum. Systolic     function was normal. The estimated ejection fraction was in the   Filiberto of 52% to 57%. Wall motion was normal; there were no     regional wall motion abnormalities. Features are consistent with     a pseudonormal left ventricular filling pattern, with concomitant     abnormal relaxation and increased filling pressure (grade 2     diastolic dysfunction). The global longitudinal strain was -22%. - Aortic valve:  Thickening, consistent with sclerosis. - Mitral valve: Leaflet separation was reduced. Mobility was     restricted. There was mild to moderate regurgitation.   - Left atrium: The atrium is markedly dilated. - Inferior vena cava: The vessel was normal in size; the     respirophasic diameter changes were in the normal range (&gt;= 50%);     findings are consistent with normal central venous pressure.      TTE 11/3/21:  Conclusions   Summary   Technically difficult examination.  Ruth Arch left ventricular function is moderately decreased with ejection   fraction estimated at 35-40%.   EF by Medina's method estimated at 35%.   Regional wall motion abnormalities cannot be ruled out due to irregular   heart rate.   Diastolic dysfunction grade and filing pressure are indeterminate.   The left atrium is severely dilated.   Right ventricular systolic function is reduced .   Aortic valve appears sclerotic but opens adequately.   Mitral annular calcification is present, more prominent on the posterior   mitral valve leaflet.   Mild to moderate (posteriorly eccentric) mitral regurgitation.   Mild pulmonic regurgitation.   Moderate tricuspid valve regurgitation.   Trace aortic valve regurgitation.   Systolic pulmonary artery pressure (SPAP) estimated at 44 mmHg (right atrial   pressure 3 mmHg), consistent with mild pulmonary hypertension.   Irregular heart rate throughout study.     [facility] 4/6/05:  FINDINGS:     CORONARY ARTERIOGRAPHY: Coronary arteriography of this   co-dominant system revealed no significant stenoses of the LMCA,   LAD, left circumflex or RCA. LEFT VENTRICULOGRAPHY: Left ventriculogram in the RANGEL projection   in the setting of ectopy revealed normal left ventricular   function with an estimated ejection fraction of greater then 50%   and trace mitral regurgitation. IMPRESSION: No significant coronary artery disease, probably   normal left ventricular function. PLAN:   Medical therapy.    We will arrange a MUGA to definitively assess LV function. Findings:  1. Hemodynamics:  A. Right heart catheterization                   1. RA: 8 mmHg                   2. RV: 42/8 mmHg                   3. PA: 40/24 (30) mmHg                   4. PCWP: 17 mmHg                   5. Saturations: AO 92%, RA 59%, PA 56%                   6. Danish CO: 4.48 L/min                   7. Danish CI: 2.43 L/min*m2                   8. Danish SVR: 1,625 dyne*sec/cm5                   9. Danish PVR: 232 dyne*sec/cm5              B. Opening arterial pressure: 134/76 (99) mmHg              C. LVEDP: 15 mmHg     2. Coronary anatomy:  A. Left main artery: The left main artery bifurcates into the left anterior descending artery and left circumflex artery. The left main artery has minor luminal irregularities. .  B. Left anterior descending artery: Transapical vessel which gives rise to 2 small diagonal arteries. The LAD has a 20% ostial stenosis, eccentric 40% proximal stenosis, and 30% mid-vessel stenosis. C. Left circumflex artery: Non-dominant vessel that gives rise to 3 obtuse marginal arteries and a posterolateral branch. The LCx has a 20% ostial stenosis. The remainder of the vessel is relatively free of disease. The OM1 is a very small vessel with a high origin. The OM2 is a large branching vessel with no angiographically significant disease. The left posterolateral branch has minor luminal irregularities. D. Right coronary artery: Dominant vessel. The RCA is medium caliber and has minor luminal irregularities. The right posterior descending artery has minor luminal irregularities. The right posterolateral branch has minor luminal irregularities. Technical Factors:  Complications: None.   Estimated blood loss: Minimal.  Radiation: Air kerma 274 mGy and 1.7 minutes of fluoroscopy  Sedation: Moderate conscious sedation was administered by qualified nursing personnel under continuous hemodynamic monitoring, starting at 11:59 AM and ending at 12:22 PM.  Medications: 2.5 mg IA verapamil, 1 mg IV Versed, 25 mcg IV Fentanyl, 5,000 units IV heparin  Contrast: 30 cc of Isovue      Impression:  1. Non-obstructive coronary artery disease. 2. Non-ischemic cardiomyopathy. 3. Acute biventricular systolic heart failure with mildly elevated left and right-sided cardiac filling pressures. 4. Mild pulmonary hypertension. 5. Preserved Danish cardiac output and index. 6. Persistent atrial fibrillation. Plan:  1. Restart Eliquis 5 mg BID tomorrow. 2. Start aspirin 81 mg daily and atorvastatin 40 mg daily. 3. Increase metoprolol succinate to 100 mg BID for improved ventricular rate control. 4. Continue lisinopril 5 mg daily. 5. Transition from IV to PO lasix 20 mg daily. 6. Monitor strict I/Os, obtain daily standing weights. 7. 2L per day fluid restriction, low sodium diet. 8. Maintain K>4 and Mg>2.      11/06/21 0444 155 lb 8 oz (70.5 kg) Standing scale     11/04/21 0445 165 lb 2 oz (74.9 kg) Bed scale     11/03/21 0620 161 lb 2.5 oz (73.1 kg) Bed scale     11/01/21 0600 155 lb 13.8 oz (70.7 kg) Bed scale     10/31/21 0434 158 lb 11.7 oz (72 kg) Bed scale     10/30/21 2009 150 lb (68 kg) Stated           Active Problems:    Sepsis ([facility] [address]    Cardiomyopathy ([facility] [address]    Acute systolic heart failure (HCC)    Dyspnea  Resolved Problems:    * No resolved hospital problems.  *    Assessment:  Acute biventricular systolic CHF  Heart failure with reduced EF 35-40%  Nonischemic cardiomyopathy  Nonobstructive CAD  Mild pulmonary hypertension  Persistent atrial fibrillation, intermittent RVR   Severely dilated left atrium  Mitral regurg  UTI/sepsis  SHAY-resolved  Hypoxemia      Plan:  Daily weights, daily BMP  Metoprolol 100 mg p.o. twice daily- already increased to this dose 11/5/21  Monitor HR after am medications, if remains uncontrolled then can consider adding digoxin 125mcg daily  Lasix 20 mg daily  Continue Eliquis 5 mg twice daily  Continue lisinopril 5 mg daily,

## 2021-11-06 NOTE — PROGRESS NOTES
Hospitalist Progress Note      PCP: Stephanie Valadez MD    Date of Admission: 10/30/2021    Chief Complaint: Nausea, fatigue and diarrhea     Subjective:   No acute events overnight. She states that she is more short of breath this morning. She denies chest pain, nausea, vomiting, diarrhea. Medications:  Reviewed    Infusion Medications    sodium chloride      sodium chloride 25 mL (11/02/21 1244)    dextrose      sodium chloride       Scheduled Medications    metoprolol succinate  100 mg Oral BID    apixaban  5 mg Oral BID    furosemide  20 mg Oral Daily    aspirin  81 mg Oral Daily    sodium chloride flush  5-40 mL IntraVENous 2 times per day    atorvastatin  40 mg Oral Nightly    lisinopril  5 mg Oral Daily    cefTRIAXone (ROCEPHIN) IV  2,000 mg IntraVENous Q24H    sodium chloride flush  10 mL IntraVENous 2 times per day    levothyroxine  100 mcg Oral Daily    sodium chloride flush  5-40 mL IntraVENous 2 times per day     PRN Meds: sodium chloride flush, sodium chloride, perflutren lipid microspheres, calcium carbonate, sodium chloride flush, sodium chloride, magnesium sulfate, acetaminophen **OR** acetaminophen, glucose, dextrose, glucagon (rDNA), dextrose, sodium chloride flush, sodium chloride, ondansetron      Intake/Output Summary (Last 24 hours) at 11/6/2021 1255  Last data filed at 11/6/2021 0827  Gross per 24 hour   Intake 120 ml   Output 300 ml   Net -180 ml       Physical Exam Performed:  BP (!) 145/85   Pulse 110   Temp 97.6 °F (36.4 °C) (Oral)   Resp 20   Ht 5' 6\" (1.676 m)   Wt 155 lb 8 oz (70.5 kg)   SpO2 93%   BMI 25.10 kg/m²     General appearance: No apparent distress, appears stated age and cooperative  HEENT: Conjunctivae/corneas clear. Neck: No jugular venous distention. Respiratory:  Normal respiratory effort. Clear to auscultation, bilaterally without Rales/Wheezes/Rhonchi.   Cardiovascular: Regular rate and rhythm with normal S1/S2 without murmurs, rubs or gallops. Abdomen: Soft, non-distended, non-tender  Musculoskeletal: No clubbing, cyanosis or edema bilaterally. Skin: Skin color, texture, turgor normal.  No rashes or lesions. Neurologic:  Neurovascularly intact without any focal sensory/motor deficits. Psychiatric: Alert and oriented, thought content appropriate, normal insight    Labs:   Recent Labs     11/04/21 0526 11/05/21 0439 11/06/21  0451   WBC 18.8* 19.5* 17.8*   HGB 12.3 12.2 12.8   HCT 36.7 36.4 37.9    306 368     Recent Labs     11/04/21 0526 11/05/21 0439 11/06/21  0451    135* 140   K 3.7 3.3* 4.1    98* 99   CO2 26 27 30   BUN 22* 22* 19   CREATININE 0.9 1.1 0.9   CALCIUM 8.9 8.8 8.9     Urinalysis:    Lab Results   Component Value Date    NITRU Negative 10/30/2021    WBCUA >100 10/30/2021    BACTERIA 3+ 10/30/2021    RBCUA see below 10/30/2021    BLOODU MODERATE 10/30/2021    SPECGRAV 1.020 10/30/2021    GLUCOSEU Negative 10/30/2021       Radiology:  CT ABDOMEN PELVIS WO CONTRAST Additional Contrast? None   Final Result   1. No acute finding in the abdomen or pelvis. 2. Cholelithiasis and diverticulosis noted incidentally. 3. Trace pleural effusions in the lower chest with peripheral airspace   opacities. Correlate with any pulmonary symptoms. A developing viral   infection or pulmonary edema may be considered. XR CHEST PORTABLE   Final Result   Mild cardiomegaly and COPD. No other cardiopulmonary disease. Active Hospital Problems    Diagnosis     Cardiomyopathy [facility]) [I42.9]     Acute systolic heart failure (HCC) [I50.21]     Dyspnea [R06.00]     Sepsis ([facility] [address] [A41.9]      Assessment/Plan:  1. Acute E. coli cystitis with Bacteremia POA  - WBC has decreased to 17.8, could also be reactive  - Symptomatically improving   - CXR showed no acute process  - Blood cultures x2 grew E.  Coli , pan sensitive with resistance to bactrim   - Urine culture showed E. coli   - Covid negative   - Vancomycin x1 on 10/30  - Discontinue Zosyn (3 days)   - Continue 2 g of ceftriaxone   - Repeat blood culture shows no growth to date     2. Acute Biventricular Systolic HF - newly diagnosed this admission  - Echo showed decreased EF to 35-40%, left atrium is severely dilated, compared to Echo [facility] at [facility] on 6/2020 which showed a normal EF of 52-57%   - BNP increased to 8372 from 7318 on admission   - EKG showed no evidence of ischemic changes  - Troponins negative on admission   - Strict I/Os, 2L fluid per day    - Net - 54.9 since admission   - Daily weights   - Cardiology consulted - Dr. Bill Jones, appreciate their input     - LHC/RHC completed today    - Findings include non-obstructive coronary artery disease, non-ischemic cardiomyopathy,     Mild pulmonary hypertension   - Heart rate elevated overnight, continue metoprolol, add digoxin 125 mcg if continues to be    uncontrolled    - Encouraged incentive spirometry use     3. SHAY POA  - Resolved with medical therapy   - Etiology unknown. Likely secondary to dehydration, sepsis  - Nephrology following and assisting with management  - Cr of 0.9 from 1.7 on admission - increase secondary to diuresis with IV lasix   - Will continue to monitor     4. Electrolyte disturbance   - Resolved with medical therapy   - Nephrology following and assisting with management  - Cardiology would like K > 4, Mg > 2  - Will continue to monitor     5. A. Fib with RVR   - Continue Eliquis   - Per cardiology coreg has been changed to metoprolol for better rate control, see above    6. Hyperglycemia W/o Diabetes   - A1C of 5.7%   - Will continue to monitor     DVT Prophylaxis: Eliquis   Diet: ADULT DIET;  Regular  ADULT ORAL NUTRITION SUPPLEMENT; Breakfast, Lunch, Dinner; Standard High Calorie/High Protein Oral Supplement  Code Status: Full Code    PT/OT Eval Status: 24 hour supervision or assist, home with home health PT/OT     Dispo - likely in 1-2 days pending clinical improvement Gladis Vieyra DO     Addendum to Resident  Progress note:  Pt seen,examined and evaluated with resident and discussed regarding POC . I have reviewed the current history, physical findings, labs and assessment and plan and agree with note as documented by resident DO ( Dr. Carmina Multani)      Patient with adequate urine output after addition of IV Lasix 20 mg twice daily. Evaluated by cardiology and S/p  LHC on 11/5/21 . LHC showed nonischemic cardiomyopathy and nonobstructive coronary artery disease. Cardiology advised  to transition to p.o. Lasix today.    will follow, continue rest of the care     Khoi Starks MD  Hospitalist Physician

## 2021-11-07 LAB
ANION GAP SERPL CALCULATED.3IONS-SCNC: 9 MMOL/L (ref 3–16)
ANISOCYTOSIS: ABNORMAL
BANDED NEUTROPHILS RELATIVE PERCENT: 2 % (ref 0–7)
BASOPHILS ABSOLUTE: 0 K/UL (ref 0–0.2)
BASOPHILS RELATIVE PERCENT: 0 %
BUN BLDV-MCNC: 15 MG/DL (ref 7–20)
CALCIUM SERPL-MCNC: 8.6 MG/DL (ref 8.3–10.6)
CHLORIDE BLD-SCNC: 97 MMOL/L (ref 99–110)
CO2: 29 MMOL/L (ref 21–32)
CREAT SERPL-MCNC: 0.8 MG/DL (ref 0.6–1.2)
CULTURE, BLOOD 2: NORMAL
EOSINOPHILS ABSOLUTE: 0.3 K/UL (ref 0–0.6)
EOSINOPHILS RELATIVE PERCENT: 2 %
GFR AFRICAN AMERICAN: >60
GFR NON-AFRICAN AMERICAN: >60
GLUCOSE BLD-MCNC: 128 MG/DL (ref 70–99)
HCT VFR BLD CALC: 33.6 % (ref 36–48)
HEMOGLOBIN: 11.4 G/DL (ref 12–16)
LYMPHOCYTES ABSOLUTE: 3.7 K/UL (ref 1–5.1)
LYMPHOCYTES RELATIVE PERCENT: 21 %
MACROCYTES: ABNORMAL
MAGNESIUM: 1.8 MG/DL (ref 1.8–2.4)
MCH RBC QN AUTO: 29.4 PG (ref 26–34)
MCHC RBC AUTO-ENTMCNC: 34 G/DL (ref 31–36)
MCV RBC AUTO: 86.6 FL (ref 80–100)
MONOCYTES ABSOLUTE: 1.7 K/UL (ref 0–1.3)
MONOCYTES RELATIVE PERCENT: 10 %
NEUTROPHILS ABSOLUTE: 11.7 K/UL (ref 1.7–7.7)
NEUTROPHILS RELATIVE PERCENT: 65 %
PDW BLD-RTO: 13.8 % (ref 12.4–15.4)
PLATELET # BLD: 374 K/UL (ref 135–450)
PMV BLD AUTO: 7.4 FL (ref 5–10.5)
POLYCHROMASIA: ABNORMAL
POTASSIUM SERPL-SCNC: 3.3 MMOL/L (ref 3.5–5.1)
PRO-BNP: 5008 PG/ML (ref 0–449)
RBC # BLD: 3.88 M/UL (ref 4–5.2)
SODIUM BLD-SCNC: 135 MMOL/L (ref 136–145)
TOXIC GRANULATION: PRESENT
WBC # BLD: 17.4 K/UL (ref 4–11)

## 2021-11-07 PROCEDURE — 6370000000 HC RX 637 (ALT 250 FOR IP)

## 2021-11-07 PROCEDURE — 6370000000 HC RX 637 (ALT 250 FOR IP): Performed by: STUDENT IN AN ORGANIZED HEALTH CARE EDUCATION/TRAINING PROGRAM

## 2021-11-07 PROCEDURE — 6370000000 HC RX 637 (ALT 250 FOR IP): Performed by: INTERNAL MEDICINE

## 2021-11-07 PROCEDURE — 36415 COLL VENOUS BLD VENIPUNCTURE: CPT

## 2021-11-07 PROCEDURE — 99233 SBSQ HOSP IP/OBS HIGH 50: CPT | Performed by: INTERNAL MEDICINE

## 2021-11-07 PROCEDURE — 2580000003 HC RX 258: Performed by: INTERNAL MEDICINE

## 2021-11-07 PROCEDURE — 2060000000 HC ICU INTERMEDIATE R&B

## 2021-11-07 PROCEDURE — 6370000000 HC RX 637 (ALT 250 FOR IP): Performed by: NURSE PRACTITIONER

## 2021-11-07 PROCEDURE — 6360000002 HC RX W HCPCS: Performed by: INTERNAL MEDICINE

## 2021-11-07 PROCEDURE — 99232 SBSQ HOSP IP/OBS MODERATE 35: CPT | Performed by: HOSPITALIST

## 2021-11-07 PROCEDURE — 80048 BASIC METABOLIC PNL TOTAL CA: CPT

## 2021-11-07 PROCEDURE — 85025 COMPLETE CBC W/AUTO DIFF WBC: CPT

## 2021-11-07 PROCEDURE — 83735 ASSAY OF MAGNESIUM: CPT

## 2021-11-07 PROCEDURE — 83880 ASSAY OF NATRIURETIC PEPTIDE: CPT

## 2021-11-07 RX ORDER — DIGOXIN 125 MCG
125 TABLET ORAL DAILY
Status: DISCONTINUED | OUTPATIENT
Start: 2021-11-08 | End: 2021-11-09 | Stop reason: HOSPADM

## 2021-11-07 RX ORDER — MECOBALAMIN 5000 MCG
5 TABLET,DISINTEGRATING ORAL NIGHTLY
Status: DISCONTINUED | OUTPATIENT
Start: 2021-11-07 | End: 2021-11-09 | Stop reason: HOSPADM

## 2021-11-07 RX ORDER — POTASSIUM CHLORIDE 7.45 MG/ML
10 INJECTION INTRAVENOUS 4 TIMES DAILY
Status: DISCONTINUED | OUTPATIENT
Start: 2021-11-07 | End: 2021-11-07

## 2021-11-07 RX ORDER — DIGOXIN 0.25 MG/ML
250 INJECTION INTRAMUSCULAR; INTRAVENOUS ONCE
Status: COMPLETED | OUTPATIENT
Start: 2021-11-07 | End: 2021-11-07

## 2021-11-07 RX ORDER — POTASSIUM CHLORIDE 20 MEQ/1
40 TABLET, EXTENDED RELEASE ORAL 2 TIMES DAILY
Status: COMPLETED | OUTPATIENT
Start: 2021-11-07 | End: 2021-11-07

## 2021-11-07 RX ADMIN — PSYLLIUM HUSK 1 PACKET: 3.4 POWDER ORAL at 11:26

## 2021-11-07 RX ADMIN — POTASSIUM CHLORIDE 40 MEQ: 20 TABLET, EXTENDED RELEASE ORAL at 19:53

## 2021-11-07 RX ADMIN — MAGNESIUM SULFATE 2000 MG: 2 INJECTION INTRAVENOUS at 16:20

## 2021-11-07 RX ADMIN — ASPIRIN 81 MG: 81 TABLET, CHEWABLE ORAL at 08:23

## 2021-11-07 RX ADMIN — POTASSIUM CHLORIDE 40 MEQ: 20 TABLET, EXTENDED RELEASE ORAL at 11:26

## 2021-11-07 RX ADMIN — LEVOTHYROXINE SODIUM 100 MCG: 0.1 TABLET ORAL at 06:39

## 2021-11-07 RX ADMIN — DIGOXIN 250 MCG: 250 INJECTION, SOLUTION INTRAMUSCULAR; INTRAVENOUS; PARENTERAL at 16:04

## 2021-11-07 RX ADMIN — ATORVASTATIN CALCIUM 40 MG: 40 TABLET, FILM COATED ORAL at 19:53

## 2021-11-07 RX ADMIN — Medication 5 MG: at 02:15

## 2021-11-07 RX ADMIN — SODIUM CHLORIDE, PRESERVATIVE FREE 10 ML: 5 INJECTION INTRAVENOUS at 08:23

## 2021-11-07 RX ADMIN — Medication 5 MG: at 19:53

## 2021-11-07 RX ADMIN — APIXABAN 5 MG: 5 TABLET, FILM COATED ORAL at 08:23

## 2021-11-07 RX ADMIN — METOPROLOL SUCCINATE 100 MG: 50 TABLET, EXTENDED RELEASE ORAL at 08:22

## 2021-11-07 RX ADMIN — APIXABAN 5 MG: 5 TABLET, FILM COATED ORAL at 19:53

## 2021-11-07 RX ADMIN — SODIUM CHLORIDE, PRESERVATIVE FREE 10 ML: 5 INJECTION INTRAVENOUS at 19:54

## 2021-11-07 RX ADMIN — FUROSEMIDE 20 MG: 20 TABLET ORAL at 08:23

## 2021-11-07 RX ADMIN — METOPROLOL SUCCINATE 100 MG: 50 TABLET, EXTENDED RELEASE ORAL at 19:53

## 2021-11-07 RX ADMIN — CEFTRIAXONE SODIUM 2000 MG: 2 INJECTION, POWDER, FOR SOLUTION INTRAMUSCULAR; INTRAVENOUS at 11:31

## 2021-11-07 RX ADMIN — LISINOPRIL 5 MG: 5 TABLET ORAL at 08:22

## 2021-11-07 ASSESSMENT — PAIN SCALES - GENERAL
PAINLEVEL_OUTOF10: 0

## 2021-11-07 ASSESSMENT — PAIN SCALES - WONG BAKER: WONGBAKER_NUMERICALRESPONSE: 0

## 2021-11-07 NOTE — PROGRESS NOTES
Hospitalist Progress Note      PCP: Rad Bean MD    Date of Admission: 10/30/2021    Chief Complaint: Nausea, fatigue and diarrhea     Subjective:   No acute events overnight. She reported being SOB overnight but feels this is more due to anxiety. This morning she reported being constipated and having hemorrhoids. She would like to restart her home bowel regimen. She denies chest pressure, palpitations, N/V/D, and abdominal pain. Medications:  Reviewed    Infusion Medications    sodium chloride      sodium chloride 25 mL (11/02/21 1244)    dextrose      sodium chloride       Scheduled Medications    melatonin  5 mg Oral Nightly    metoprolol succinate  100 mg Oral BID    apixaban  5 mg Oral BID    furosemide  20 mg Oral Daily    aspirin  81 mg Oral Daily    sodium chloride flush  5-40 mL IntraVENous 2 times per day    atorvastatin  40 mg Oral Nightly    lisinopril  5 mg Oral Daily    cefTRIAXone (ROCEPHIN) IV  2,000 mg IntraVENous Q24H    sodium chloride flush  10 mL IntraVENous 2 times per day    levothyroxine  100 mcg Oral Daily    sodium chloride flush  5-40 mL IntraVENous 2 times per day     PRN Meds: sodium chloride flush, sodium chloride, perflutren lipid microspheres, calcium carbonate, sodium chloride flush, sodium chloride, magnesium sulfate, acetaminophen **OR** acetaminophen, glucose, dextrose, glucagon (rDNA), dextrose, sodium chloride flush, sodium chloride, ondansetron      Intake/Output Summary (Last 24 hours) at 11/7/2021 0728  Last data filed at 11/7/2021 0521  Gross per 24 hour   Intake 460 ml   Output 300 ml   Net 160 ml       Physical Exam Performed:  BP (!) 164/101   Pulse 96   Temp 98.4 °F (36.9 °C) (Oral)   Resp 20   Ht 5' 6\" (1.676 m)   Wt 155 lb 14.4 oz (70.7 kg)   SpO2 95%   BMI 25.16 kg/m²     General appearance: No apparent distress, appears stated age and cooperative  HEENT: Conjunctivae/corneas clear. Neck: No jugular venous distention. Respiratory:  Normal respiratory effort. Clear to auscultation, bilaterally without Rales/Wheezes/Rhonchi. Cardiovascular: Regular rate and rhythm with normal S1/S2 without murmurs, rubs or gallops. Abdomen: Soft, non-distended, non-tender  Musculoskeletal: No clubbing, cyanosis or edema bilaterally. Skin: Skin color, texture, turgor normal.  No rashes or lesions. Neurologic:  Neurovascularly intact without any focal sensory/motor deficits. Psychiatric: Alert and oriented, thought content appropriate, normal insight    Labs:   Recent Labs     11/05/21 0439 11/06/21 0451 11/07/21  0547   WBC 19.5* 17.8* 17.4*   HGB 12.2 12.8 11.4*   HCT 36.4 37.9 33.6*    368 374     Recent Labs     11/05/21 0439 11/06/21 0451 11/07/21  0547   * 140 135*   K 3.3* 4.1 3.3*   CL 98* 99 97*   CO2 27 30 29   BUN 22* 19 15   CREATININE 1.1 0.9 0.8   CALCIUM 8.8 8.9 8.6     Urinalysis:    Lab Results   Component Value Date    NITRU Negative 10/30/2021    WBCUA >100 10/30/2021    BACTERIA 3+ 10/30/2021    RBCUA see below 10/30/2021    BLOODU MODERATE 10/30/2021    SPECGRAV 1.020 10/30/2021    GLUCOSEU Negative 10/30/2021       Radiology:  CT ABDOMEN PELVIS WO CONTRAST Additional Contrast? None   Final Result   1. No acute finding in the abdomen or pelvis. 2. Cholelithiasis and diverticulosis noted incidentally. 3. Trace pleural effusions in the lower chest with peripheral airspace   opacities. Correlate with any pulmonary symptoms. A developing viral   infection or pulmonary edema may be considered. XR CHEST PORTABLE   Final Result   Mild cardiomegaly and COPD. No other cardiopulmonary disease. Active Hospital Problems    Diagnosis     Cardiomyopathy [facility]) [I42.9]     Acute systolic heart failure (HCC) [I50.21]     Dyspnea [R06.00]     Sepsis ([facility] [address] [A41.9]      Assessment/Plan:  1.  Acute E. coli cystitis with Bacteremia   - WBC has decreased to 17.8, could also be reactive  - Symptomatically improving   - CXR showed no acute process & Covid negative  - Blood cultures x2 grew E. Coli , pan sensitive with resistance to bactrim   - Urine culture showed E. coli   - Past abx regimen: Vancomycin x1 on 10/30 and Zosyn (3 days)   - Continue 2 g of ceftriaxone   - Repeat blood culture NGTD     2. Acute Biventricular Systolic HF HFrEF- newly diagnosed this admission  - Echo demonstrated decreased EF to 35-40% and left atrium is severely dilated. Previous Echo completed at [facility] on 6/2020 had EF of 52-57%   - Negative EKG and troponins on admission  - BNP trending down from recent rise  - Strict I/Os   - Daily weights   - Encouraged incentive spirometry use   - Cardiology consulted and recommended:   - Continue lisinopril, metoprolol, po lasix     - Will add digoxin 125 mcg if HR uncontrolled after AM meds    - Considering ROBERTO and DCCV once recovered from infection    3. Non-obstructive CAD  - LHC/RHC completed 11/5: Non-obstructive CAD, non-ischemic cardiomyopathy, mild pulmonary hypertension  -Continue aspirin, statin, lisinopril     4. Electrolyte disturbance   - Nephrology following and assisting with management  - Cardiology would like K > 4, Mg > 2  - Replaced K with 2 doses of 40 mEq of potassium chloride today  - Replace Mg PRN  - Will continue to monitor     5. A. Fib with RVR   - Per cardiology, severely dilated left atrium. coreg has been changed to metoprolol for better rate control  - Continue metoprolol   - Continue Eliquis BID     6. Constipation  -Restarted home bowel regimen of daily metamucil     7. SHAY -Resolved    - Etiology unknown. Likely secondary to dehydration, sepsis  - Cr 0.8 from 1.7 on admission  - Nephrology following and assisting with management  - Will continue to monitor     8. Hyperglycemia W/o Diabetes   - A1C of 5.7%   - Will continue to monitor     9. Hypothyroidism  -Continue home levothyroxine     DVT Prophylaxis: Eliquis   Diet: ADULT DIET;  Regular  ADULT ORAL NUTRITION SUPPLEMENT; Breakfast, Lunch, Dinner; Standard High Calorie/High Protein Oral Supplement  Code Status: Full Code    PT/OT Eval Status: 24 hour supervision or assist, home with home health PT/OT     Dispo - likely in 1-2 days pending clinical improvement & better HR control     Pily Lopez MD     Addendum to Resident H& P/Progress note:  Pt seen,examined and evaluated with resident and discussed regarding POC . I have reviewed the current history, physical findings, labs and assessment and plan and agree with note as documented by resident DO ( Dr. Elver Mcmahon )    Patient seen and examined with Resident . Doing better . C/o Constipation . Will start Miralax . Encouraged increasing activity - sitting up in chair for an Hour twice daily , Continue rest of the current care . Will follow.      Joanna Mcknight MD  Hospitalist Physician

## 2021-11-07 NOTE — FLOWSHEET NOTE
11/06/21 2154   Assessment   Charting Type Shift assessment   Neurological   Neuro (WDL) WDL   Level of Consciousness Alert (0)   Swallow Screening   Is the patient able to remain alert for testing? Yes   Was the Patient Eating a Modified Diet Prior to being Admitted? No   Is there an Existing PEG or Abdominal Feeding Tube? No   Stewart Coma Scale   Eye Opening 4   Best Verbal Response 5   Best Motor Response 6   Jeanne Coma Scale Score 15   NIHSS Stroke Scale   NIHSS Stroke Scale Assessed No   HEENT   HEENT (WDL) X   Right Eye Intact; Impaired vision   Left Eye Intact; Impaired vision   Nose Intact   Throat Intact   Neck Trachea midline; Symmetrical   Tongue Pink & moist   Voice Normal   Mucous Membrane Moist; Pink; Intact   Teeth Dentures lower   Respiratory   Respiratory (WDL) WDL   Respiratory Pattern Regular   Respiratory Depth Normal   Respiratory Quality/Effort Unlabored   Chest Assessment Trachea midline; Chest expansion symmetrical   L Breath Sounds Diminished   R Breath Sounds Diminished   Cardiac   Cardiac (WDL) X   Cardiac Regularity Irregular   Heart Sounds S1, S2   Cardiac Rhythm Atrial fib; PVC   Rhythm Interpretation   Pulse 121   Cardiac Monitor   Telemetry Monitor On Yes   Telemetry Audible Yes   Telemetry Alarms Set Yes   Telemetry Box Number 2   Gastrointestinal   Abdominal (WDL) WDL   Abdomen Inspection Rounded;  Soft   Tenderness Soft; Nontender   RUQ Bowel Sounds Active   LUQ Bowel Sounds Active   RLQ Bowel Sounds Active   LLQ Bowel Sounds Active   Peripheral Vascular   Peripheral Vascular (WDL) WDL   Edema None   RLE Edema None   Puncture Site Assessment 1   Location Radial - right   Site Assessment No redness, drainage, swelling or hematoma   Hemostasis Intervention Discontinued   Dressing Applied Transparent occlusive dressing   Multiple puncture sites No   Skin Color/Condition   Skin Color/Condition (WDL) WDL   Skin Integrity   Skin Integrity (WDL) X   Skin Integrity Bruising   Location scattered   Musculoskeletal   Musculoskeletal (WDL) WDL   RL Extremity Full movement   LL Extremity Full movement   Genitourinary   Genitourinary (WDL) WDL   Flank Tenderness No   Suprapubic Tenderness No   Dysuria No   Urine Assessment   Incontinence No   Anus/Rectum   Anus/Rectum (WDL) X  (hemorhoids)   Psychosocial   Psychosocial (WDL) WDL   Patient Behaviors Appropriate for age; Anxious;  Cooperative

## 2021-11-07 NOTE — PROGRESS NOTES
tablet 125 mcg, Daily  metoprolol succinate (TOPROL XL) extended release tablet 100 mg, BID  apixaban (ELIQUIS) tablet 5 mg, BID  furosemide (LASIX) tablet 20 mg, Daily  aspirin chewable tablet 81 mg, Daily  sodium chloride flush 0.9 % injection 5-40 mL, 2 times per day  sodium chloride flush 0.9 % injection 5-40 mL, PRN  0.9 % sodium chloride infusion, PRN  atorvastatin (LIPITOR) tablet 40 mg, Nightly  lisinopril (PRINIVIL;ZESTRIL) tablet 5 mg, Daily  perflutren lipid microspheres (DEFINITY) injection 1.65 mg, ONCE PRN  cefTRIAXone (ROCEPHIN) 2000 mg IVPB in D5W 50ml minibag, Q24H  calcium carbonate (TUMS) chewable tablet 500 mg, TID PRN  sodium chloride flush 0.9 % injection 10 mL, 2 times per day  sodium chloride flush 0.9 % injection 10 mL, PRN  0.9 % sodium chloride infusion, PRN  magnesium sulfate 2000 mg in 50 mL IVPB premix, PRN  acetaminophen (TYLENOL) tablet 650 mg, Q6H PRN   Or  acetaminophen (TYLENOL) suppository 650 mg, Q6H PRN  glucose (GLUTOSE) 40 % oral gel 15 g, PRN  dextrose 50 % IV solution, PRN  glucagon (rDNA) injection 1 mg, PRN  dextrose 5 % solution, PRN  levothyroxine (SYNTHROID) tablet 100 mcg, Daily  sodium chloride flush 0.9 % injection 5-40 mL, 2 times per day  sodium chloride flush 0.9 % injection 5-40 mL, PRN  0.9 % sodium chloride infusion, PRN  ondansetron (ZOFRAN) injection 4 mg, Q1H PRN        Review of Systems:   14 point ROS obtained but were negative except mentioned in HPI      Physical exam:     Vitals:  /72   Pulse 102   Temp 97.5 °F (36.4 °C) (Oral)   Resp 18   Ht 5' 6\" (1.676 m)   Wt 155 lb 14.4 oz (70.7 kg)   SpO2 93%   BMI 25.16 kg/m²   Constitutional:  OAA X3 NAD  Skin: no rash, turgor wnl  Heent:  eomi, mmm  Neck: no bruits or jvd noted  Cardiovascular:  S1, S2 without m/r/g  Respiratory: CTA B without w/r/r  Abdomen:  +bs, soft, nt, nd  Ext: no  lower extremity edema  Psychiatric: mood and affect appropriate  Musculoskeletal:  Rom, muscular strength intact    Data:   Labs:  CBC:   Recent Labs     11/05/21  0439 11/06/21  0451 11/07/21  0547   WBC 19.5* 17.8* 17.4*   HGB 12.2 12.8 11.4*    368 374     BMP:    Recent Labs     11/05/21  0439 11/06/21  0451 11/07/21  0547   * 140 135*   K 3.3* 4.1 3.3*   CL 98* 99 97*   CO2 27 30 29   BUN 22* 19 15   CREATININE 1.1 0.9 0.8   GLUCOSE 130* 122* 128*     Ca/Mg/Phos:   Recent Labs     11/05/21 0439 11/06/21  0451 11/07/21  0547   CALCIUM 8.8 8.9 8.6   MG 1.70* 2.20 1.80     Hepatic:   No results for input(s): AST, ALT, ALB, BILITOT, ALKPHOS in the last 72 hours. Troponin:   No results for input(s): TROPONINI in the last 72 hours. BNP: No results for input(s): BNP in the last 72 hours. Lipids: No results for input(s): CHOL, TRIG, HDL, LDLCALC, LABVLDL in the last 72 hours. ABGs: No results for input(s): PHART, PO2ART, BXZ1RKN in the last 72 hours. INR: No results for input(s): INR in the last 72 hours. UA:  No results for input(s): Lorelie [place], GLUCOSEU, BILIRUBINUR, KETUA, SPECGRAV, BLOODU, PHUR, PROTEINU, UROBILINOGEN, NITRU, LEUKOCYTESUR, Terrea Billet in the last 72 hours. Urine Microscopic:   No results for input(s): LABCAST, BACTERIA, COMU, HYALCAST, WBCUA, RBCUA, EPIU in the last 72 hours. Urine Culture:   No results for input(s): LABURIN in the last 72 hours. Urine Chemistry:   No results for input(s): Holli Sandman, PROTEINUR, NAUR in the last 72 hours. IMAGING:  CT ABDOMEN PELVIS WO CONTRAST Additional Contrast? None   Final Result   1. No acute finding in the abdomen or pelvis. 2. Cholelithiasis and diverticulosis noted incidentally. 3. Trace pleural effusions in the lower chest with peripheral airspace   opacities. Correlate with any pulmonary symptoms. A developing viral   infection or pulmonary edema may be considered. XR CHEST PORTABLE   Final Result   Mild cardiomegaly and COPD. No other cardiopulmonary disease. Assessment/Plan   1. Hyponatremia      Serum Na 122  -----> 126    Etiology could be hypovolemic hyponatremia due to vomiting , diarrhea   Poor solute intake VS SIADH    Volume status : hypovolemic side    Urine Na < 20     Plan    Serum Na 135     Cont lasix      DC salt tablet    Encourage high protein/solute diet    Moniter serum Na every 12 h      2. NAGMA 2/2 diarrhea    improved    3.  CKD stage 3    homeroier    Serum cr stable    Avoid NSAIDs      4 N/V/D    improved      5 Hypokalemia    Replace with KCL prn      6 Acute CHF    Cont lasix and ACEI    7 Hypomagnesemia    Replace with IV Mg                Thank you for allowing us to participate in care of Amy Cardoso MD  Feel free to contact me   [facility] [address]  Office : [phone]  Fax :[phone]

## 2021-11-07 NOTE — PROGRESS NOTES
[address] 82    Progress Note     Admit Date: 10/30/2021     Reason for follow up: Heart failure and atrial fibrillation    HPI and Interval History: being followed for CHF, atrial fibrillation. History of nonischemic cardiomyopathy, mitral regurg, tobacco abuse. Admitted for UTI, sepsis, E. coli bacteremia, C. difficile. LVEF this admission down to 35 to 40%. Patient seen and examined. Clinical notes reviewed. Telemetry reviewed. No new complaint today. No major events overnight. Denies having chest pain, shortness of breath, dyspnea on exertion, Orthopnea, PND at the time of this visit. Patient feels better. She wants to know if she can go home  Review of System:  All other systems reviewed and are negative except for that noted above. Pertinent negatives are:     · General: negative for fever, chills   · Ophthalmic ROS: negative for - eye pain or loss of vision  · ENT ROS: negative for - headaches, sore throat   · Respiratory: negative for - cough, sputum  · Cardiovascular: Reviewed in HPI  · Gastrointestinal: negative for - abdominal pain, diarrhea, N/V  · Hematology: negative for - bleeding, blood clots, bruising or jaundice  · Genito-Urinary:  negative for - Dysuria or incontinence  · Musculoskeletal: negative for - Joint swelling, muscle pain  · Neurological: negative for - confusion, dizziness, headaches   · Psychiatric: No anxiety, no depression.   · Dermatological: negative for - rash      Physical Examination:  Vitals:    21 1135   BP: 114/72   Pulse: 102   Resp: 18   Temp: 97.5 °F (36.4 °C)   SpO2: 93%      In: 370 [P.O.:360; I.V.:10]  Out: -    Wt Readings from Last 3 Encounters:   21 155 lb 14.4 oz (70.7 kg)   10/27/21 154 lb (69.9 kg)   18 150 lb (68 kg)     Temp  Av.1 °F (36.7 °C)  Min: 97.5 °F (36.4 °C)  Max: 98.7 °F (37.1 °C)  Pulse  Av.8  Min: 95  Max: 121  BP  Min: 114/72  Max: 164/101  SpO2  Av.2 %  Min: 92 %  Max: 95 %    Intake/Output Summary (Last 24 hours) at 11/7/2021 1300  Last data filed at 11/7/2021 4907  Gross per 24 hour   Intake 470 ml   Output --   Net 470 ml       · Telemetry: Atrial fibrillation, PVC, nonsustained VT  · Constitutional: Oriented. No distress. · Head: Normocephalic and atraumatic. · Mouth/Throat: Oropharynx is clear and moist.   · Eyes: Conjunctivae normal. EOM are normal.   · Neck: Neck supple. No rigidity. No JVD present. · Cardiovascular: Normal rate, irregular rhythm, S1&S2. · Pulmonary/Chest: Bilateral respiratory sounds. No wheezes, No rhonchi. · Abdominal: Soft. Bowel sounds present. No distension, No tenderness. · Musculoskeletal: No tenderness. No edema    · Lymphadenopathy: Has no cervical adenopathy. · Neurological: Alert and oriented. Cranial nerve appears intact, No Gross deficit   · Skin: Skin is warm and dry. No rash noted. · Psychiatric: Has a normal behavior     Labs, diagnostic and imaging results reviewed. Reviewed. Recent Labs     11/05/21 0439 11/06/21 0451 11/07/21  0547   * 140 135*   K 3.3* 4.1 3.3*   CL 98* 99 97*   CO2 27 30 29   BUN 22* 19 15   CREATININE 1.1 0.9 0.8     Recent Labs     11/05/21 0439 11/06/21 0451 11/07/21  0547   WBC 19.5* 17.8* 17.4*   HGB 12.2 12.8 11.4*   HCT 36.4 37.9 33.6*   MCV 87.1 88.1 86.6    368 374     Lab Results   Component Value Date    TROPONINI <0.01 10/30/2021     Estimated Creatinine Clearance: 53 mL/min (based on SCr of 0.8 mg/dL).    No results found for: BNP  No results found for: PROTIME, INR  No results found for: CHOL, HDL, TRIG    Scheduled Meds:   melatonin  5 mg Oral Nightly    psyllium  1 packet Oral Daily    potassium chloride  40 mEq Oral BID    metoprolol succinate  100 mg Oral BID    apixaban  5 mg Oral BID    furosemide  20 mg Oral Daily    aspirin  81 mg Oral Daily    sodium chloride flush  5-40 mL IntraVENous 2 times per day    atorvastatin  40 mg Oral Nightly    lisinopril  5 mg Oral Daily  cefTRIAXone (ROCEPHIN) IV  2,000 mg IntraVENous Q24H    sodium chloride flush  10 mL IntraVENous 2 times per day    levothyroxine  100 mcg Oral Daily    sodium chloride flush  5-40 mL IntraVENous 2 times per day     Continuous Infusions:   sodium chloride      sodium chloride 25 mL (11/02/21 1244)    dextrose      sodium chloride       PRN Meds:sodium chloride flush, sodium chloride, perflutren lipid microspheres, calcium carbonate, sodium chloride flush, sodium chloride, magnesium sulfate, acetaminophen **OR** acetaminophen, glucose, dextrose, glucagon (rDNA), dextrose, sodium chloride flush, sodium chloride, ondansetron     Patient Active Problem List    Diagnosis Date Noted    Cardiomyopathy ([facility] [address]     Acute systolic heart failure (HCC)     Dyspnea     [facility] 10/30/2021    Primary osteoarthritis of right knee 04/11/2016    Tear of medial meniscus of right knee 02/25/2016    Tear of lateral cartilage or meniscus of knee, current 07/24/2015    Localized osteoarthrosis, lower leg 05/11/2015      Active Hospital Problems    Diagnosis Date Noted    Cardiomyopathy [facility]) [I42.9]     Acute systolic heart failure (HCC) [I50.21]     Dyspnea [R06.00]     Sepsis ([facility] [address] [A41.9] 10/30/2021       Assessment:       Plan:     -Acute heart failure, HFrEF    She is doing well and symptomatically has improved. She is on ACE inhibitor, beta-blocker and p.o. diuretic. Continue current management.      -Nonischemic cardiomyopathy  Continue current guideline directed medical therapy.      -Atrial fibrillation    Rate is still in the range of 100-110 bpm.  I will add digoxin to help better control the heart rate and also help with the heart failure. Consider ROBERTO cardioversion tomorrow.    -Nonobstructive CAD    Continue aspirin and statin. -UTI    On antibiotic. -PVC and nonsustained VT    Continue beta-blocker.   It is expected that the ventricular ectopy improves with improvement of heart failure. -Hypokalemia    Replace and monitor. NOTE: This report was transcribed using voice recognition software. Every effort was made to ensure accuracy, however, inadvertent computerized transcription errors may be present.

## 2021-11-08 LAB
ANION GAP SERPL CALCULATED.3IONS-SCNC: 10 MMOL/L (ref 3–16)
BASOPHILS ABSOLUTE: 0.1 K/UL (ref 0–0.2)
BASOPHILS RELATIVE PERCENT: 0.6 %
BUN BLDV-MCNC: 12 MG/DL (ref 7–20)
CALCIUM SERPL-MCNC: 8.9 MG/DL (ref 8.3–10.6)
CHLORIDE BLD-SCNC: 97 MMOL/L (ref 99–110)
CO2: 27 MMOL/L (ref 21–32)
CREAT SERPL-MCNC: 0.8 MG/DL (ref 0.6–1.2)
EOSINOPHILS ABSOLUTE: 0.3 K/UL (ref 0–0.6)
EOSINOPHILS RELATIVE PERCENT: 1.5 %
GFR AFRICAN AMERICAN: >60
GFR NON-AFRICAN AMERICAN: >60
GLUCOSE BLD-MCNC: 107 MG/DL (ref 70–99)
HCT VFR BLD CALC: 33.9 % (ref 36–48)
HEMOGLOBIN: 11.7 G/DL (ref 12–16)
LYMPHOCYTES ABSOLUTE: 2.4 K/UL (ref 1–5.1)
LYMPHOCYTES RELATIVE PERCENT: 12.6 %
MAGNESIUM: 2 MG/DL (ref 1.8–2.4)
MCH RBC QN AUTO: 30 PG (ref 26–34)
MCHC RBC AUTO-ENTMCNC: 34.5 G/DL (ref 31–36)
MCV RBC AUTO: 87.2 FL (ref 80–100)
MONOCYTES ABSOLUTE: 1.5 K/UL (ref 0–1.3)
MONOCYTES RELATIVE PERCENT: 7.9 %
NEUTROPHILS ABSOLUTE: 14.6 K/UL (ref 1.7–7.7)
NEUTROPHILS RELATIVE PERCENT: 77.4 %
PDW BLD-RTO: 13.7 % (ref 12.4–15.4)
PLATELET # BLD: 429 K/UL (ref 135–450)
PMV BLD AUTO: 7.3 FL (ref 5–10.5)
POTASSIUM SERPL-SCNC: 4.1 MMOL/L (ref 3.5–5.1)
RBC # BLD: 3.88 M/UL (ref 4–5.2)
SARS-COV-2, NAAT: DETECTED
SODIUM BLD-SCNC: 134 MMOL/L (ref 136–145)
WBC # BLD: 18.8 K/UL (ref 4–11)

## 2021-11-08 PROCEDURE — 99232 SBSQ HOSP IP/OBS MODERATE 35: CPT | Performed by: HOSPITALIST

## 2021-11-08 PROCEDURE — 6370000000 HC RX 637 (ALT 250 FOR IP): Performed by: STUDENT IN AN ORGANIZED HEALTH CARE EDUCATION/TRAINING PROGRAM

## 2021-11-08 PROCEDURE — 80048 BASIC METABOLIC PNL TOTAL CA: CPT

## 2021-11-08 PROCEDURE — 6360000002 HC RX W HCPCS: Performed by: INTERNAL MEDICINE

## 2021-11-08 PROCEDURE — 6370000000 HC RX 637 (ALT 250 FOR IP): Performed by: NURSE PRACTITIONER

## 2021-11-08 PROCEDURE — 97116 GAIT TRAINING THERAPY: CPT

## 2021-11-08 PROCEDURE — 85025 COMPLETE CBC W/AUTO DIFF WBC: CPT

## 2021-11-08 PROCEDURE — 36415 COLL VENOUS BLD VENIPUNCTURE: CPT

## 2021-11-08 PROCEDURE — 6370000000 HC RX 637 (ALT 250 FOR IP): Performed by: INTERNAL MEDICINE

## 2021-11-08 PROCEDURE — 2060000000 HC ICU INTERMEDIATE R&B

## 2021-11-08 PROCEDURE — 87635 SARS-COV-2 COVID-19 AMP PRB: CPT

## 2021-11-08 PROCEDURE — 6370000000 HC RX 637 (ALT 250 FOR IP)

## 2021-11-08 PROCEDURE — 97530 THERAPEUTIC ACTIVITIES: CPT

## 2021-11-08 PROCEDURE — 83735 ASSAY OF MAGNESIUM: CPT

## 2021-11-08 PROCEDURE — 2580000003 HC RX 258: Performed by: INTERNAL MEDICINE

## 2021-11-08 PROCEDURE — 99233 SBSQ HOSP IP/OBS HIGH 50: CPT | Performed by: INTERNAL MEDICINE

## 2021-11-08 RX ORDER — METOPROLOL SUCCINATE 50 MG/1
50 TABLET, EXTENDED RELEASE ORAL ONCE
Status: COMPLETED | OUTPATIENT
Start: 2021-11-08 | End: 2021-11-08

## 2021-11-08 RX ORDER — METOPROLOL SUCCINATE 50 MG/1
150 TABLET, EXTENDED RELEASE ORAL 2 TIMES DAILY
Status: DISCONTINUED | OUTPATIENT
Start: 2021-11-08 | End: 2021-11-09 | Stop reason: HOSPADM

## 2021-11-08 RX ORDER — LISINOPRIL 10 MG/1
10 TABLET ORAL DAILY
Status: DISCONTINUED | OUTPATIENT
Start: 2021-11-09 | End: 2021-11-09 | Stop reason: HOSPADM

## 2021-11-08 RX ORDER — LISINOPRIL 5 MG/1
5 TABLET ORAL ONCE
Status: COMPLETED | OUTPATIENT
Start: 2021-11-08 | End: 2021-11-08

## 2021-11-08 RX ADMIN — APIXABAN 5 MG: 5 TABLET, FILM COATED ORAL at 11:36

## 2021-11-08 RX ADMIN — LISINOPRIL 5 MG: 5 TABLET ORAL at 09:32

## 2021-11-08 RX ADMIN — SODIUM CHLORIDE, PRESERVATIVE FREE 10 ML: 5 INJECTION INTRAVENOUS at 22:49

## 2021-11-08 RX ADMIN — LEVOTHYROXINE SODIUM 100 MCG: 0.1 TABLET ORAL at 09:32

## 2021-11-08 RX ADMIN — FUROSEMIDE 20 MG: 20 TABLET ORAL at 09:32

## 2021-11-08 RX ADMIN — Medication 5 MG: at 22:49

## 2021-11-08 RX ADMIN — METOPROLOL SUCCINATE 50 MG: 50 TABLET, EXTENDED RELEASE ORAL at 11:36

## 2021-11-08 RX ADMIN — CEFTRIAXONE SODIUM 2000 MG: 2 INJECTION, POWDER, FOR SOLUTION INTRAMUSCULAR; INTRAVENOUS at 11:38

## 2021-11-08 RX ADMIN — METOPROLOL SUCCINATE 150 MG: 50 TABLET, EXTENDED RELEASE ORAL at 22:49

## 2021-11-08 RX ADMIN — ASPIRIN 81 MG: 81 TABLET, CHEWABLE ORAL at 11:36

## 2021-11-08 RX ADMIN — DIGOXIN 125 MCG: 125 TABLET ORAL at 09:32

## 2021-11-08 RX ADMIN — METOPROLOL SUCCINATE 100 MG: 50 TABLET, EXTENDED RELEASE ORAL at 09:32

## 2021-11-08 RX ADMIN — ACETAMINOPHEN 650 MG: 325 TABLET ORAL at 20:29

## 2021-11-08 RX ADMIN — PSYLLIUM HUSK 1 PACKET: 3.4 POWDER ORAL at 12:42

## 2021-11-08 RX ADMIN — ATORVASTATIN CALCIUM 40 MG: 40 TABLET, FILM COATED ORAL at 22:49

## 2021-11-08 RX ADMIN — LISINOPRIL 5 MG: 5 TABLET ORAL at 12:42

## 2021-11-08 RX ADMIN — APIXABAN 5 MG: 5 TABLET, FILM COATED ORAL at 22:49

## 2021-11-08 RX ADMIN — SODIUM CHLORIDE, PRESERVATIVE FREE 10 ML: 5 INJECTION INTRAVENOUS at 09:22

## 2021-11-08 ASSESSMENT — PAIN SCALES - GENERAL
PAINLEVEL_OUTOF10: 0
PAINLEVEL_OUTOF10: 3
PAINLEVEL_OUTOF10: 0
PAINLEVEL_OUTOF10: 2
PAINLEVEL_OUTOF10: 0

## 2021-11-08 NOTE — CONSULTS
[address] [place] [address]    History:  Past Medical History:   Diagnosis Date    COVID-19 11/08/2021    Hypertension     Thyroid disease     hypothyroidism       ECHO:  11/3/21  EF 35-40%  HgA1C:  10/31/21  5.7    ACE/ARB: Lisinopril 10 mg daily    BB: Toprol XL  150 mg BID    Last Hospital Admission:  3/27/2007    Discharge plans: to return to home with  Mary Ann Miranda     Advanced Directives: patient full code     Chart review completed. Pt Covid 19 POSITIVE. Plans at this time are for patient to return home with family. Discharge timing is unclear, pt remains on IV antibiotics. Please advise as needs arise.      Patient recent weights and intake/output reviewed:    Patient Vitals for the past 96 hrs (Last 3 readings):   Weight   11/08/21 0518 154 lb (69.9 kg)   11/07/21 0104 155 lb 14.4 oz (70.7 kg)   11/06/21 0444 155 lb 8 oz (70.5 kg)         Intake/Output Summary (Last 24 hours) at 11/8/2021 1432  Last data filed at 11/8/2021 0017  Gross per 24 hour   Intake 1080 ml   Output --   Net 1080 ml       Education Time: Chart review completed      Gilmar Altamirano RN   11/8/2021 2:32 PM

## 2021-11-08 NOTE — CARE COORDINATION
Care being managed by [id number] [id number], cardiology, neph. LOS 9. Pt from home w spouse. Found to be Covid positive- currently on RA. [facility] is following for DC needs. Therapy recs for [facility]. Remains on IV ABX at present. CM continues to follow.   Keya Meier RN

## 2021-11-08 NOTE — PROGRESS NOTES
[facility]   PROGRESS NOTE  [phone]      Attending Physician: Jessica Koch MD  Reason for Consultation/Chief Complaint: Atrial fibrillation, acute heart failure    Subjective     Sitting up in chair today and reports that she is starting to feel better, but continues to complain of generalized weakness. Remains in atrial fibrillation with ventricular rate currently in 100-110s. Had planned for ROBERTO/DCCV later today, but COVID-19 NAAT returned positive.       CURRENT Medications:  melatonin disintegrating tablet 5 mg, Nightly  psyllium (METAMUCIL) 58.12 % packet 1 packet, Daily  digoxin (LANOXIN) tablet 125 mcg, Daily  metoprolol succinate (TOPROL XL) extended release tablet 100 mg, BID  apixaban (ELIQUIS) tablet 5 mg, BID  furosemide (LASIX) tablet 20 mg, Daily  aspirin chewable tablet 81 mg, Daily  sodium chloride flush 0.9 % injection 5-40 mL, 2 times per day  sodium chloride flush 0.9 % injection 5-40 mL, PRN  0.9 % sodium chloride infusion, PRN  atorvastatin (LIPITOR) tablet 40 mg, Nightly  lisinopril (PRINIVIL;ZESTRIL) tablet 5 mg, Daily  perflutren lipid microspheres (DEFINITY) injection 1.65 mg, ONCE PRN  cefTRIAXone (ROCEPHIN) 2000 mg IVPB in D5W 50ml minibag, Q24H  calcium carbonate (TUMS) chewable tablet 500 mg, TID PRN  sodium chloride flush 0.9 % injection 10 mL, 2 times per day  sodium chloride flush 0.9 % injection 10 mL, PRN  0.9 % sodium chloride infusion, PRN  acetaminophen (TYLENOL) tablet 650 mg, Q6H PRN   Or  acetaminophen (TYLENOL) suppository 650 mg, Q6H PRN  glucose (GLUTOSE) 40 % oral gel 15 g, PRN  dextrose 50 % IV solution, PRN  glucagon (rDNA) injection 1 mg, PRN  dextrose 5 % solution, PRN  levothyroxine (SYNTHROID) tablet 100 mcg, Daily  sodium chloride flush 0.9 % injection 5-40 mL, 2 times per day  sodium chloride flush 0.9 % injection 5-40 mL, PRN  0.9 % sodium chloride infusion, PRN  ondansetron (ZOFRAN) injection 4 mg, Q1H PRN        Allergies:  Shellfish-derived products     Review of Systems:   General: Positive for general malaise. Cardiac: No chest pain or palpitations. Respiratory: No cough or shortness of breath. GI: No nausea, vomiting, or diarrhea. Neuro: No lightheadedness or dizziness. Objective   PHYSICAL EXAM:    Vitals:    11/08/21 0545   BP: (!) 157/93   Pulse: 97   Resp: 18   Temp: 98.5 °F (36.9 °C)   SpO2: 94%    Weight: 154 lb (69.9 kg)      General: Elderly female sitting up in chair in no acute distress. Pleasant and interactive on exam.  HEENT: Normocephalic, atraumatic, non-icteric, hearing intact, nares normal, mucous membranes moist.  Neck: No appreciable JVD. Heart: Tachycardic with irregularly irregular rate and rhythm. Normal S1 and S2. Grade II/VI holosystolic murmur radiating to apex. Lungs: Normal respiratory effort. No wheezes, rales, or rhonchi. Abdomen: Soft, non-tender. Normoactive bowel sounds. No masses or organomegaly. Skin: No rashes, wounds, or lesions. Pulses: 2+ and symmetric. Extremities: No clubbing, cyanosis, or edema. Psych: Normal mood and affect. Neuro: Alert and oriented to person, place, and time.     Labs   CBC:   Lab Results   Component Value Date    WBC 18.8 11/08/2021    RBC 3.88 11/08/2021    HGB 11.7 11/08/2021    HCT 33.9 11/08/2021    MCV 87.2 11/08/2021    RDW 13.7 11/08/2021     11/08/2021     CMP:  Lab Results   Component Value Date     11/08/2021    K 4.1 11/08/2021    K 3.7 11/04/2021    CL 97 11/08/2021    CO2 27 11/08/2021    BUN 12 11/08/2021    CREATININE 0.8 11/08/2021    GFRAA >60 11/08/2021    GFRAA >60 02/25/2010    AGRATIO 0.8 11/01/2021    LABGLOM >60 11/08/2021    GLUCOSE 107 11/08/2021    PROT 5.8 11/01/2021    PROT 7.6 02/25/2010    CALCIUM 8.9 11/08/2021    BILITOT 1.4 11/01/2021    ALKPHOS 99 11/01/2021    AST 26 11/01/2021    ALT 28 11/01/2021     PT/INR:  No results found for: PTINR  HgBA1c:  Lab Results   Component Value Date LABA1C 5.7 10/31/2021     Lab Results   Component Value Date    TROPONINI <0.01 10/30/2021         Cardiac Data     TTE 11/3/21:  Conclusions   Summary   Technically difficult examination. Global left ventricular function is moderately decreased with ejection   fraction estimated at 35-40%. EF by Medina's method estimated at 35%. Regional wall motion abnormalities cannot be ruled out due to irregular   heart rate. Diastolic dysfunction grade and filing pressure are indeterminate. The left atrium is severely dilated. Right ventricular systolic function is reduced . Aortic valve appears sclerotic but opens adequately. Mitral annular calcification is present, more prominent on the posterior   mitral valve leaflet. Mild to moderate (posteriorly eccentric) mitral regurgitation. Mild pulmonic regurgitation. Moderate tricuspid valve regurgitation. Trace aortic valve regurgitation. Systolic pulmonary artery pressure (SPAP) estimated at 44 mmHg (right atrial   pressure 3 mmHg), consistent with mild pulmonary hypertension. Irregular heart rate throughout study. LHC/RHC 10/5/21:  Findings:  1. Hemodynamics:  A. Right heart catheterization                   1. RA: 8 mmHg                   2. RV: 42/8 mmHg                   3. PA: 40/24 (30) mmHg                   4. PCWP: 17 mmHg                   5. Saturations: AO 92%, RA 59%, PA 56%                   6. Danish CO: 4.48 L/min                   7. Danish CI: 2.43 L/min*m2                   8. Danish SVR: 1,625 dyne*sec/cm5                   9. Danish PVR: 232 dyne*sec/cm5              B. Opening arterial pressure: 134/76 (99) mmHg              C. LVEDP: 15 mmHg     2. Coronary anatomy:  A. Left main artery: The left main artery bifurcates into the left anterior descending artery and left circumflex artery. The left main artery has minor luminal irregularities. .  B. Left anterior descending artery: Transapical vessel which gives rise to 2 small diagonal arteries. The LAD has a 20% ostial stenosis, eccentric 40% proximal stenosis, and 30% mid-vessel stenosis. C. Left circumflex artery: Non-dominant vessel that gives rise to 3 obtuse marginal arteries and a posterolateral branch. The LCx has a 20% ostial stenosis. The remainder of the vessel is relatively free of disease. The OM1 is a very small vessel with a high origin. The OM2 is a large branching vessel with no angiographically significant disease. The left posterolateral branch has minor luminal irregularities. D. Right coronary artery: Dominant vessel. The RCA is medium caliber and has minor luminal irregularities. The right posterior descending artery has minor luminal irregularities. The right posterolateral branch has minor luminal irregularities. Assessment and Plan:      1. Acute biventricular systolic heart failure/non-ischemic cardiomyopathy - TTE from this admission shows reduction in LVEF to 35-40% from normal on last TTE from [facility] in 6/2020. Underwent invasive coronary angiography/LHC/RHC on 10/5/21 demonstrating non-obstructive coronary artery disease and mildly elevated left and right-sided cardiac filling pressures. Suspect non-ischemic cardiomyopathy is tachycardia-induced in setting of underlying atrial fibrillation. Currently appears to be near a clinically euvolemic state.  -Will increase metoprolol succinate to 150 mg BID for improved ventricular rate control  -BP continues to run above goal and will also increase lisinopril to 10 mg daily  -Continue PO maintenance lasix 20 mg daily  -Monitor strict I/Os, obtain daily standing weights  -Low sodium diet  -Maintain K>4 and Mg>2      2. Atrial fibrillation with RVR - Likely exacerbated by underlying infection. Ventricular rate currently in 100-110s.    -Had planned to perform ROBERTO/DCCV later today, but COVID-19 NAAT returned positive and will thus plan to perform as outpatient once patient has had time to recover from infection  -Will increase metoprolol succinate to 150 mg for improved rate control  -Continue digoxin 125 mcg daily  -Continue Eliquis 5 mg BID     3. Pulmonary hypertension - Likely secondary to left-sided heart failure. Mean PAP 30 mmHg on RHC.  -Management as above    4. Non-obstructive CAD  -Continue aspirin and statin     5. Mild-moderate mitral regurgitation  -Appears stable     6. UTI/E. Coli bacteremia  -Continue antibiotics per primary team    7. COVID-19 infection  -Initiate appropriate contact precautions  -Continue supportive care     8. Former tobacco use  -Continue to encourage avoidance of tobacco products       Thank you for allowing us to participate in the care of Khoi Peña. Please call me with any questions [phone]. Josefina Ashraf DO  [facility]  [phone] [facility]  [phone] [facility]  11/8/2021 9:04 AM      I will address the patient's cardiac risk factors and adjusted pharmacologic treatment as needed. In addition, I have reinforced the need for patient directed risk factor modification. All questions and concerns were addressed to the patient/family. Alternatives to my treatment were discussed. The note was completed using EMR. Every effort was made to ensure accuracy; however, inadvertent computerized transcription errors may be present.

## 2021-11-08 NOTE — PROGRESS NOTES
Physical Therapy  Facility/Department: [facility] C4 PCU  Daily Treatment Note  NAME: Samira Adames  : 1940  MRN: [id number]    Date of Service: 2021    Discharge Recommendations:  24 hour supervision or assist, Home with Home health PT   PT Equipment Recommendations  Equipment Needed: No    Assessment    Body structures, Functions, Activity limitations: Decreased functional mobility ; Decreased strength; Decreased endurance; Decreased balance  Assessment: Patient seen for gait and transfers. Patient cleared by RN for therapy participation this date. Patient agreeable to therapy. Patient completed ambulation up to 75 ft in room with supervision with slow geovani and pt noting increased SOB, VSS. Pt assisted with changing gown and linens d/t incontinent of urine upon arrival. Pt agreeable to sit up in chair and with RN and DO at end of session. P.T .will continue to follow throughout LOS. Recommending DC to home with initial 24/7 and home PT to progress activity tolerance. Treatment Diagnosis: Generalized weakness  Specific instructions for Next Treatment: Progress ther ex and mobility as tolerated  Prognosis: Good  Decision Making: Medium Complexity  PT Education: Goals; General Safety; PT Role; Plan of Care; Disease Specific Education; Functional Mobility Training; Precautions; Transfer Training; Energy Conservation; Equipment; Gait Training  Patient Education: pt educated on importance of OOB mobility, ambulation - demos understanding  REQUIRES PT FOLLOW UP: Yes  Activity Tolerance  Activity Tolerance: Patient Tolerated treatment well; Patient limited by endurance; Patient limited by fatigue  Activity Tolerance: 93%, 151/91, 115 bpm following activity     Patient Diagnosis(es): The primary encounter diagnosis was Acute renal failure, unspecified acute renal failure type ([address]  Diagnoses of Rapid atrial fibrillation ([address] Urinary tract infection with hematuria, site unspecified, Hypomagnesemia, and will transfer supine <-> sit with modified(I) - 11/04 min A  Short term goal 2: Pt will transfer sit <-> stand and bed>chair with modified(I)   - 11/04 CGA  Short term goal 3: Pt will ambulate x 100 feet without AD with supervision   - 11/04 min A rw 5'  Short term goal 4: By 11/04/21: Pt will tolerate 12-15 reps BLE exercise for strengthening, balance, and endurance   - 11/04 initiated  Patient Goals   Patient goals : \"To go home and get to feeling better\"    Plan    Plan  Times per week: 2-3  Times per day: Daily  Specific instructions for Next Treatment: Progress ther ex and mobility as tolerated  Current Treatment Recommendations: Strengthening, Balance Training, Endurance Training, Functional Mobility Training, Transfer Training, Gait Training, Home Exercise Program, Safety Education & Training, Equipment Evaluation, Education, & procurement, Stair training  Safety Devices  Type of devices: All fall risk precautions in place, Left in chair, Call light within reach, Nurse notified, Patient at risk for falls (with RN and DO at end of session in chair)     Therapy Time   Individual Concurrent Group Co-treatment   Time In 0826         Time Out 0919         Minutes 53         Timed Code Treatment Minutes: 48 Minutes     If pt is unable to be seen after this session, please let this note serve as discharge summary. Please see case management note for discharge disposition. Thank you.     Quin Oswald, PT

## 2021-11-08 NOTE — PROGRESS NOTES
Nephrology Progress  Note                                                                                                                                                                                                                                                                                                                                                               Office : [phone]     Fax :[phone]              Patient's Name: Joo Garrett  11:19 AM  11/8/2021    Reason for Consult:  Hyponatremia,  Requesting Physician:  Rosa Bailey MD      Chief Complaint:  Bary Collin, diarrhea      Interval History :     covid +  K 4.1  On lasix   Not in acute distress  Renal fn stable  S/p LHC on 11/5 : s/o elevated right side pressure , acute CHF  No diarrhea  No vomiting        History of Present Ilness:    Joo Garrett is a 79 y.o. female with PMH of HTN , hypothyroidism ,Afib     Presented to ED with c/o nausea  Vomiting and diarrhea   Onset last 4-5 days  C/o weakness    Nephrology consult for hyponatremia management      Past Medical History:   Diagnosis Date    Hypertension     Thyroid disease     hypothyroidism       Past Surgical History:   Procedure Laterality Date    HEMORRHOID SURGERY      HYSTERECTOMY      KNEE ARTHROSCOPY Right 3/10/2016    TONSILLECTOMY         History reviewed. No pertinent family history. reports that she quit smoking about 41 years ago. She has never used smokeless tobacco. She reports current alcohol use of about 12.0 - 14.0 standard drinks of alcohol per week. She reports that she does not use drugs.     Allergies:  Shellfish-derived products    Current Medications:    metoprolol succinate (TOPROL XL) extended release tablet 150 mg, BID  metoprolol succinate (TOPROL XL) extended release tablet 50 mg, Once  melatonin disintegrating tablet 5 mg, Nightly  psyllium (METAMUCIL) 58.12 % packet 1 packet, Daily  digoxin (LANOXIN) tablet 125 mcg, Daily  apixaban (ELIQUIS) tablet 5 mg, BID  furosemide (LASIX) tablet 20 mg, Daily  aspirin chewable tablet 81 mg, Daily  sodium chloride flush 0.9 % injection 5-40 mL, 2 times per day  sodium chloride flush 0.9 % injection 5-40 mL, PRN  0.9 % sodium chloride infusion, PRN  atorvastatin (LIPITOR) tablet 40 mg, Nightly  lisinopril (PRINIVIL;ZESTRIL) tablet 5 mg, Daily  perflutren lipid microspheres (DEFINITY) injection 1.65 mg, ONCE PRN  cefTRIAXone (ROCEPHIN) 2000 mg IVPB in D5W 50ml minibag, Q24H  calcium carbonate (TUMS) chewable tablet 500 mg, TID PRN  sodium chloride flush 0.9 % injection 10 mL, 2 times per day  sodium chloride flush 0.9 % injection 10 mL, PRN  0.9 % sodium chloride infusion, PRN  acetaminophen (TYLENOL) tablet 650 mg, Q6H PRN   Or  acetaminophen (TYLENOL) suppository 650 mg, Q6H PRN  glucose (GLUTOSE) 40 % oral gel 15 g, PRN  dextrose 50 % IV solution, PRN  glucagon (rDNA) injection 1 mg, PRN  dextrose 5 % solution, PRN  levothyroxine (SYNTHROID) tablet 100 mcg, Daily  sodium chloride flush 0.9 % injection 5-40 mL, 2 times per day  sodium chloride flush 0.9 % injection 5-40 mL, PRN  0.9 % sodium chloride infusion, PRN  ondansetron (ZOFRAN) injection 4 mg, Q1H PRN        Review of Systems:   14 point ROS obtained but were negative except mentioned in HPI      Physical exam:     Vitals:  BP (!) 151/91   Pulse 114   Temp 97.8 °F (36.6 °C) (Oral)   Resp 18   Ht 5' 6\" (1.676 m)   Wt 154 lb (69.9 kg)   SpO2 94%   BMI 24.86 kg/m²   Constitutional:  OAA X3 NAD  Skin: no rash, turgor wnl  Heent:  eomi, mmm  Neck: no bruits or jvd noted  Cardiovascular:  S1, S2 without m/r/g  Respiratory: CTA B without w/r/r  Abdomen:  +bs, soft, nt, nd  Ext: no  lower extremity edema  Psychiatric: mood and affect appropriate  Musculoskeletal:  Rom, muscular strength intact    Data:   Labs:  CBC:   Recent Labs     11/06/21  0451 11/07/21  0547 11/08/21  0507   WBC 17.8* 17.4* 18.8*   HGB 12.8 11.4* 11.7*    374 429 BMP:    Recent Labs     11/06/21  0451 11/07/21  0547 11/08/21  0507    135* 134*   K 4.1 3.3* 4.1   CL 99 97* 97*   CO2 30 29 27   BUN 19 15 12   CREATININE 0.9 0.8 0.8   GLUCOSE 122* 128* 107*     Ca/Mg/Phos:   Recent Labs     11/06/21  0451 11/07/21  0547 11/08/21  0507   CALCIUM 8.9 8.6 8.9   MG 2.20 1.80 2.00     Hepatic:   No results for input(s): AST, ALT, ALB, BILITOT, ALKPHOS in the last 72 hours. Troponin:   No results for input(s): TROPONINI in the last 72 hours. BNP: No results for input(s): BNP in the last 72 hours. Lipids: No results for input(s): CHOL, TRIG, HDL, LDLCALC, LABVLDL in the last 72 hours. ABGs: No results for input(s): PHART, PO2ART, ZJT9MOL in the last 72 hours. INR: No results for input(s): INR in the last 72 hours. UA:  No results for input(s): Dale Climes, GLUCOSEU, BILIRUBINUR, KETUA, SPECGRAV, BLOODU, PHUR, PROTEINU, UROBILINOGEN, NITRU, LEUKOCYTESUR, Blunt Buddhist in the last 72 hours. Urine Microscopic:   No results for input(s): LABCAST, BACTERIA, COMU, HYALCAST, WBCUA, RBCUA, EPIU in the last 72 hours. Urine Culture:   No results for input(s): LABURIN in the last 72 hours. Urine Chemistry:   No results for input(s): Nadeen Cheers, PROTEINUR, NAUR in the last 72 hours. IMAGING:  CT ABDOMEN PELVIS WO CONTRAST Additional Contrast? None   Final Result   1. No acute finding in the abdomen or pelvis. 2. Cholelithiasis and diverticulosis noted incidentally. 3. Trace pleural effusions in the lower chest with peripheral airspace   opacities. Correlate with any pulmonary symptoms. A developing viral   infection or pulmonary edema may be considered. XR CHEST PORTABLE   Final Result   Mild cardiomegaly and COPD. No other cardiopulmonary disease. Assessment/Plan   1.  Hyponatremia      Serum Na 122  -----> 126  ---> 134    Etiology could be hypovolemic hyponatremia due to vomiting , diarrhea   Poor solute intake VS SIADH    Volume status : hypovolemic side    Urine Na < 20     Plan    Serum Na 134    Cont lasix        Encourage high protein/solute diet        2. NAGMA 2/2 diarrhea    improved    3.  CKD stage 3    mallorie    Serum cr stable    Avoid NSAIDs      4 N/V/D    improved      5 Hypokalemia    Replace with KCL prn      6 Acute CHF    Cont lasix and ACEI    7 Hypomagnesemia    Replace with IV Mg    8 Covid 19 infection +    Management per primary team            Thank you for allowing us to participate in care of Rizwan Allison MD  Feel free to contact me   [facility] [address]  Office : [phone]  Fax :[phone]

## 2021-11-08 NOTE — PROGRESS NOTES
Hospitalist Progress Note      PCP: Adama Ng MD    Date of Admission: 10/30/2021    Chief Complaint: Nausea, fatigue and diarrhea     Subjective: Covid positive but asymptomatic      Medications:  Reviewed    Infusion Medications    sodium chloride      sodium chloride 25 mL (11/02/21 1244)    dextrose      sodium chloride       Scheduled Medications    melatonin  5 mg Oral Nightly    psyllium  1 packet Oral Daily    digoxin  125 mcg Oral Daily    metoprolol succinate  100 mg Oral BID    apixaban  5 mg Oral BID    furosemide  20 mg Oral Daily    aspirin  81 mg Oral Daily    sodium chloride flush  5-40 mL IntraVENous 2 times per day    atorvastatin  40 mg Oral Nightly    lisinopril  5 mg Oral Daily    cefTRIAXone (ROCEPHIN) IV  2,000 mg IntraVENous Q24H    sodium chloride flush  10 mL IntraVENous 2 times per day    levothyroxine  100 mcg Oral Daily    sodium chloride flush  5-40 mL IntraVENous 2 times per day     PRN Meds: sodium chloride flush, sodium chloride, perflutren lipid microspheres, calcium carbonate, sodium chloride flush, sodium chloride, acetaminophen **OR** acetaminophen, glucose, dextrose, glucagon (rDNA), dextrose, sodium chloride flush, sodium chloride, ondansetron      Intake/Output Summary (Last 24 hours) at 11/8/2021 0925  Last data filed at 11/8/2021 0017  Gross per 24 hour   Intake 1080 ml   Output --   Net 1080 ml       Physical Exam Performed:    BP (!) 151/91   Pulse 114   Temp 97.8 °F (36.6 °C) (Oral)   Resp 18   Ht 5' 6\" (1.676 m)   Wt 154 lb (69.9 kg)   SpO2 94%   BMI 24.86 kg/m²     General appearance: No apparent distress, appears stated age and cooperative. HEENT: Pupils equal, round, and reactive to light. Conjunctivae/corneas clear. Neck: Supple, with full range of motion. No jugular venous distention. Trachea midline. Respiratory:  Normal respiratory effort. Clear to auscultation, bilaterally without Rales/Wheezes/Rhonchi.   Cardiovascular: Regular rate and rhythm with normal S1/S2 without murmurs, rubs or gallops. Abdomen: Soft, non-tender, non-distended with normal bowel sounds. Musculoskeletal: No clubbing, cyanosis or edema bilaterally. Full range of motion without deformity. Skin: Skin color, texture, turgor normal.  No rashes or lesions. Neurologic:  Neurovascularly intact without any focal sensory/motor deficits. Cranial nerves: II-XII intact, grossly non-focal.  Psychiatric: Alert and oriented, thought content appropriate, normal insight  Capillary Refill: Brisk,< 3 seconds   Peripheral Pulses: +2 palpable, equal bilaterally       Labs:   Recent Labs     11/06/21 0451 11/07/21  0547 11/08/21  0507   WBC 17.8* 17.4* 18.8*   HGB 12.8 11.4* 11.7*   HCT 37.9 33.6* 33.9*    374 429     Recent Labs     11/06/21 0451 11/07/21  0547 11/08/21  0507    135* 134*   K 4.1 3.3* 4.1   CL 99 97* 97*   CO2 30 29 27   BUN 19 15 12   CREATININE 0.9 0.8 0.8   CALCIUM 8.9 8.6 8.9     No results for input(s): AST, ALT, BILIDIR, BILITOT, ALKPHOS in the last 72 hours. No results for input(s): INR in the last 72 hours. No results for input(s): Vaibhav Shorts in the last 72 hours.     Urinalysis:      Lab Results   Component Value Date    NITRU Negative 10/30/2021    WBCUA >100 10/30/2021    BACTERIA 3+ 10/30/2021    RBCUA see below 10/30/2021    BLOODU MODERATE 10/30/2021    SPECGRAV 1.020 10/30/2021    GLUCOSEU Negative 10/30/2021       Consults:    IP CONSULT TO HOSPITALIST  IP CONSULT TO NEPHROLOGY  IP CONSULT TO HOME CARE NEEDS  IP CONSULT TO HEART FAILURE NURSE/COORDINATOR  IP CONSULT TO CARDIOLOGY      Assessment/Plan:    Active Hospital Problems    Diagnosis     Rapid atrial fibrillation ([facility] [I48.91]     Urinary tract infection with hematuria [N39.0, R31.9]     PVC (premature ventricular contraction) [I49.3]     Hypokalemia [E87.6]     Cardiomyopathy ([facility] [I42.9]     Acute systolic heart failure ([facility] [I50.21]     Sepsis ([facility] [A41.9]          Acute E. coli cystitis with Bacteremia   - WBC has decreased to 17.8, could also be reactive  - Symptomatically improving   - CXR showed no acute process & Covid negative  - Blood cultures x2 grew E. Coli , pan sensitive with resistance to bactrim   - Urine culture showed E. coli   - Past abx regimen: Vancomycin x1 on 10/30 and Zosyn (3 days)   - Continue 2 g of ceftriaxone   - Repeat blood culture NGTD      Acute Biventricular Systolic HF HFrEF- newly diagnosed this admission  - Echo demonstrated decreased EF to 35-40% and left atrium is severely dilated. Previous Echo completed at [facility] on 6/2020 had EF of 52-57%   - Negative EKG and troponins on admission  - BNP trending down from recent rise  - Strict I/Os   - Daily weights   - Encouraged incentive spirometry use   - Cardiology consulted and recommended:              - Continue lisinopril, metoprolol, po lasix                - Will add digoxin 125 mcg if HR uncontrolled after AM meds               - Considering ROBERTO and DCCV once recovered from infection     Non-obstructive CAD  - LHC/RHC completed 11/5: Non-obstructive CAD, non-ischemic cardiomyopathy, mild pulmonary hypertension  -Continue aspirin, statin, lisinopril      Electrolyte disturbance   - Nephrology following and assisting with management  - Cardiology would like K > 4, Mg > 2  - Replaced K with 2 doses of 40 mEq of potassium chloride today  - Replace Mg PRN  - Will continue to monitor      A. Fib with RVR   - Per cardiology, severely dilated left atrium. coreg has been changed to metoprolol for better rate control  - Continue metoprolol   - Continue Eliquis BID      Constipation  -Restarted home bowel regimen of daily metamucil      SHAY -Resolved    - Etiology unknown.  Likely secondary to dehydration, sepsis  - Cr 0.8 from 1.7 on admission  - Nephrology following and assisting with management  - Will continue to monitor      Hyperglycemia W/o Diabetes   - A1C of 5.7%   - Will continue to monitor      Hypothyroidism  -Continue home levothyroxine        DVT Prophylaxis: Eliquis     Recent Labs     11/06/21  0451 11/07/21  0547 11/08/21  0507    374 429     Diet: Diet NPO  Code Status: Full Code      PT/OT Eval Status: Seen w/ recs for home w/ assist    Dispo - Likely to home w/ [facility] [address] Tues/Wed 9/10 Nov pending clinical cours and subspecialty recs.       Rod Hutton MD

## 2021-11-09 VITALS
HEIGHT: 66 IN | BODY MASS INDEX: 24.23 KG/M2 | DIASTOLIC BLOOD PRESSURE: 93 MMHG | WEIGHT: 150.8 LBS | SYSTOLIC BLOOD PRESSURE: 151 MMHG | RESPIRATION RATE: 19 BRPM | TEMPERATURE: 95.3 F | OXYGEN SATURATION: 96 % | HEART RATE: 84 BPM

## 2021-11-09 PROBLEM — U07.1 COVID-19: Status: ACTIVE | Noted: 2021-11-09

## 2021-11-09 LAB
ANION GAP SERPL CALCULATED.3IONS-SCNC: 11 MMOL/L (ref 3–16)
ANISOCYTOSIS: ABNORMAL
BANDED NEUTROPHILS RELATIVE PERCENT: 17 % (ref 0–7)
BASOPHILS ABSOLUTE: 0.2 K/UL (ref 0–0.2)
BASOPHILS RELATIVE PERCENT: 1 %
BUN BLDV-MCNC: 13 MG/DL (ref 7–20)
CALCIUM SERPL-MCNC: 8.6 MG/DL (ref 8.3–10.6)
CHLORIDE BLD-SCNC: 95 MMOL/L (ref 99–110)
CO2: 27 MMOL/L (ref 21–32)
CREAT SERPL-MCNC: 0.8 MG/DL (ref 0.6–1.2)
DOHLE BODIES: PRESENT
EOSINOPHILS ABSOLUTE: 0.2 K/UL (ref 0–0.6)
EOSINOPHILS RELATIVE PERCENT: 1 %
GFR AFRICAN AMERICAN: >60
GFR NON-AFRICAN AMERICAN: >60
GLUCOSE BLD-MCNC: 106 MG/DL (ref 70–99)
HCT VFR BLD CALC: 35.2 % (ref 36–48)
HEMOGLOBIN: 12 G/DL (ref 12–16)
LYMPHOCYTES ABSOLUTE: 1.5 K/UL (ref 1–5.1)
LYMPHOCYTES RELATIVE PERCENT: 9 %
MACROCYTES: ABNORMAL
MAGNESIUM: 1.9 MG/DL (ref 1.8–2.4)
MCH RBC QN AUTO: 30.2 PG (ref 26–34)
MCHC RBC AUTO-ENTMCNC: 34.2 G/DL (ref 31–36)
MCV RBC AUTO: 88.4 FL (ref 80–100)
MONOCYTES ABSOLUTE: 1 K/UL (ref 0–1.3)
MONOCYTES RELATIVE PERCENT: 6 %
MYELOCYTE PERCENT: 2 %
NEUTROPHILS ABSOLUTE: 13.9 K/UL (ref 1.7–7.7)
NEUTROPHILS RELATIVE PERCENT: 64 %
PDW BLD-RTO: 13.8 % (ref 12.4–15.4)
PLATELET # BLD: 434 K/UL (ref 135–450)
PMV BLD AUTO: 7.1 FL (ref 5–10.5)
POC ACT LR: 264 SEC
POIKILOCYTES: ABNORMAL
POLYCHROMASIA: ABNORMAL
POTASSIUM SERPL-SCNC: 3.9 MMOL/L (ref 3.5–5.1)
RBC # BLD: 3.98 M/UL (ref 4–5.2)
SODIUM BLD-SCNC: 133 MMOL/L (ref 136–145)
TOXIC GRANULATION: PRESENT
WBC # BLD: 16.7 K/UL (ref 4–11)

## 2021-11-09 PROCEDURE — 97530 THERAPEUTIC ACTIVITIES: CPT

## 2021-11-09 PROCEDURE — 99232 SBSQ HOSP IP/OBS MODERATE 35: CPT | Performed by: HOSPITALIST

## 2021-11-09 PROCEDURE — 97535 SELF CARE MNGMENT TRAINING: CPT

## 2021-11-09 PROCEDURE — 36415 COLL VENOUS BLD VENIPUNCTURE: CPT

## 2021-11-09 PROCEDURE — 6370000000 HC RX 637 (ALT 250 FOR IP): Performed by: STUDENT IN AN ORGANIZED HEALTH CARE EDUCATION/TRAINING PROGRAM

## 2021-11-09 PROCEDURE — 6370000000 HC RX 637 (ALT 250 FOR IP): Performed by: INTERNAL MEDICINE

## 2021-11-09 PROCEDURE — 99232 SBSQ HOSP IP/OBS MODERATE 35: CPT | Performed by: NURSE PRACTITIONER

## 2021-11-09 PROCEDURE — 80048 BASIC METABOLIC PNL TOTAL CA: CPT

## 2021-11-09 PROCEDURE — 2580000003 HC RX 258: Performed by: INTERNAL MEDICINE

## 2021-11-09 PROCEDURE — 6360000002 HC RX W HCPCS: Performed by: INTERNAL MEDICINE

## 2021-11-09 PROCEDURE — 83735 ASSAY OF MAGNESIUM: CPT

## 2021-11-09 PROCEDURE — 85025 COMPLETE CBC W/AUTO DIFF WBC: CPT

## 2021-11-09 RX ORDER — ATORVASTATIN CALCIUM 40 MG/1
40 TABLET, FILM COATED ORAL NIGHTLY
Qty: 30 TABLET | Refills: 0 | Status: SHIPPED | OUTPATIENT
Start: 2021-11-09 | End: 2021-12-15 | Stop reason: SDUPTHER

## 2021-11-09 RX ORDER — METOPROLOL SUCCINATE 50 MG/1
150 TABLET, EXTENDED RELEASE ORAL 2 TIMES DAILY
Qty: 180 TABLET | Refills: 0 | Status: SHIPPED | OUTPATIENT
Start: 2021-11-09 | End: 2021-11-17 | Stop reason: ALTCHOICE

## 2021-11-09 RX ORDER — CEFUROXIME AXETIL 500 MG/1
500 TABLET ORAL 2 TIMES DAILY
Qty: 10 TABLET | Refills: 0 | Status: SHIPPED | OUTPATIENT
Start: 2021-11-09 | End: 2021-11-14

## 2021-11-09 RX ORDER — FUROSEMIDE 20 MG/1
20 TABLET ORAL DAILY
Qty: 30 TABLET | Refills: 0 | Status: SHIPPED | OUTPATIENT
Start: 2021-11-10 | End: 2021-12-10 | Stop reason: SDUPTHER

## 2021-11-09 RX ORDER — MAGNESIUM SULFATE IN WATER 40 MG/ML
2000 INJECTION, SOLUTION INTRAVENOUS ONCE
Status: COMPLETED | OUTPATIENT
Start: 2021-11-09 | End: 2021-11-09

## 2021-11-09 RX ORDER — LISINOPRIL 10 MG/1
10 TABLET ORAL DAILY
Qty: 30 TABLET | Refills: 0 | Status: SHIPPED | OUTPATIENT
Start: 2021-11-10 | End: 2021-11-17 | Stop reason: CLARIF

## 2021-11-09 RX ORDER — ASPIRIN 81 MG/1
81 TABLET, CHEWABLE ORAL DAILY
Qty: 30 TABLET | Refills: 0 | Status: SHIPPED | OUTPATIENT
Start: 2021-11-10 | End: 2021-12-15 | Stop reason: SDUPTHER

## 2021-11-09 RX ORDER — POTASSIUM CHLORIDE 20 MEQ/1
40 TABLET, EXTENDED RELEASE ORAL ONCE
Status: COMPLETED | OUTPATIENT
Start: 2021-11-09 | End: 2021-11-09

## 2021-11-09 RX ORDER — DIGOXIN 125 MCG
125 TABLET ORAL DAILY
Qty: 30 TABLET | Refills: 0 | Status: SHIPPED | OUTPATIENT
Start: 2021-11-10 | End: 2021-12-10 | Stop reason: SDUPTHER

## 2021-11-09 RX ADMIN — LISINOPRIL 10 MG: 10 TABLET ORAL at 10:09

## 2021-11-09 RX ADMIN — MAGNESIUM SULFATE HEPTAHYDRATE 2000 MG: 40 INJECTION, SOLUTION INTRAVENOUS at 11:20

## 2021-11-09 RX ADMIN — POTASSIUM CHLORIDE 40 MEQ: 20 TABLET, EXTENDED RELEASE ORAL at 10:09

## 2021-11-09 RX ADMIN — SODIUM CHLORIDE, PRESERVATIVE FREE 10 ML: 5 INJECTION INTRAVENOUS at 10:08

## 2021-11-09 RX ADMIN — LEVOTHYROXINE SODIUM 100 MCG: 0.1 TABLET ORAL at 06:28

## 2021-11-09 RX ADMIN — APIXABAN 5 MG: 5 TABLET, FILM COATED ORAL at 10:09

## 2021-11-09 RX ADMIN — ASPIRIN 81 MG: 81 TABLET, CHEWABLE ORAL at 10:21

## 2021-11-09 RX ADMIN — FUROSEMIDE 20 MG: 20 TABLET ORAL at 10:09

## 2021-11-09 RX ADMIN — PSYLLIUM HUSK 1 PACKET: 3.4 POWDER ORAL at 10:08

## 2021-11-09 RX ADMIN — DIGOXIN 125 MCG: 125 TABLET ORAL at 10:09

## 2021-11-09 RX ADMIN — METOPROLOL SUCCINATE 150 MG: 50 TABLET, EXTENDED RELEASE ORAL at 10:09

## 2021-11-09 ASSESSMENT — PAIN SCALES - GENERAL
PAINLEVEL_OUTOF10: 0

## 2021-11-09 NOTE — PROGRESS NOTES
Occupational Therapy  Facility/Department: [address] [place] [address] PCU  Daily Treatment Note/Discharge Summary  NAME: Sanjay Clay  : 1940  MRN: [id number]    Date of Service: 2021    Discharge Recommendations:  Home with assist PRN       Assessment   Assessment: Pt demos good progress toward OT goals, meeting 3/3 goals this date. Pt I for all mobility and transfers in room. Pt reports completing ADLs I without nursing staff. Pt steady with no LOB during mobility and standing ADLs. Per discussion with both pt and Marky Bañuelos, OT will d/c acute OT services. Recommend pt return home with A PRN. Prognosis: Good  OT Education: OT Role; Plan of Care  Disease Specific Education: Pt educated on importance of OOB mobility, prevention of complications of bedrest, and general safety during hospitalization. Pt verbalized understanding. Barriers to Learning: none perceived  REQUIRES OT FOLLOW UP: No  Activity Tolerance  Activity Tolerance: Patient Tolerated treatment well  Activity Tolerance: Vitals: BP= 151/93, HR= 86, SPO2= 95% RA  Safety Devices  Safety Devices in place: Yes  Type of devices: Nurse notified; Call light within reach; Left in chair         Patient Diagnosis(es): The primary encounter diagnosis was Acute renal failure, unspecified acute renal failure type ([address] Diagnoses of Rapid atrial fibrillation ([address] Urinary tract infection with hematuria, site unspecified, Hypomagnesemia, and Nausea, vomiting, and diarrhea were also pertinent to this visit. has a past medical history of COVID-19, Hypertension, and Thyroid disease. has a past surgical history that includes Hemorrhoid surgery; Hysterectomy; Tonsillectomy; and Knee arthroscopy (Right, 3/10/2016).     Restrictions  Restrictions/Precautions  Restrictions/Precautions: General Precautions, Fall Risk, Contact Precautions, Isolation  Position Activity Restriction  Other position/activity restrictions: Droplet+ for COVID+     Subjective   General  Chart Reviewed: Yes  Patient assessed for rehabilitation services?: Yes  Additional Pertinent Hx: Pt admitted to ED with c/o nausea and vomiting. Admitted for ARF and treatment for renal failure. COVID (-). PMHx includes:  Hypertension and Thyroid disease. Response to previous treatment: Patient with no complaints from previous session  Family / Caregiver Present: No  Referring Practitioner: Alyson Hernandez DO 11/01/21  Diagnosis: hypomagnesemia    Subjective  Subjective: Pt resting in bed, agreeable to OT treatment. Vital Signs  Patient Currently in Pain: Denies     Orientation  Orientation  Overall Orientation Status: Within Functional Limits     Objective    ADL  Grooming: Independent  LE Dressing: Independent (socks)  Toileting: Independent     Balance  Sitting Balance: Independent  Standing Balance: Independent  Standing Balance  Activity: functional/bathroom mobility, toileting, sink ADLs    Functional Mobility  Functional - Mobility Device: No device  Activity: To/from bathroom; Other  Assist Level: Independent    Bed mobility  Supine to Sit: Modified independent     Transfers  Sit to stand: Independent  Stand to sit:  Independent     Cognition  Overall Cognitive Status: [facility]     Plan   Plan  Times per week: 3-5x's a week while in acute care    AM-PAC Score  AM-[facility] Inpatient Daily Activity Raw Score: 24 (11/09/21 1409)  AM-PAC Inpatient ADL T-Scale Score : 57.54 (11/09/21 1409)  ADL Inpatient CMS 0-100% Score: 0 (11/09/21 1409)  ADL Inpatient CMS G-Code Modifier : [address] (11/09/21 1409)    Goals  Short term goals  Time Frame for Short term goals: 1 week unless otherwise specified 11/9  Short term goal 1: Pt will complete LE dressing with CGA-- GOAL MET, pt demos I for socks and simulated pants with brief 11/9/21  Short term goal 2: Pt will complete toilet transfers with SBA by 11/06-- pt not seen since 11/4, GOAL MET 11/9/21 pt demos I toilet transfer  Short term goal 3: Pt will complete standing level ADLs with SBA-- GOAL MET, pt vishal FIGUEREDO 11/9/21  Patient Goals   Patient goals : Alexnadja Hughes be able to get up and go home\"       Therapy Time   Individual Concurrent Group Co-treatment   Time In 1415         Time Out 1509         Minutes [address] [place] [address], FADUMO/L

## 2021-11-09 NOTE — PROGRESS NOTES
non-tender, non-distended with normal bowel sounds. Musculoskeletal: No clubbing, cyanosis or edema bilaterally. Full range of motion without deformity. Skin: Skin color, texture, turgor normal.  No rashes or lesions. Neurologic:  Neurovascularly intact without any focal sensory/motor deficits. Cranial nerves: II-XII intact, grossly non-focal.  Psychiatric: Alert and oriented, thought content appropriate, normal insight  Capillary Refill: Brisk,< 3 seconds   Peripheral Pulses: +2 palpable, equal bilaterally       Labs:   Recent Labs     11/07/21  0547 11/08/21 0507 11/09/21 0451   WBC 17.4* 18.8* 16.7*   HGB 11.4* 11.7* 12.0   HCT 33.6* 33.9* 35.2*    429 434     Recent Labs     11/07/21 0547 11/08/21 0507 11/09/21 0451   * 134* 133*   K 3.3* 4.1 3.9   CL 97* 97* 95*   CO2 29 27 27   BUN 15 12 13   CREATININE 0.8 0.8 0.8   CALCIUM 8.6 8.9 8.6     No results for input(s): AST, ALT, BILIDIR, BILITOT, ALKPHOS in the last 72 hours. No results for input(s): INR in the last 72 hours. No results for input(s): Vaibhav Shorts in the last 72 hours.     Urinalysis:      Lab Results   Component Value Date    NITRU Negative 10/30/2021    WBCUA >100 10/30/2021    BACTERIA 3+ 10/30/2021    RBCUA see below 10/30/2021    BLOODU MODERATE 10/30/2021    SPECGRAV 1.020 10/30/2021    GLUCOSEU Negative 10/30/2021       Consults:    IP CONSULT TO HOSPITALIST  IP CONSULT TO NEPHROLOGY  IP CONSULT TO HOME CARE NEEDS  IP CONSULT TO HEART FAILURE NURSE/COORDINATOR  IP CONSULT TO CARDIOLOGY      Assessment/Plan:    Active Hospital Problems    Diagnosis     COVID-19 [U07.1]     Rapid atrial fibrillation ([facility] [I48.91]     Urinary tract infection with hematuria [N39.0, R31.9]     PVC (premature ventricular contraction) [I49.3]     Hypokalemia [E87.6]     Cardiomyopathy ([facility] [I42.9]     Acute systolic heart failure (HCC) [I50.21]     Sepsis ([facility] [A41.9]          Acute E. coli cystitis with Bacteremia   - WBC has decreased to 17.8, could also be reactive  - Symptomatically improving   - CXR showed no acute process & Covid negative  - Blood cultures x2 grew E. Coli , pan sensitive with resistance to bactrim   - Urine culture showed E. coli   - Past abx regimen: Vancomycin x1 on 10/30 and Zosyn (3 days)   - Continue high dose Rocephin started on or before 2 November. Anticipate PO ABX at discharge.  - Repeat blood culture NGTD      Acute Biventricular Systolic HF HFrEF- newly diagnosed this admission  - Echo demonstrated decreased EF to 35-40% and left atrium is severely dilated. Previous Echo completed at [facility] on 6/2020 had EF of 52-57%   - Cardiology consulted and recommended:              - Continue lisinopril, metoprolol, po lasix                - Added Digoxin               - Considering ROBERTO and DCCV once recovered from infection     Non-obstructive CAD  - LHC/RHC completed 11/5: Non-obstructive CAD, non-ischemic cardiomyopathy, mild pulmonary hypertension  -Continue aspirin, statin, lisinopril      Electrolyte disturbance   - Nephrology following and assisting with management  - Cardiology would like K > 4, Mg > 2     A. Fib with RVR   - Per cardiology, severely dilated left atrium. coreg has been changed to metoprolol for better rate control  - Continue metoprolol   - Continue Eliquis BID      Constipation  -Restarted home bowel regimen of daily metamucil      SHAY - resolved    - Etiology unknown. Likely secondary to dehydration, sepsis  - Cr 0.8 from 1.7 on admission  - Nephrology following and assisting with management       Hyperglycemia W/o Diabetes   - A1C of 5.7%   - Will continue to monitor      HypoThyroid - clinically euthyroid on oral replacement therapy. Continue, w/ outpt monitoring as previously arranged.        DVT Prophylaxis: Eliquis     Recent Labs     11/07/21  0547 11/08/21  0507 11/09/21  0451    429 434     Diet: ADULT DIET;  Regular  ADULT ORAL NUTRITION SUPPLEMENT; Breakfast, Lunch, Dinner, AM Snack; Standard High Calorie/High Protein Oral Supplement  Code Status: Full Code      PT/OT Eval Status: Seen w/ recs for home w/ assist    Dispo - Likely to home w/ [facility] Tues/Wed 9/10 Nov pending clinical cours and subspecialty recs.       Amanda Donis MD

## 2021-11-09 NOTE — PROGRESS NOTES
[facility]   Daily Progress Note    Admit Date:  10/30/2021  HPI:    Chief Complaint   Patient presents with    Nausea     pt to ED with nausea, fatigue, weakness, diarrhea, for three days. pt given zofran 4 mg PIV by EMS. pt from home.  Fatuma Olmos was admitted with E. coli UTI, bacteremia and sepsis. Cardiology consulted for atrial fibrillation and CHF    History of nonischemic cardiomyopathy, mitral regurg, tobacco abuse. LVEF this admission down to 35 to 40%. Had planned for ROBERTO/DCCV on 11/8/2021, but COVID-19 NAAT returned positive. Procedure canceled and now and isolation. Subjective:  Ms. Amina Enamorado seen from door, lying nearly flat in bed talking on telephone. Reviewed with nurse patient has been stable. Objective:   Patient Vitals for the past 24 hrs:   BP Temp Temp src Pulse Resp SpO2 Weight   11/09/21 0958 (!) 147/91 97.6 °F (36.4 °C) Oral 90 16 95 % --   11/09/21 0600 -- -- -- 91 -- 92 % 150 lb 12.8 oz (68.4 kg)   11/09/21 0554 (!) 155/77 97.8 °F (36.6 °C) Oral 80 12 93 % --   11/08/21 2248 (!) 150/88 97.5 °F (36.4 °C) Oral 97 17 -- --   11/08/21 2247 -- -- -- -- -- 95 % --   11/08/21 2015 (!) 135/90 98 °F (36.7 °C) Oral 94 18 93 % --   11/08/21 1627 139/89 97.6 °F (36.4 °C) Oral 74 17 96 % --   11/08/21 1129 (!) 163/89 98 °F (36.7 °C) Oral 87 18 95 % --       Intake/Output Summary (Last 24 hours) at 11/9/2021 1050  Last data filed at 11/9/2021 0600  Gross per 24 hour   Intake 360 ml   Output --   Net 360 ml     Wt Readings from Last 3 Encounters:   11/09/21 150 lb 12.8 oz (68.4 kg)   10/27/21 154 lb (69.9 kg)   09/21/18 150 lb (68 kg)         ASSESSMENT:   1. CHF, acute biventricular systolic: Weight down 4 pounds, no documented urine output, oxygenation stable, BNP decreasing  2. Cardiomyopathy: Felt to be tachycardia mediated, on Toprol-XL and lisinopril, EF 35-40%  3.  Atrial fibrillation, persistent: Rate has been difficult to control, now on Toprol  mg twice daily and digoxin 1 2 5 mcg daily, heart rate now  bpm  4. Pulmonary hypertension: PASP 44 by echo, 40 by RHC, normal right heart pressures, PVR 2.9 Wood units, felt to be group II  5. CAD: non-obstructive, on ASA and statin  6. MR  7. UIT/ Bacteremia with sepsis  8. COVID 19 infection:  9. PLAN:  1. Continue Eliquis for anticoagulation for atrial fibrillation  2. Continue current dose of Toprol- mg twice daily as well as digoxin 125 mcg daily. Continue to monitor heart rate and may increase dose to 200 mg twice daily if needed  3. Continue current dose of lisinopril 10 mg daily for cardiomyopathy and hypertension  4. Continue aspirin and statin for nonobstructive CAD  5. Continue Lasix 20 mg p.o. daily for CHF  6. Okay for discharge from a cardiac perspective. Patient has appointment scheduled with Dr. Leoncio Pierre on 11/19/2021. CECILIA Hall - CNP, 11/9/2021, 10:50 AM  [facility]   [phone]       Telemetry: AFib  bpm (overall decrease in rates over past 24 hours)  NYHA: III    Physical Exam:  Due to the current efforts to prevent transmission of COVID-19 and also the need to preserve PPE for other caregivers, a face-to-face encounter with the patient was not performed. That being said, all relevant records and diagnostic tests were reviewed, including laboratory results and imaging. Please reference any relevant documentation elsewhere. Care will be coordinated with the primary service.        Medications:    magnesium sulfate  2,000 mg IntraVENous Once    metoprolol succinate  150 mg Oral BID    lisinopril  10 mg Oral Daily    melatonin  5 mg Oral Nightly    psyllium  1 packet Oral Daily    digoxin  125 mcg Oral Daily    apixaban  5 mg Oral BID    furosemide  20 mg Oral Daily    aspirin  81 mg Oral Daily    sodium chloride flush  5-40 mL IntraVENous 2 times per day    atorvastatin  40 mg Oral Nightly    cefTRIAXone (ROCEPHIN) IV  2,000 mg IntraVENous Q24H    sodium chloride flush  10 mL IntraVENous 2 times per day    levothyroxine  100 mcg Oral Daily    sodium chloride flush  5-40 mL IntraVENous 2 times per day      sodium chloride      sodium chloride 25 mL (11/02/21 1244)    dextrose      sodium chloride         Lab Data: Lab results independently reviewed and analyzed by myself 11/9/21   CBC:   Recent Labs     11/07/21  0547 11/08/21  0507 11/09/21  0451   WBC 17.4* 18.8* 16.7*   HGB 11.4* 11.7* 12.0    429 434     BMP:    Recent Labs     11/07/21  0547 11/08/21  0507 11/09/21  0451   * 134* 133*   K 3.3* 4.1 3.9   CO2 29 27 27   BUN 15 12 13   CREATININE 0.8 0.8 0.8     INR:  No results for input(s): INR in the last 72 hours. BNP:    Recent Labs     11/07/21  0547   PROBNP 5,008*     Cardiac Enzymes: No results for input(s): TROPONINI in the last 72 hours. Lipids: No results found for: TRIG, HDL, LDLCALC, LDLDIRECT    Cardiac Imaging:   CARDIAC CATH 11/5/2021  Findings:  1. Hemodynamics:  A. Right heart catheterization                   1. RA: 8 mmHg                   2. RV: 42/8 mmHg                   3. PA: 40/24 (30) mmHg                   4. PCWP: 17 mmHg                   5. Saturations: AO 92%, RA 59%, PA 56%                   6. Danish CO: 4.48 L/min                   7. Danish CI: 2.43 L/min*m2                   8. Danish SVR: 1,625 dyne*sec/cm5                   9. Danish PVR: 232 dyne*sec/cm5              B. Opening arterial pressure: 134/76 (99) mmHg              C. LVEDP: 15 mmHg   2. Coronary anatomy:  A. Left main artery: The left main artery bifurcates into the left anterior descending artery and left circumflex artery. The left main artery has minor luminal irregularities. .  B. Left anterior descending artery: Transapical vessel which gives rise to 2 small diagonal arteries. The LAD has a 20% ostial stenosis, eccentric 40% proximal stenosis, and 30% mid-vessel stenosis.   C. Left circumflex artery: Non-dominant vessel that gives rise to 3 obtuse marginal arteries and a posterolateral branch. The LCx has a 20% ostial stenosis. The remainder of the vessel is relatively free of disease. The OM1 is a very small vessel with a high origin. The OM2 is a large branching vessel with no angiographically significant disease. The left posterolateral branch has minor luminal irregularities. D. Right coronary artery: Dominant vessel. The RCA is medium caliber and has minor luminal irregularities. The right posterior descending artery has minor luminal irregularities. The right posterolateral branch has minor luminal irregularities. Impression:  1. Non-obstructive coronary artery disease. 2. Non-ischemic cardiomyopathy. 3. Acute biventricular systolic heart failure with mildly elevated left and right-sided cardiac filling pressures. 4. Mild pulmonary hypertension. 5. Preserved Danish cardiac output and index. 6. Persistent atrial fibrillation. Plan:  1. Restart Eliquis 5 mg BID tomorrow. 2. Start aspirin 81 mg daily and atorvastatin 40 mg daily. 3. Increase metoprolol succinate to 100 mg BID for improved ventricular rate control. 4. Continue lisinopril 5 mg daily. 5. Transition from IV to PO lasix 20 mg daily. 6. Monitor strict I/Os, obtain daily standing weights. 7. 2L per day fluid restriction, low sodium diet. 8. Maintain K>4 and Mg>2. TTE 11/3/21:   Technically difficult examination. Global left ventricular function is moderately decreased with ejection fraction estimated at 35-40%. EF by Medina's method estimated at 35%. Regional wall motion abnormalities cannot be ruled out due to irregular heart rate. Diastolic dysfunction grade and filing pressure are indeterminate. The left atrium is severely dilated. Right ventricular systolic function is reduced . Aortic valve appears sclerotic but opens adequately.    Mitral annular calcification is present, more prominent on the posterior mitral valve leaflet. Mild to moderate (posteriorly eccentric) mitral regurgitation. Mild pulmonic regurgitation. Moderate tricuspid valve regurgitation. Trace aortic valve regurgitation. Systolic pulmonary artery pressure (SPAP) estimated at 44 mmHg (right atrial pressure 3 mmHg), consistent with mild pulmonary hypertension. Irregular heart rate throughout study. TTE 6/23/20:  Study Conclusions   - Left ventricle: The cavity size is normal. There is mild focal basal hypertrophy. There is hypertrophy of the septum. Systolic function was normal. The estimated ejection fraction was in the range of 52% to 57%. Wall motion was normal; there were no regional wall motion abnormalities. Features are consistent with a pseudonormal left ventricular filling pattern, with concomitant abnormal relaxation and increased filling pressure (grade 2 diastolic dysfunction). The global longitudinal strain was -22%. - Aortic valve: Thickening, consistent with sclerosis. - Mitral valve: Leaflet separation was reduced. Mobility was restricted. There was mild to moderate regurgitation.   - Left atrium: The atrium is markedly dilated. - Inferior vena cava: The vessel was normal in size; the respirophasic diameter changes were in the normal range (&gt;= 50%); findings are consistent with normal central venous pressure. [facility] 4/6/05:  FINDINGS:   CORONARY ARTERIOGRAPHY: Coronary arteriography of this co-dominant system revealed no significant stenoses of the LMCA, LAD, left circumflex or RCA. LEFT VENTRICULOGRAPHY: Left ventriculogram in the RANGEL projection in the setting of ectopy revealed normal left ventricular function with an estimated ejection fraction of greater then 50%  and trace mitral regurgitation. IMPRESSION: No significant coronary artery disease, probably normal left ventricular function. PLAN:   Medical therapy. We will arrange a MUGA to definitively assess LV function. TTE 3/2/05:  RESULTS:    1. Technically adequate study. 2.  Left ventricular function is abnormal.  There are septal and lateral hypokinesis. Estimatedejection fraction is 40% to 45%. 3.  There is no chamber enlargement. There is no ventricular hypertrophy. The right sided chambers are not enlarged. There is mild left atrial enlargement. 4.  The aortic, mitral, tricuspid and pulmonary valves function adequately. There is no evidence of valvular stenosis or prolapse. 5.  No thrombus, mass or effusions were seen. 6.  Doppler interrogation revealed normal antegrade velocities. The mean gradient across the aortic valve is 4.6 mm Hg. Color Doppler demonstrated mild mitral and tricuspid regurgitation. Right ventricular systolic pressure was 25 mm Hg.

## 2021-11-09 NOTE — DISCHARGE INSTR - COC
Continuity of Care Form    Patient Name: Jennie Modi   :    MRN:  [id number]    Admit date:  10/30/2021  Discharge date:  2021    Code Status Order: Full Code   Advance Directives:      Admitting Physician:  Jeana Rosa DO  PCP: Isom Kayser, MD    Discharging Nurse: [facility] Unit/Room#: 6649/4410-69  Discharging Unit Phone Number: ***    Emergency Contact:   Extended Emergency Contact Information  Primary Emergency Contact: John Paul Ellison  Address: [address] [place] [address] Phone: [phone]  Mobile Phone: [phone]  Relation: Spouse  Secondary Emergency Contact: [facility] Phone: [phone]  Work Phone: [phone]  Relation: Child    Past Surgical History:  Past Surgical History:   Procedure Laterality Date    HEMORRHOID SURGERY      HYSTERECTOMY      KNEE ARTHROSCOPY Right 3/10/2016    TONSILLECTOMY         Immunization History:   Immunization History   Administered Date(s) Administered    COVID-19, Kellee Mccain, Primary or Immunocompromised, PF, 100mcg/0.5mL 2021, 2021       Active Problems:  Patient Active Problem List   Diagnosis Code    Localized osteoarthrosis, lower leg M17.10    Tear of lateral cartilage or meniscus of knee, current S83.289A    Tear of medial meniscus of right knee S83.241A    Primary osteoarthritis of right knee M17.11    Sepsis ([address] A41.9    Cardiomyopathy ([address] O52.8    Acute systolic heart failure (HCC) I50.21    Dyspnea R06.00    Rapid atrial fibrillation (HCC) I48.91    Urinary tract infection with hematuria N39.0, R31.9    PVC (premature ventricular contraction) I49.3    Hypokalemia E87.6    COVID-19 U07.1    Acute renal failure ([address] N17.9    Persistent atrial fibrillation ([address] I48.19    Pulmonary hypertension ([address] I27.20       Isolation/Infection:   Isolation            Droplet Plus          Patient Infection Status       Infection Onset Added Last thickness:66419}  Daily Fluid Restriction: {CHP DME Yes amt example:513357228}  Last Modified Barium Swallow with Video (Video Swallowing Test): {Done Not Done MMZJ:100985119}    Treatments at the Time of Hospital Discharge:   Respiratory Treatments: ***  Oxygen Therapy:  {Therapy; copd oxygen:81118}  Ventilator:    {MH CC Vent AZXR:771698992}    Rehab Therapies: Physical Therapy, Occupational Therapy, and Nurse  Weight Bearing Status/Restrictions: [facility] Weight Bearin}  Other Medical Equipment (for information only, NOT a DME order):  {EQUIPMENT:031355568}  Other Treatments: HOME HEALTH CARE: LEVEL 3 [address] to establish plan of care for patient over 60 day period   Nursing  Initial home SN evaluation visit to occur within 24-48 hours for:  1)  medication management  2)  VS and clinical assessment  3)  S&S chronic disease exacerbation education + when to contact MD/NP  4)  care coordination  Medication Reconciliation during 1st SN visit  PT/OT/Speech   Evaluations in home within 24-48 hours of discharge to include DME and home safety   Frontload therapy 5 days, then 3x a week   OT to evaluate if patient has [address] needs for personal care    evaluation within 24-48 hours to evaluate resources & insurance for potential AL, [place], LTC, and Medicaid options   Palliative Care referral within 5 days of hospital discharge   PCP Visit scheduled within 3 - 7 days of hospital discharge    [facility] Vitals (If patient is agreeable and meets guidelines)      Patient's personal belongings (please select all that are sent with patient):  {CHP DME Belongings:800741026}    RN SIGNATURE:  {Esignature:[id number]}    CASE MANAGEMENT/SOCIAL WORK SECTION    Inpatient Status Date: ***    Readmission Risk Assessment Score:  Readmission Risk              Risk of Unplanned Readmission:  15           Discharging to Facility/ Agency   Name:  [facility] care    Address: [address] [place] [address], [address] [place] [address]  Phone: [phone]  Fax: [phone]    Dialysis Facility (if applicable)   Name:  Address:  Dialysis Schedule:  Phone:  Fax:    / signature: {Esignature:763676386}    PHYSICIAN SECTION    Prognosis: Good    Condition at Discharge: Stable    Rehab Potential (if transferring to Rehab): Good    Recommended Labs or Other Treatments After Discharge: None    Physician Certification: I certify the above information and transfer of Asher Jensen  is necessary for the continuing treatment of the diagnosis listed and that she requires Home Care for less 30 days.      Update Admission H&P: No change in H&P    PHYSICIAN SIGNATURE:  Electronically signed by Loraine Mejía MD on 11/9/21 at 3:06 PM EST

## 2021-11-09 NOTE — PROGRESS NOTES
Nephrology Progress  Note                                                                                                                                                                                                                                                                                                                                                               Office : [phone]     Fax :[phone]              Patient's Name: Deon Hoffmann  12:00 PM  11/9/2021    Reason for Consult:  Hyponatremia,  Requesting Physician:  Cash Hastings MD      Chief Complaint:  N, V, diarrhea      Interval History :     covid +  On lasix   Not in acute distress  Renal fn stable  S/p LHC on 11/5 : s/o elevated right side pressure , acute CHF  No diarrhea  No vomiting        History of Present Ilness:    Deon Hoffmann is a 79 y.o. female with PMH of HTN , hypothyroidism ,Afib     Presented to ED with c/o nausea  Vomiting and diarrhea   Onset last 4-5 days  C/o weakness    Nephrology consult for hyponatremia management      Past Medical History:   Diagnosis Date    COVID-19 11/08/2021    Hypertension     Thyroid disease     hypothyroidism       Past Surgical History:   Procedure Laterality Date    HEMORRHOID SURGERY      HYSTERECTOMY      KNEE ARTHROSCOPY Right 3/10/2016    TONSILLECTOMY         History reviewed. No pertinent family history. reports that she quit smoking about 41 years ago. She has never used smokeless tobacco. She reports current alcohol use of about 12.0 - 14.0 standard drinks of alcohol per week. She reports that she does not use drugs.     Allergies:  Shellfish-derived products    Current Medications:    magnesium sulfate 2000 mg in 50 mL IVPB premix, Once  metoprolol succinate (TOPROL XL) extended release tablet 150 mg, BID  lisinopril (PRINIVIL;ZESTRIL) tablet 10 mg, Daily  melatonin disintegrating tablet 5 mg, Nightly  psyllium (METAMUCIL) 58.12 % packet 1 packet, Daily  digoxin (LANOXIN) tablet 125 mcg, Daily  apixaban (ELIQUIS) tablet 5 mg, BID  furosemide (LASIX) tablet 20 mg, Daily  aspirin chewable tablet 81 mg, Daily  sodium chloride flush 0.9 % injection 5-40 mL, 2 times per day  sodium chloride flush 0.9 % injection 5-40 mL, PRN  0.9 % sodium chloride infusion, PRN  atorvastatin (LIPITOR) tablet 40 mg, Nightly  perflutren lipid microspheres (DEFINITY) injection 1.65 mg, ONCE PRN  cefTRIAXone (ROCEPHIN) 2000 mg IVPB in D5W 50ml minibag, Q24H  calcium carbonate (TUMS) chewable tablet 500 mg, TID PRN  sodium chloride flush 0.9 % injection 10 mL, 2 times per day  sodium chloride flush 0.9 % injection 10 mL, PRN  0.9 % sodium chloride infusion, PRN  acetaminophen (TYLENOL) tablet 650 mg, Q6H PRN   Or  acetaminophen (TYLENOL) suppository 650 mg, Q6H PRN  glucose (GLUTOSE) 40 % oral gel 15 g, PRN  dextrose 50 % IV solution, PRN  glucagon (rDNA) injection 1 mg, PRN  dextrose 5 % solution, PRN  levothyroxine (SYNTHROID) tablet 100 mcg, Daily  sodium chloride flush 0.9 % injection 5-40 mL, 2 times per day  sodium chloride flush 0.9 % injection 5-40 mL, PRN  0.9 % sodium chloride infusion, PRN  ondansetron (ZOFRAN) injection 4 mg, Q1H PRN        Review of Systems:   14 point ROS obtained but were negative except mentioned in HPI      Physical exam:     Vitals:  /72   Pulse 92   Temp 95.3 °F (35.2 °C) (Oral)   Resp 19   Ht 5' 6\" (1.676 m)   Wt 150 lb 12.8 oz (68.4 kg)   SpO2 96%   BMI 24.34 kg/m²   Constitutional:  OAA X3 NAD  Skin: no rash, turgor wnl  Heent:  eomi, mmm  Neck: no bruits or jvd noted  Cardiovascular:  S1, S2 without m/r/g  Respiratory: CTA B without w/r/r  Abdomen:  +bs, soft, nt, nd  Ext: no  lower extremity edema  Psychiatric: mood and affect appropriate  Musculoskeletal:  Rom, muscular strength intact    Data:   Labs:  CBC:   Recent Labs     11/07/21  0547 11/08/21  0507 11/09/21  0451   WBC 17.4* 18.8* 16.7*   HGB 11.4* 11.7* 12.0    429 434     BMP:    Recent Labs     11/07/21  0547 11/08/21  0507 11/09/21  0451   * 134* 133*   K 3.3* 4.1 3.9   CL 97* 97* 95*   CO2 29 27 27   BUN 15 12 13   CREATININE 0.8 0.8 0.8   GLUCOSE 128* 107* 106*     Ca/Mg/Phos:   Recent Labs     11/07/21  0547 11/08/21  0507 11/09/21  0451   CALCIUM 8.6 8.9 8.6   MG 1.80 2.00 1.90     Hepatic:   No results for input(s): AST, ALT, ALB, BILITOT, ALKPHOS in the last 72 hours. Troponin:   No results for input(s): TROPONINI in the last 72 hours. BNP: No results for input(s): BNP in the last 72 hours. Lipids: No results for input(s): CHOL, TRIG, HDL, LDLCALC, LABVLDL in the last 72 hours. ABGs: No results for input(s): PHART, PO2ART, KFE5CHA in the last 72 hours. INR: No results for input(s): INR in the last 72 hours. UA:  No results for input(s): Dale Climes, GLUCOSEU, BILIRUBINUR, KETUA, SPECGRAV, BLOODU, PHUR, PROTEINU, UROBILINOGEN, NITRU, LEUKOCYTESUR, Blunt Christian in the last 72 hours. Urine Microscopic:   No results for input(s): LABCAST, BACTERIA, COMU, HYALCAST, WBCUA, RBCUA, EPIU in the last 72 hours. Urine Culture:   No results for input(s): LABURIN in the last 72 hours. Urine Chemistry:   No results for input(s): Nadeen Cheers, PROTEINUR, NAUR in the last 72 hours. IMAGING:  CT ABDOMEN PELVIS WO CONTRAST Additional Contrast? None   Final Result   1. No acute finding in the abdomen or pelvis. 2. Cholelithiasis and diverticulosis noted incidentally. 3. Trace pleural effusions in the lower chest with peripheral airspace   opacities. Correlate with any pulmonary symptoms. A developing viral   infection or pulmonary edema may be considered. XR CHEST PORTABLE   Final Result   Mild cardiomegaly and COPD. No other cardiopulmonary disease. Assessment/Plan   1.  Hyponatremia      Serum Na 122  -----> 126  ---> 134    Etiology could be hypovolemic hyponatremia due to vomiting , diarrhea   Poor solute intake VS SIADH    Volume status : hypovolemic side    Urine Na < 20     Plan    Moniter serum Na    Cont lasix    Encourage high protein/solute diet        2. NAGMA 2/2 diarrhea    improved    3.  CKD stage 3    montier    Serum cr stable    Avoid NSAIDs      4 N/V/D    improved      5 Hypokalemia    Replace with KCL prn      6 Acute CHF    Cont lasix and ACEI        7 Hypomagnesemia    Replace with IV Mg    8 Covid 19 infection +    Management per primary team            Thank you for allowing us to participate in care of Yefri Bhatia MD  Feel free to contact me   [facility] [address]  Office : [phone]  Fax :[phone]

## 2021-11-09 NOTE — PROGRESS NOTES
Writer reviewed discharge orders with patient. All questions answered. IV removed with no complications. Prescriptions given to patient. Patient taken to the lobby via wheelchair.      11/9/2021  Rosa M Clark RN

## 2021-11-09 NOTE — CARE COORDINATION
Per Cardiology, plan to transition from IV to PO lasix 20 mg daily. Restart Eliquis tomorrow. On a 2 liter per day fluid restriction. Nephrology following and monitoring serum na. Plan is for home with home care. [facility] following.

## 2021-11-10 ENCOUNTER — CARE COORDINATION (OUTPATIENT)
Dept: CASE MANAGEMENT | Age: 81
End: 2021-11-10

## 2021-11-10 NOTE — DISCHARGE SUMMARY
Hospital Medicine Discharge Summary    Patient ID: Jennie Modi      Patient's PCP: Isom Kayser, MD    Admit Date: 10/30/2021     Discharge Date: 11/9/2021      Admitting Physician: Jeana Rosa DO     Discharge Physician: Richard Payne MD     Discharge Diagnoses: Active Hospital Problems    Diagnosis     COVID-19 [U07.1]     Acute renal failure (HCC) [N17.9]     Persistent atrial fibrillation (HCC) [I48.19]     Pulmonary hypertension (HCC) [I27.20]     Rapid atrial fibrillation (HCC) [I48.91]     Urinary tract infection with hematuria [N39.0, R31.9]     PVC (premature ventricular contraction) [I49.3]     Hypokalemia [E87.6]     Cardiomyopathy ([address] [I42.9]     Acute systolic heart failure ([address] [I50.21]     Sepsis ([address] [A41.9]        The patient was seen and examined on day of discharge and this discharge summary is in conjunction with any daily progress note from day of discharge. Hospital Course:            Acute E. coli cystitis with Bacteremia   - WBC has decreased to 17.8, could also be reactive  - Symptomatically improving   - CXR showed no acute process & Covid negative  - Blood cultures x2 grew E. Coli , pan sensitive with resistance to bactrim   - Urine culture showed E. coli   - Past abx regimen: Vancomycin x1 on 10/30 and Zosyn (3 days)   - Continue high dose Rocephin started on or before 2 November. Anticipate PO ABX at discharge.  - Repeat blood culture NGTD      Acute Biventricular Systolic HF HFrEF- newly diagnosed this admission  - Echo demonstrated decreased EF to 35-40% and left atrium is severely dilated.  Previous Echo completed at [facility] on 6/2020 had EF of 52-57%   - Cardiology consulted and recommended:              - Continue lisinopril, metoprolol, po lasix                - Added Digoxin               - Considering ROBERTO and DCCV once recovered from infection     Non-obstructive CAD  - LHC/RHC completed 11/5: Non-obstructive CAD, non-ischemic cardiomyopathy, mild pulmonary hypertension  -Continue aspirin, statin, lisinopril      Electrolyte disturbance   - Nephrology following and assisting with management  - Cardiology would like K > 4, Mg > 2     A. Fib with RVR   - Per cardiology, severely dilated left atrium. coreg has been changed to metoprolol for better rate control  - Continue metoprolol   - Continue Eliquis BID      Constipation  -Restarted home bowel regimen of daily metamucil      SHAY - resolved    - Etiology unknown. Likely secondary to dehydration, sepsis  - Cr 0.8 from 1.7 on admission  - Nephrology following and assisting with management        Hyperglycemia W/o Diabetes   - A1C of 5.7%      HypoThyroid - clinically euthyroid on oral replacement therapy. Continue, w/ outpt monitoring as previously arranged.        Labs: For convenience and continuity at follow-up the following most recent labs are provided:      CBC:    Lab Results   Component Value Date    WBC 16.7 11/09/2021    HGB 12.0 11/09/2021    HCT 35.2 11/09/2021     11/09/2021       Renal:    Lab Results   Component Value Date     11/09/2021    K 3.9 11/09/2021    K 3.7 11/04/2021    CL 95 11/09/2021    CO2 27 11/09/2021    BUN 13 11/09/2021    CREATININE 0.8 11/09/2021    CALCIUM 8.6 11/09/2021         Significant Diagnostic Studies    Radiology:   CT ABDOMEN PELVIS WO CONTRAST Additional Contrast? None   Final Result   1. No acute finding in the abdomen or pelvis. 2. Cholelithiasis and diverticulosis noted incidentally. 3. Trace pleural effusions in the lower chest with peripheral airspace   opacities. Correlate with any pulmonary symptoms. A developing viral   infection or pulmonary edema may be considered. XR CHEST PORTABLE   Final Result   Mild cardiomegaly and COPD. No other cardiopulmonary disease.                 Consults:     IP CONSULT TO HOSPITALIST  IP CONSULT TO NEPHROLOGY  IP CONSULT TO HOME CARE NEEDS  IP CONSULT TO HEART FAILURE NURSE/COORDINATOR  IP CONSULT TO CARDIOLOGY  IP CONSULT TO HOME CARE NEEDS    Disposition: home w/ HHC     Condition at Discharge: Stable    Discharge Instructions/Follow-up:  w/ PCP 1-2 weeks and subspecialists as arranged. Code Status:  Full Code    Activity: activity as tolerated    Diet: regular diet      Discharge Medications:     Discharge Medication List as of 11/9/2021  3:48 PM           Details   aspirin 81 MG chewable tablet Take 1 tablet by mouth daily, Disp-30 tablet, R-0Print      lisinopril (PRINIVIL;ZESTRIL) 10 MG tablet Take 1 tablet by mouth daily, Disp-30 tablet, R-0Print      atorvastatin (LIPITOR) 40 MG tablet Take 1 tablet by mouth nightly, Disp-30 tablet, R-0Print      metoprolol succinate (TOPROL XL) 50 MG extended release tablet Take 3 tablets by mouth 2 times daily, Disp-180 tablet, R-0Print      digoxin (LANOXIN) 125 MCG tablet Take 1 tablet by mouth daily, Disp-30 tablet, R-0Print      furosemide (LASIX) 20 MG tablet Take 1 tablet by mouth daily, Disp-30 tablet, R-0Print      cefUROXime (CEFTIN) 500 MG tablet Take 1 tablet by mouth 2 times daily for 5 days, Disp-10 tablet, R-0Print              Details   budesonide (ENTOCORT EC) 3 MG extended release capsule Take 3 mg by mouth every morning Historical Med      levothyroxine (SYNTHROID) 112 MCG tablet Take 112 mcg by mouth DailyHistorical Med      escitalopram (LEXAPRO) 10 MG tablet Take 10 mg by mouth dailyHistorical Med      apixaban (ELIQUIS) 5 MG TABS tablet Take 1 tablet by mouth 2 times daily, Disp-60 tablet, R-0NO PRINT             Time Spent on discharge is more than 30 minutes in the examination, evaluation, counseling and review of medications and discharge plan. Signed:    Carmin Lesch, MD   11/10/2021      Thank you Stephanie Valadez MD for the opportunity to be involved in this patient's care. If you have any questions or concerns please feel free to contact me at [phone].

## 2021-11-10 NOTE — CARE COORDINATION
[facility]ar[facility] Transitions Initial Follow Up Call    Call within 2 business days of discharge: Yes    Patient: Chelsea Demarco Patient : 1940   MRN: [id number]  Reason for Admission: ARF and Covid +, afib, pulmonary HTN, afib, UTI, cardiomyopathy, CHF, sepsis  Discharge Date: 21 RARS: Readmission Risk Score: 11.2 ( )      Last Discharge [facility]       Complaint Diagnosis Description Type Department Provider    10/30/21 Nausea; Fatigue Acute renal failure, unspecified acute renal failure type ([facility] . .. ED to Hosp-Admission (Discharged) (ADMITTED) Quique Jaffe MD; Flor Perez. .. Spoke with: Tam Pool permission to speak to daughterNguyen +   Vaccinated 21 and 3/17/21    Facility: [facility]    Non-face-to-face services provided:  Scheduled appointment with Specialist-Dr Garcia  Obtained and reviewed discharge summary and/or continuity of care documents  Communication with home health agencies or other community services the patient is currently using-[facility]     Transitions of Care Initial Call  Challenges to be reviewed by the provider   Additional needs identified to be addressed with provider: No  none             Method of communication with provider : phone      Advance Care Planning:   Does patient have an Advance Directive: reviewed and current, reviewed and needs to be updated, not on file; education provided, not on file, patient declined education, decision maker updated and referral to internal ACP facilitator. Was this a readmission? No  Patient stated reason for admission: covid  Patients top risk factors for readmission: medical condition-covid    Care Transition Nurse (CTN) contacted the patient by telephone to perform post hospital discharge assessment. Verified name and  with patient as identifiers. Provided introduction to self, and explanation of the CTN role.      CTN reviewed discharge instructions, medical action plan and red flags with patient who verbalized understanding. Patient given an opportunity to ask questions and does not have any further questions or concerns at this time. Were discharge instructions available to patient? Yes. Reviewed appropriate site of care based on symptoms and resources available to patient including: Specialist and Home health. The patient agrees to contact the PCP office for questions related to their healthcare. Medication reconciliation was performed with patient, who verbalizes understanding of administration of home medications. Advised obtaining a 90-day supply of all daily and as-needed medications. Covid Risk Education     Educated patient about risk for severe COVID-19 due to risk factors according to CDC guidelines. CTN reviewed discharge instructions, medical action plan and red flag symptoms with the patient who verbalized understanding. Discussed COVID vaccination status: Yes. Education provided on COVID-19 vaccination as appropriate. Discussed exposure protocols and quarantine with CDC Guidelines. Patient was given an opportunity to verbalize any questions and concerns and agrees to contact CTN or health care provider for questions related to their healthcare. Patient answered call and verified . Patient pleasant and agreeable to transition call. Patient happy to be back home and was lying down to take a nap. Patient discussed recent hospitalization and continues to be fatigued. Patient confirmed that she is taking all medication as directed. Reviewed and educated patient and daughter, Sherrlyn Olszewski on any new and changed medications related to discharge diagnosis. Full reconciliation completed. Follow up visits scheduled and daughter to take patient to appt. [facility] and confirmed that [facility] was received for [facility]. services. Patient to get repeat covid test today at PCP office so SOC to be scheduled tomorrow. Denies any acute needs at present time.

## 2021-11-11 ENCOUNTER — FOLLOWUP TELEPHONE ENCOUNTER (OUTPATIENT)
Dept: TELEMETRY | Age: 81
End: 2021-11-11

## 2021-11-11 NOTE — TELEPHONE ENCOUNTER
Call placed for hospital follow up. Pt still asleep. Spouse asked that writer call back in a bit to speak to her. Will give time and call back later today.  Hu Marshall RN

## 2021-11-12 ENCOUNTER — TELEPHONE (OUTPATIENT)
Dept: CARDIOLOGY CLINIC | Age: 81
End: 2021-11-12

## 2021-11-12 ENCOUNTER — FOLLOWUP TELEPHONE ENCOUNTER (OUTPATIENT)
Dept: TELEMETRY | Age: 81
End: 2021-11-12

## 2021-11-12 NOTE — TELEPHONE ENCOUNTER
Writer spoke to patient spouse for hospital follow up. Spouse states that patient remains sleeping at this time and he would be glad to answer all questions. Did verbalize concern with a positive Covid rapid test when she followed up with PCP and PCR was negative. States the test was inaccurate and verbalized concern because the health department is following up with him. Pt and him are both asymptomatic. Denies any other needs. See HF flow sheet for follow up information.  Nadia Rosenbaum RN

## 2021-11-12 NOTE — TELEPHONE ENCOUNTER
Patient's daughter Purvi Villalpando [phone] called in regards to canceled ROBERTO/CV (from inpatient). It was canceled due to positive covid test but has since had PCR test that was negative. Please advise if ok to reschedule and if anything else is needed. I let her know I'm OOT Monday and will check in with patient on Tues if she has not heard from anyone prior to then. Ok to call patient or daughter.

## 2021-11-15 ENCOUNTER — CARE COORDINATION (OUTPATIENT)
Dept: CASE MANAGEMENT | Age: 81
End: 2021-11-15

## 2021-11-15 NOTE — TELEPHONE ENCOUNTER
Called pt to relay message and spoke to pts daughter. She stated pts bp yesterday was 90/59 and the pt was feeling very weak. Pts daughter called on call doctor yesterday  and they recommended she stop lisinopril and  Metoprolol. Kelsey Barragan stated the pt is feeling much better today after stopping medications, but wants to know what you would like them to do.  Ty

## 2021-11-15 NOTE — TELEPHONE ENCOUNTER
Recommend that she restart metoprolol succinate at reduced dose of 25 mg BID and lisinopril at reduced dose of 5 mg daily. Please ask her to continue checking her BP 1-2 times daily this week and notify us if SBP is less than 90 or DBP is less than 50. Thanks.

## 2021-11-16 RX ORDER — METOPROLOL SUCCINATE 25 MG/1
25 TABLET, EXTENDED RELEASE ORAL 2 TIMES DAILY
Qty: 60 TABLET | Refills: 3 | Status: SHIPPED | OUTPATIENT
Start: 2021-11-16 | End: 2022-04-07

## 2021-11-16 RX ORDER — LISINOPRIL 5 MG/1
5 TABLET ORAL DAILY
Qty: 30 TABLET | Refills: 3 | Status: SHIPPED | OUTPATIENT
Start: 2021-11-16 | End: 2021-12-27

## 2021-11-18 ENCOUNTER — TELEPHONE (OUTPATIENT)
Dept: CARDIOLOGY CLINIC | Age: 81
End: 2021-11-18

## 2021-11-18 ENCOUNTER — CARE COORDINATION (OUTPATIENT)
Dept: CASE MANAGEMENT | Age: 81
End: 2021-11-18

## 2021-11-18 NOTE — TELEPHONE ENCOUNTER
Called and spoke with pt daughter to try and get more information. Mena Jarrett stated that La Mcelroy has been extremely weak, fatigued and dizzy since cath.  Recent bp log:  Sunday- 128/74, 120/69  Monday- 106/62, 120/79  Tuesday- 109/69, 98/64  Wednesday-  103/60, 101/65  Thursday- 109/64  Pt pulse was between 67-83 each day

## 2021-11-18 NOTE — CARE COORDINATION
[facility] Transitions Follow Up Call    2021    Patient: Samira Adames  Patient : 1940   MRN: [id number]  Reason for Admission: covid monitoring  Discharge Date: 21 RARS: Readmission Risk Score: 11.2 ( )         Spoke with: , Nanda Kim Transitions Follow Up Call    Needs to be reviewed by the provider   Additional needs identified to be addressed with provider: Yes  none             Method of communication with provider : chart routing      Care Transition Nurse (CTN) contacted the family by telephone to follow up after admission. Verified name and  with family as identifiers. Addressed changes since last contact: none  Discussed follow-up appointments. If no appointment was previously scheduled, appointment scheduling offered: Yes. Is follow up appointment scheduled within 7 days of discharge? No.    Advance Care Planning:   Does patient have an Advance Directive: reviewed and current, reviewed and needs to be updated, not on file; education provided, not on file, patient declined education, decision maker updated and referral to internal ACP facilitator. CTN reviewed discharge instructions, medical action plan and red flags with family and discussed any barriers to care and/or understanding of plan of care after discharge. Discussed appropriate site of care based on symptoms and resources available to patient including: PCP and Specialist. The patient agrees to contact the PCP office for questions related to their healthcare. Patients top risk factors for readmission: medical condition-ARF  Interventions to address risk factors: routing note to MD    Spoke to patient's . HIPAA form verified in system. Patient continues to have lightheadedness and note seen in system from daughter. Daughter has reached out to cardiology office and given BP and HR results. No follow up note at time of transition call. CTN to route message to provider as well.  requesting follow up blood work as well as thyroid work up. Denies any acute needs at present time. Agreeable to f/u calls. Educated on the use of urgent care or physicians 24 hr access line if assistance is needed after hours. CTN provided contact information for future needs. Plan for follow-up call in 10-14 days based on severity of symptoms and risk factors. Care Transitions Subsequent and Final Call    Subsequent and Final Calls  Do you have any ongoing symptoms?: No  Have your medications changed?: No  Do you have any questions related to your medications?: No  Do you currently have any active services?: Yes  Are you currently active with any services?: Home Health  Do you have any needs or concerns that I can assist you with?: No  Identified Barriers: None  Care Transitions Interventions  Other Interventions:            Follow Up  Future Appointments   Date Time Provider Janelle Nation   12/15/2021  3:15 PM DO Ran Shell RN

## 2021-11-18 NOTE — TELEPHONE ENCOUNTER
Spoke with Margarita Sarkar, and she said to add pt on with [facility] on 11/23 @8:15. Spoke with pt and she is aware.  Please and thank you!!!

## 2021-11-18 NOTE — TELEPHONE ENCOUNTER
Patients daughter calling to try to get an idea as to when her mother should be more back to herself. The patient is still having issues with lightheadedness, and is just not feeling well still. She was in [facility]  10/30/2021 - 11/9/2021. Dr. Marjorie Betancourt  [facility] 11/5/2021.     TY

## 2021-11-24 ENCOUNTER — CARE COORDINATION (OUTPATIENT)
Dept: CASE MANAGEMENT | Age: 81
End: 2021-11-24

## 2021-11-24 NOTE — CARE COORDINATION
[facility] Transitions Follow Up Call    2021    Patient: Gideon Born  Patient : 1940   MRN: [id number]  Reason for Admission: ARF/covid  Discharge Date: 21 RARS: Readmission Risk Score: 11.2 ( )         Spoke with: na    Attempted to reach patient via phone for  transition call. VM left stating purpose of call along with my contact information requesting a return call. Care Transitions Subsequent and Final Call    Subsequent and Final Calls  Care Transitions Interventions  Other Interventions:            Follow Up  Future Appointments   Date Time Provider Janelle Nation   12/15/2021  3:15 PM Jimena Rivas DO [address] RN

## 2021-12-03 ENCOUNTER — CARE COORDINATION (OUTPATIENT)
Dept: CASE MANAGEMENT | Age: 81
End: 2021-12-03

## 2021-12-03 NOTE — CARE COORDINATION
[facility]ar[facility] 45 Transitions Follow Up Call    12/3/2021    Patient: Saad Marsh  Patient : 1940   MRN: [id number]  Reason for Admission: Nausea  Discharge Date: 21 RARS: Readmission Risk Score: 11.2 ( )         Spoke with: [address] Transitions Follow Up Call    Needs to be reviewed by the provider   Additional needs identified to be addressed with provider: No  home health care-[facility]             Method of communication with provider : none      Care Transition Nurse (CTN) contacted the patient by telephone to follow up after admission on 10/30/2021. Verified name and  with patient as identifiers. Addressed changes since last contact: none  Discussed follow-up appointments. If no appointment was previously scheduled, appointment scheduling offered: Yes. Is follow up appointment scheduled within 7 days of discharge? Yes. Advance Care Planning:   Does patient have an Advance Directive: Not on file  CTN reviewed discharge instructions, medical action plan and red flags with patient and discussed any barriers to care and/or understanding of plan of care after discharge. Discussed appropriate site of care based on symptoms and resources available to patient including: PCP, Specialist, Urgent care clinics, Home health, When to call 911 and [address]. The patient agrees to contact the PCP office for questions related to their healthcare. Patients top risk factors for readmission: ineffective coping  Interventions to address risk factors: Obtained and reviewed discharge summary and/or continuity of care documents      Non-[facility] follow up appointment(s):     CTN provided contact information for future needs. Plan for follow-up call in 3-5 days based on severity of symptoms and risk factors. Plan for next call: self management-Stress managed? Appetite better? Nausea subsiding? This [facility] spoke with pt and pt stated that she is doing well other than nausea.  Pt stated that she can only eat very small portions at a time. Pt stated that she is experiencing nausea but denies emesis. Pt is still weak and fatigued as well. Patient denied any worsening symptoms. Denied fever, chills,  and any difficulty breathing at this time. Denied chest pain and SOB. Pt stated that she is under a lot of stress and grief. Pt has lost several close friends in the month of November and one of her best friends is very ill. Pt encouraged to eat small, healthy meals and to stay hydrated and to not over exert but to move about as tolerated to build stamina. Pt denied any other needs and concerns at this time. Advised pt to immediately report any worsening symptoms to the PCP. Patient verbalized understanding and agreed. Fabiana Berry LPN, [facility]  PH: [phone]                Care Transitions Subsequent and Final Call    Schedule Follow Up Appointment with PCP: Completed  Subsequent and Final Calls  Do you have any ongoing symptoms?: Yes  Onset of Patient-reported symptoms: Other  Patient-reported symptoms: Nausea  Interventions for patient-reported symptoms: Other  Have your medications changed?: No  Do you have any questions related to your medications?: No  Do you currently have any active services?: Yes  Are you currently active with any services?: Home Health  Do you have any needs or concerns that I can assist you with?: No  Identified Barriers: Stress  Care Transitions Interventions   Home Care Waiver: Completed     Other Interventions:            Follow Up  Future Appointments   Date Time Provider Janelle Nation   12/15/2021  3:15 PM Cachorro Ferreira DO [facility]       Fabiana Berry LPN

## 2021-12-06 ENCOUNTER — TELEPHONE (OUTPATIENT)
Dept: CARDIOLOGY CLINIC | Age: 81
End: 2021-12-06

## 2021-12-06 NOTE — TELEPHONE ENCOUNTER
OK to trial imodium. If diarrhea does not improve over the course of the next several days, I recommend that she follow-up with her PCP for further evaluation.

## 2021-12-06 NOTE — TELEPHONE ENCOUNTER
Pt has had diarrhea for the past 4 days. Pt wants to know if she can take Imodium AD. Please advise.

## 2021-12-10 ENCOUNTER — TELEPHONE (OUTPATIENT)
Dept: CARDIOLOGY CLINIC | Age: 81
End: 2021-12-10

## 2021-12-10 RX ORDER — FUROSEMIDE 20 MG/1
20 TABLET ORAL DAILY
Qty: 30 TABLET | Refills: 11 | Status: SHIPPED | OUTPATIENT
Start: 2021-12-10 | End: 2021-12-13

## 2021-12-10 RX ORDER — DIGOXIN 125 MCG
125 TABLET ORAL DAILY
Qty: 30 TABLET | Refills: 11 | Status: SHIPPED | OUTPATIENT
Start: 2021-12-10 | End: 2021-12-13

## 2021-12-10 NOTE — TELEPHONE ENCOUNTER
Sebastian Rivas, pt's dtr, contacted office asking if pt should continue taking Furosemide and Digoxin. These medications were prescribed while the pt was hospitalized and was given a 30 day supply with no refills and now the pt is out of medication as of tomorrow (12/11/21). Please advise if medication should still be taken, if so please send refills to [address], [address] [place] [address], [address] [place] [id number]   Phone:  [phone]  Fax:  [phone]    PLEASE CALL PT'S DTR STACIE WITH RESPONSE -[phone].

## 2021-12-13 RX ORDER — DIGOXIN 125 MCG
125 TABLET ORAL DAILY
Qty: 90 TABLET | Refills: 3 | Status: ON HOLD | OUTPATIENT
Start: 2021-12-13 | End: 2022-01-17 | Stop reason: HOSPADM

## 2021-12-13 RX ORDER — FUROSEMIDE 20 MG/1
20 TABLET ORAL DAILY
Qty: 90 TABLET | Refills: 3 | Status: SHIPPED | OUTPATIENT
Start: 2021-12-13 | End: 2022-04-05

## 2021-12-13 NOTE — TELEPHONE ENCOUNTER
LV 10/27/20  Meds were just filled for 30 with 11 refills  Patient would like 90 with 3 refills please.

## 2021-12-14 ENCOUNTER — TELEPHONE (OUTPATIENT)
Dept: CARDIOLOGY CLINIC | Age: 81
End: 2021-12-14

## 2021-12-14 ASSESSMENT — ENCOUNTER SYMPTOMS
BLOOD IN STOOL: 0
EYE PAIN: 0
ABDOMINAL PAIN: 0
PHOTOPHOBIA: 0
RHINORRHEA: 0
SORE THROAT: 0
NAUSEA: 0
COUGH: 0
DIARRHEA: 0
VOMITING: 0
CONSTIPATION: 0

## 2021-12-14 NOTE — TELEPHONE ENCOUNTER
Pt will be having and EGD with dilation on 12/17/2021 with Dr. Reji Mcnally @ [address] [place] [address]. Pt will be under general anesthesia. They are requesting that Eliquis be held 3 days prior. If clearance is given please fax a letter to [phone]. Last OV 10/27/2021 with [facility].

## 2021-12-14 NOTE — PROGRESS NOTES
[address] [facility]   Cardiovascular Evaluation    PATIENT: Cesar Peterson  DATE: 12/15/2021  MRN: [id number]  CSN: [id number]  : 1940      Primary Care Doctor: Oly Cedeño MD  Reason for evaluation:   Follow-up for atrial fibrillation, non-ischemic cardiomyopathy    Subjective:     Keyur English is an 45-year-old female with a history of atrial fibrillation, biventricular systolic heart failure secondary to non-ischemic cardiomyopathy, essential hypertension, mitral regurgitation, mild pulmonary hypertension, and tobacco use who presents for follow-up. The patient previously received her cardiology care at [facility].  She was diagnosed with a mildly reduced LVEF of 40-45% on a TTE from [facility] in . At that time, she underwent invasive coronary angiography which demonstrated no angiographically significant coronary artery disease. Her LVEF had recovered to 60-65% on a repeat TTE from . She was initially evaluated by me in clinic on 10/27/21 for further evaluation of newly diagnosed atrial fibrillation. There were plans to proceed with elective ROBERTO/DCCV once she had an adequate period of anticoagulation with Eliquis. However, she was then admitted to [facility] from 10/30 - 21 with a UTI and sepsis. Both urine and blood cultures grew E. Coli and she was treated with an appropriate course of antibiotics. During her admission, she was in atrial fibrillation with RVR and a TTE obtained on 11/3/21 showed an LVEF of 35-40%, normal RV size with reduced RV systolic function, mild-moderate eccentric and posteriorly-directed mitral regurgitation, mild pulmonic regurgitation, moderate tricuspid regurgitation, and estimated SPAP of 44 mmHg. She underwent invasive coronary angiography/LHC/RHC on 21 which demonstrated non-obstructive coronary artery disease and the following hemodynamics: RA 8, RV 42/8, PA 40/24 (30), PCWP 17, Danish CO 4.48, and Danish CI 2.43.   LVEDP was 15 mmHg. She was gradually diuresed and there were plans to perform ROBERTO/DCCV during her admission, but she later tested positive for COVID-19 on 11/8/21 and therefore it was recommended that the procedure be delayed until she had a additional time to recover from her infection. She says that she later had a PCR test done after her discharge that she says was negative. Since her discharge, the patient reports that she has been feeling weak and fatigued. She has also had some mild lightheadedness when up on her feet. She denies any syncope. Her daughter had recently called in to report that she had some BP readings in the 90s/50s. Another provider recommended that her lisinopril and metoprolol succinate be held, after which she felt better. Her metoprolol succinate dose was then restarted at a reduced dose of 25 mg BID and her lisinopril was restarted at a reduced dose of 5 mg daily. She denies any chest pain, shortness of breath, palpitations, lower extremity edema, or orthopnea. Patient Active Problem List   Diagnosis    Localized osteoarthrosis, lower leg    Tear of lateral cartilage or meniscus of knee, current    Tear of medial meniscus of right knee    Primary osteoarthritis of right knee    Sepsis ([address]    Cardiomyopathy ([address]    Acute systolic heart failure (HCC)    Dyspnea    Rapid atrial fibrillation (HCC)    Urinary tract infection with hematuria    PVC (premature ventricular contraction)    Hypokalemia    COVID-19    Acute renal failure (HCC)    Persistent atrial fibrillation ([address]    Pulmonary hypertension ([address]         Past Medical History:   has a past medical history of COVID-19, Hypertension, and Thyroid disease. Surgical History:   has a past surgical history that includes Hemorrhoid surgery; Hysterectomy; Tonsillectomy; and Knee arthroscopy (Right, 3/10/2016). Social History:   reports that she quit smoking about 41 years ago.  She has never used smokeless tobacco. She reports current alcohol use of about 12.0 - 14.0 standard drinks of alcohol per week. She reports that she does not use drugs. Family History:  No evidence for sudden cardiac death or premature CAD. Home Medications:  Reviewed and are listed in nursing record. and/or listed below  Current Outpatient Medications   Medication Sig Dispense Refill    furosemide (LASIX) 20 MG tablet TAKE 1 TABLET BY MOUTH DAILY 90 tablet 3    digoxin (LANOXIN) 125 MCG tablet TAKE 1 TABLET BY MOUTH DAILY 90 tablet 3    metoprolol succinate (TOPROL XL) 25 MG extended release tablet Take 1 tablet by mouth 2 times daily 60 tablet 3    lisinopril (PRINIVIL;ZESTRIL) 5 MG tablet Take 1 tablet by mouth daily 30 tablet 3    budesonide (ENTOCORT EC) 3 MG extended release capsule Take 3 mg by mouth every morning       levothyroxine (SYNTHROID) 112 MCG tablet Take 112 mcg by mouth Daily      escitalopram (LEXAPRO) 10 MG tablet Take 10 mg by mouth daily       aspirin 81 MG chewable tablet Take 1 tablet by mouth daily (Patient not taking: Reported on 12/15/2021) 30 tablet 0    atorvastatin (LIPITOR) 40 MG tablet Take 1 tablet by mouth nightly (Patient not taking: Reported on 12/15/2021) 30 tablet 0    apixaban (ELIQUIS) 5 MG TABS tablet Take 1 tablet by mouth 2 times daily (Patient not taking: Reported on 12/15/2021) 60 tablet 0     No current facility-administered medications for this visit. Allergies:  Shellfish-derived products     Review of Systems:   Review of Systems   Constitutional: Positive for fatigue. Negative for chills and fever. HENT: Negative for congestion, rhinorrhea and sore throat. Eyes: Negative for photophobia, pain and visual disturbance. Respiratory: Negative for cough and shortness of breath. Cardiovascular: Negative for chest pain, palpitations and leg swelling. Gastrointestinal: Negative for abdominal pain, blood in stool, constipation, diarrhea, nausea and vomiting. Endocrine: Negative for cold intolerance and heat intolerance. Genitourinary: Negative for difficulty urinating, dysuria and hematuria. Musculoskeletal: Negative for arthralgias, joint swelling and myalgias. Skin: Negative for rash and wound. Allergic/Immunologic: Negative for environmental allergies and food allergies. Neurological: Positive for dizziness, weakness and light-headedness. Negative for syncope. Hematological: Negative for adenopathy. Does not bruise/bleed easily. Psychiatric/Behavioral: Negative for dysphoric mood. The patient is not nervous/anxious. Objective:   PHYSICAL EXAM:    Vitals:    12/15/21 1528   BP: (!) 102/50   Pulse: 97   SpO2: 98%      Weight: 142 lb (64.4 kg)     Wt Readings from Last 3 Encounters:   12/15/21 142 lb (64.4 kg)   11/09/21 150 lb 12.8 oz (68.4 kg)   10/27/21 154 lb (69.9 kg)     Orthostatic vitals:  Lying:  /64 HR 90  Sitting:  /52 HR 97  Standing: /50 HR 97    General: Elderly female in no acute distress. Pleasant and interactive on exam.  HEENT: Normocephalic, atraumatic, non-icteric, hearing intact, nares normal, mucous membranes moist.  Neck: No JVD. Heart: Irregularly irregular rate and rhythm. Normal S1 and S2. Grade II/VI holosystolic murmur. No rubs or gallops. Lungs: Normal respiratory effort. Clear to auscultation bilaterally. No wheezes, rales, or rhonchi. Abdomen: Soft, non-tender. Normoactive bowel sounds. No masses or organomegaly. Skin: No rashes, wounds, or lesions. Pulses: 2+ and symmetric. Extremities: No clubbing, cyanosis, or edema. Psych: Normal mood and affect. Neuro: Alert and oriented to person, place, and time. No focal deficits noted.       LABS   CBC:      Lab Results   Component Value Date    WBC 16.7 11/09/2021    RBC 3.98 11/09/2021    HGB 12.0 11/09/2021    HCT 35.2 11/09/2021    MCV 88.4 11/09/2021    RDW 13.8 11/09/2021     11/09/2021     CMP:  Lab Results   Component Value Date    NA 133 11/09/2021    K 3.9 11/09/2021    K 3.7 11/04/2021    CL 95 11/09/2021    CO2 27 11/09/2021    BUN 13 11/09/2021    CREATININE 0.8 11/09/2021    GFRAA >60 11/09/2021    GFRAA >60 02/25/2010    AGRATIO 0.8 11/01/2021    LABGLOM >60 11/09/2021    GLUCOSE 106 11/09/2021    PROT 5.8 11/01/2021    PROT 7.6 02/25/2010    CALCIUM 8.6 11/09/2021    BILITOT 1.4 11/01/2021    ALKPHOS 99 11/01/2021    AST 26 11/01/2021    ALT 28 11/01/2021     PT/INR:   No results found for: PTINR  Liver:  No components found for: CHLPL  Lab Results   Component Value Date    ALT 28 11/01/2021    AST 26 11/01/2021    ALKPHOS 99 11/01/2021    BILITOT 1.4 (H) 11/01/2021     Lab Results   Component Value Date    LABA1C 5.7 10/31/2021     Lipids:       No results found for: TRIG       No results found for: HDL       No results found for: LDLCALC       No results found for: LABVLDL      CARDIAC DATA   LAST EKG 12/15/21: Atrial fibrillation, inferolateral ST depression    Echo:  TTE 3/2/05:  RESULTS:    1. Technically adequate study. 2.  Left ventricular function is abnormal.  There are septal and       lateral hypokinesis. Estimated ejection fraction is 40% to 45%. 3. Danny [place] is no chamber enlargement. Danny [place] is no ventricular       hypertrophy.  The right sided chambers are not enlarged. There       is mild left atrial enlargement. 4.  The aortic, mitral, tricuspid and pulmonary valves function       adequately. Danny [place] is no evidence of valvular stenosis or       prolapse. 5.  No thrombus, mass or effusions were seen. 6.  Doppler interrogation revealed normal antegrade velocities.  The       mean gradient across the aortic valve is 4.6 mm Hg. Color Doppler       demonstrated mild mitral and tricuspid regurgitation.  Right       ventricular systolic pressure was 25 mm Hg.      TTE 9/11/14:  Study Conclusions   - Left ventricle:  The cavity size was normal. Wall thickness     was normal. Systolic function was normal. The estimated     ejection fraction was in the range of 60% to 65%. Wall     motion was normal; there were no regional wall motion     abnormalities. Doppler parameters are consistent with     abnormal left ventricular relaxation (grade 1 diastolic     dysfunction). Mitral valve: Mildly calcified annulus.     Mildly thickened leaflets. Left atrium: The atrium was     mildly dilated. Inferior vena cava: The vessel was normal     in size; the respirophasic diameter changes were in the     normal range (= 50%); findings are consistent with normal     central venous pressure. TTE 3/12/18:  Study Conclusions   - Left ventricle: The cavity size was normal. There was mild     concentric hypertrophy. Systolic function was normal. The     estimated ejection fraction was in the range of 54% to 58%. Wall     motion was normal; there were no regional wall motion     abnormalities. Doppler parameters are consistent with abnormal     left ventricular relaxation (grade 1 diastolic dysfunction). The     global longitudinal strain was -19%. - Mitral valve: The annulus was mildly calcified. The leaflets were     mildly thickened. Leaflet separation was reduced. Mobility was     restricted. There was mild regurgitation directed posteriorly. - Left atrium: The atrium was mildly to moderately dilated. - Inferior vena cava: The vessel was normal in size; the     respirophasic diameter changes were in the normal range (>= 50%);     findings are consistent with normal central venous pressure. TTE 6/23/20:  Study Conclusions   - Left ventricle: The cavity size is normal. There is mild focal     basal hypertrophy. There is hypertrophy of the septum. Systolic     function was normal. The estimated ejection fraction was in the   Filiberto of 52% to 57%. Wall motion was normal; there were no     regional wall motion abnormalities.  Features are consistent with     a pseudonormal left ventricular filling pattern, with concomitant   abnormal relaxation and increased filling pressure (grade 2     diastolic dysfunction). The global longitudinal strain was -22%. - Aortic valve: Thickening, consistent with sclerosis. - Mitral valve: Leaflet separation was reduced. Mobility was     restricted. There was mild to moderate regurgitation.   - Left atrium: The atrium is markedly dilated. - Inferior vena cava: The vessel was normal in size; the     respirophasic diameter changes were in the normal range (&gt;= 50%);     findings are consistent with normal central venous pressure. TTE 11/3/21:  Summary   Technically difficult examination. Global left ventricular function is moderately decreased with ejection   fraction estimated at 35-40%. EF by Medina's method estimated at 35%. Regional wall motion abnormalities cannot be ruled out due to irregular   heart rate. Diastolic dysfunction grade and filing pressure are indeterminate. The left atrium is severely dilated. Right ventricular systolic function is reduced . Aortic valve appears sclerotic but opens adequately. Mitral annular calcification is present, more prominent on the posterior   mitral valve leaflet. Mild to moderate (posteriorly eccentric) mitral regurgitation. Mild pulmonic regurgitation. Moderate tricuspid valve regurgitation. Trace aortic valve regurgitation. Systolic pulmonary artery pressure (SPAP) estimated at 44 mmHg (right atrial   pressure 3 mmHg), consistent with mild pulmonary hypertension. Irregular heart rate throughout study.        Cath:  [facility] 4/6/05:  FINDINGS:     CORONARY ARTERIOGRAPHY: Coronary arteriography of this   co-dominant system revealed no significant stenoses of the LMCA,   LAD, left circumflex or RCA. LEFT VENTRICULOGRAPHY: Left ventriculogram in the RANGEL projection   in the setting of ectopy revealed normal left ventricular   function with an estimated ejection fraction of greater then 50%   and trace mitral regurgitation. IMPRESSION: No significant coronary artery disease, probably   normal left ventricular function. PLAN:   Medical therapy. We will arrange a MUGA to definitively assess LV function. CATH:   [facility] 11/5/21:  Findings:  1. Hemodynamics:  A. Right heart catheterization                   1. RA: 8 mmHg                   2. RV: 42/8 mmHg                   3. PA: 40/24 (30) mmHg                   4. PCWP: 17 mmHg                   5. Saturations: AO 92%, RA 59%, PA 56%                   6. Danish CO: 4.48 L/min                   7. Danish CI: 2.43 L/min*m2                   8. Danish SVR: 1,625 dyne*sec/cm5                   9. Danish PVR: 232 dyne*sec/cm5              B. Opening arterial pressure: 134/76 (99) mmHg              C. LVEDP: 15 mmHg     2. Coronary anatomy:  A. Left main artery: The left main artery bifurcates into the left anterior descending artery and left circumflex artery. The left main artery has minor luminal irregularities. .  B. Left anterior descending artery: Transapical vessel which gives rise to 2 small diagonal arteries. The LAD has a 20% ostial stenosis, eccentric 40% proximal stenosis, and 30% mid-vessel stenosis. C. Left circumflex artery: Non-dominant vessel that gives rise to 3 obtuse marginal arteries and a posterolateral branch. The LCx has a 20% ostial stenosis. The remainder of the vessel is relatively free of disease. The OM1 is a very small vessel with a high origin. The OM2 is a large branching vessel with no angiographically significant disease. The left posterolateral branch has minor luminal irregularities. D. Right coronary artery: Dominant vessel. The RCA is medium caliber and has minor luminal irregularities. The right posterior descending artery has minor luminal irregularities. The right posterolateral branch has minor luminal irregularities. Assessment:     1. Persistent atrial fibrillation - Ventricular rate now better controlled. [id number] is 5. Remains on Eliquis. Will arrange for elective ROBERTO/DCCV. 2. Biventricular systolic heart failure/non-ischemic cardiomyopathy - LVEF 35-40% on TTE from 11/3/21. Suspect underlying cardiomyopathy was tachycardia-induced in setting of atrial fibrillation with RVR. Recent invasive coronary angiography demonstrated non-obstructive disease. Currently appears clinically euvolemic. 3. Lightheadedness - Likely secondary to orthostatic hypotension. Orthostatic vitals obtained today in clinic were notable for 14 mmHg decrease in diastolic BP when going from lying to standing. 4. Fatigue/weakness - Suspect that symptoms are primarily attributable deconditioning following recent prolonged hospitalization and multiple infections. Possible that patient's underlying atrial fibrillation and recent mild hypotension are contributing to symptoms as well. 5. Essential hypertension - BP currently in 110s/50s in clinic today. 6. Mild pulmonary hypertension - Secondary to left-sided heart failure. 7. Non-obstructive coronary artery disease  8. Mild-moderate mitral regurgitation  9. Former tobacco use    Plan:     1. Patient is now apparently planning to have EGD performed and OK to hold Eliquis for 3 days prior to procedure as needed. It should then be resumed as soon as possible following procedure. 2. Will schedule for ROBERTO/DCCV in ~1 month. 3. Given recent orthostatic hypotension, will change PO lasix 20 mg to as needed dosing for weight gain 3 lbs in one day or 5 lbs over 3 days. 4. Continue aspirin 81 mg daily, atorvastatin 40 mg qHS, metoprolol succinate 25 mg BID, lisinopril 5 mg daily, and digoxin 125 mcg daily. 5. Will need repeat TTE to re-assess cardiac function after patient has been cardioverted and treated with 3 months of guideline-directed medical therapy. 6. Discussed importance of heart healthy, low sodium diet. 7. Follow-up with me after cardioversion. Dwayneibe's attestation:   This note was scribed in the presence of Dr. Pascual Pascual, DO by Radha Correa LPN. It is a pleasure to assist in the care of Susie Jurist. Please call with any questions. The scribes documentation has been prepared under my direction and personally reviewed by me in its entirety. I confirm that the note above accurately reflects all work, treatment, procedures, and medical decision making performed by me. I, Dr. Pascual Pascual, personally performed the services described in this documentation as scribed by Radha Correa LPN in my presence, and it is both accurate and complete to the best of our ability.        Pascual Pascual, [address] [facility]  [phone] [facility]  [phone] [facility]

## 2021-12-15 ENCOUNTER — OFFICE VISIT (OUTPATIENT)
Dept: CARDIOLOGY CLINIC | Age: 81
End: 2021-12-15
Payer: COMMERCIAL

## 2021-12-15 VITALS
SYSTOLIC BLOOD PRESSURE: 102 MMHG | BODY MASS INDEX: 22.82 KG/M2 | DIASTOLIC BLOOD PRESSURE: 50 MMHG | WEIGHT: 142 LBS | OXYGEN SATURATION: 98 % | HEART RATE: 97 BPM | HEIGHT: 66 IN

## 2021-12-15 DIAGNOSIS — Z87.891 FORMER TOBACCO USE: ICD-10-CM

## 2021-12-15 DIAGNOSIS — I50.22 CHRONIC SYSTOLIC HEART FAILURE (HCC): ICD-10-CM

## 2021-12-15 DIAGNOSIS — R53.83 OTHER FATIGUE: ICD-10-CM

## 2021-12-15 DIAGNOSIS — R53.1 GENERALIZED WEAKNESS: ICD-10-CM

## 2021-12-15 DIAGNOSIS — I42.8 NON-ISCHEMIC CARDIOMYOPATHY (HCC): ICD-10-CM

## 2021-12-15 DIAGNOSIS — I27.20 PULMONARY HYPERTENSION (HCC): ICD-10-CM

## 2021-12-15 DIAGNOSIS — R42 LIGHTHEADEDNESS: ICD-10-CM

## 2021-12-15 DIAGNOSIS — I10 ESSENTIAL HYPERTENSION: ICD-10-CM

## 2021-12-15 DIAGNOSIS — I34.0 MITRAL VALVE INSUFFICIENCY, UNSPECIFIED ETIOLOGY: ICD-10-CM

## 2021-12-15 DIAGNOSIS — I48.19 PERSISTENT ATRIAL FIBRILLATION (HCC): Primary | ICD-10-CM

## 2021-12-15 DIAGNOSIS — I95.1 ORTHOSTATIC HYPOTENSION: ICD-10-CM

## 2021-12-15 PROCEDURE — 99214 OFFICE O/P EST MOD 30 MIN: CPT | Performed by: INTERNAL MEDICINE

## 2021-12-15 PROCEDURE — 93000 ELECTROCARDIOGRAM COMPLETE: CPT | Performed by: INTERNAL MEDICINE

## 2021-12-15 NOTE — PATIENT INSTRUCTIONS
1. Hold Eliquis 3 days prior to  EGD procedure. Start taking Eliquis as soon as you have completed EGD procedure. Low to intermediate risk from a cardiac standpoint, may proceed with EGD. 2. Take lasix medication if you gain 3 lbs in 1 day or 5 lbs in the course of a week. 3. Cardioversion in 1 month, January 2022. Call [address] [place] [address] to schedule this.   4.  Follow up-post cardioversion procedure

## 2021-12-15 NOTE — TELEPHONE ENCOUNTER
Called and spoke with murphy at  center and let her know per [facility] office note, pt is clear for procedure.  Faxed cardiac clearance letter also

## 2021-12-18 RX ORDER — ATORVASTATIN CALCIUM 40 MG/1
40 TABLET, FILM COATED ORAL NIGHTLY
Qty: 90 TABLET | Refills: 3 | Status: ON HOLD | OUTPATIENT
Start: 2021-12-18 | End: 2022-01-17 | Stop reason: ALTCHOICE

## 2021-12-18 RX ORDER — ASPIRIN 81 MG/1
81 TABLET, CHEWABLE ORAL DAILY
Qty: 90 TABLET | Refills: 3 | Status: SHIPPED | OUTPATIENT
Start: 2021-12-18 | End: 2022-04-17

## 2021-12-21 ENCOUNTER — TELEPHONE (OUTPATIENT)
Dept: CARDIOLOGY CLINIC | Age: 81
End: 2021-12-21

## 2021-12-21 NOTE — TELEPHONE ENCOUNTER
Pt stated over the last couple of days her BP has been high. 12/20- 140/76  12/21- 144/76  Pt stated she is feeling fine but is concerned about the systolic number. Pt stated she is going to have a cardioversion in January and does not want any issues prior.  Pt wants to know if her medications need adjusted etc.

## 2021-12-27 RX ORDER — LISINOPRIL 5 MG/1
5 TABLET ORAL 2 TIMES DAILY
Qty: 180 TABLET | Refills: 3 | Status: SHIPPED | OUTPATIENT
Start: 2021-12-27 | End: 2022-01-21 | Stop reason: SINTOL

## 2021-12-27 NOTE — TELEPHONE ENCOUNTER
Spoke with patient. High blood pressure, no symptoms presently. States left lower extremity swelling and discomfort resolved with elevation and Lasix. Inc lisinopril 10 mg once daily. Continue to track BP's at home. She will call in 1 week if fails to bring under control or if new symptoms. Advised that she will be receiving call to schedule cardioversion.     Sherrell Loya MD, [address] [facility]  [phone] [address] [facility]  [phone] [facility]

## 2021-12-28 ENCOUNTER — TELEPHONE (OUTPATIENT)
Dept: CARDIOLOGY CLINIC | Age: 81
End: 2021-12-28

## 2021-12-28 NOTE — TELEPHONE ENCOUNTER
Spoke with patient. She wasn't sure of the date of her EGD. I call Kellie CREWS (Dr. Flor Lovelace) @ [phone]. She had EGD on 12/17/21. She couldn't see what f/u was needed so I LM for return call from MA/RN. Notified patient and we will look to schedule CV around 1/14. Please verify my date is correct if we choose 1/14 with her medications.

## 2021-12-28 NOTE — TELEPHONE ENCOUNTER
Patient called the office asking to schedule a cardioversion. Says the best number to reach her is [phone].

## 2022-01-05 ENCOUNTER — TELEPHONE (OUTPATIENT)
Dept: CARDIOLOGY CLINIC | Age: 82
End: 2022-01-05

## 2022-01-05 NOTE — TELEPHONE ENCOUNTER
Spoke with patient after speaking with Harry CREWS. Patient is scheduled with Dr. Felix Galdamez for Cardioversion with anesthesia on 1/17/22 at [facility], arrival time of 7:30am to the Cath Lab. Please have patient arrive to the main entrance of [facility] and check in with the registration desk. Please call patient regarding medication instructions. Remind patient to be NPO after midnight (8 hours prior). Do not apply lotions/creams on skin the day of procedure.     COVID testing - RAPID or   Pre-Procedure Process   We are asking the physicians' offices to encourage all patients to obtain a COVID test prior to their procedure (within 6 days of their scheduled date)   The PAT RN will encourage the patient to obtain a COVID test prior to their procedure (within 6 days of their scheduled date)   This can be done at their Primary Care Doctor, [address] [place] [address], Urgent Care, [facility], [facility], etc.   A PCR or Rapid test result will be accepted        [address] [facility]/Walk in ([address] [facility] main entrance of hospital)  Monday - Friday 8am-5pm

## 2022-01-10 NOTE — TELEPHONE ENCOUNTER
Informed patient to HOLD lasix that morning. Morning meds okay with a sip of water only. No lotions/creams. Patient WINNIE.

## 2022-01-11 ENCOUNTER — ANESTHESIA EVENT (OUTPATIENT)
Dept: CARDIAC CATH/INVASIVE PROCEDURES | Age: 82
End: 2022-01-11
Payer: COMMERCIAL

## 2022-01-11 ASSESSMENT — ENCOUNTER SYMPTOMS: SHORTNESS OF BREATH: 0

## 2022-01-17 ENCOUNTER — ANESTHESIA (OUTPATIENT)
Dept: CARDIAC CATH/INVASIVE PROCEDURES | Age: 82
End: 2022-01-17
Payer: COMMERCIAL

## 2022-01-17 ENCOUNTER — HOSPITAL ENCOUNTER (OUTPATIENT)
Dept: CARDIAC CATH/INVASIVE PROCEDURES | Age: 82
Discharge: HOME | End: 2022-01-17
Attending: INTERNAL MEDICINE | Admitting: INTERNAL MEDICINE
Payer: COMMERCIAL

## 2022-01-17 VITALS — HEIGHT: 67 IN | BODY MASS INDEX: 22.99 KG/M2 | WEIGHT: 146.5 LBS

## 2022-01-17 VITALS — SYSTOLIC BLOOD PRESSURE: 127 MMHG | DIASTOLIC BLOOD PRESSURE: 72 MMHG | OXYGEN SATURATION: 99 %

## 2022-01-17 LAB
ANION GAP SERPL CALCULATED.3IONS-SCNC: 12 MMOL/L (ref 3–16)
BUN BLDV-MCNC: 20 MG/DL (ref 7–20)
CALCIUM SERPL-MCNC: 9.2 MG/DL (ref 8.3–10.6)
CHLORIDE BLD-SCNC: 102 MMOL/L (ref 99–110)
CO2: 24 MMOL/L (ref 21–32)
CREAT SERPL-MCNC: 1 MG/DL (ref 0.6–1.2)
EKG ATRIAL RATE: 73 BPM
EKG ATRIAL RATE: 79 BPM
EKG DIAGNOSIS: NORMAL
EKG DIAGNOSIS: NORMAL
EKG P AXIS: 73 DEGREES
EKG P-R INTERVAL: 240 MS
EKG Q-T INTERVAL: 368 MS
EKG Q-T INTERVAL: 388 MS
EKG QRS DURATION: 86 MS
EKG QRS DURATION: 98 MS
EKG QTC CALCULATION (BAZETT): 397 MS
EKG QTC CALCULATION (BAZETT): 427 MS
EKG R AXIS: 53 DEGREES
EKG R AXIS: 70 DEGREES
EKG T AXIS: -34 DEGREES
EKG T AXIS: -48 DEGREES
EKG VENTRICULAR RATE: 70 BPM
EKG VENTRICULAR RATE: 73 BPM
GFR AFRICAN AMERICAN: >60
GFR NON-AFRICAN AMERICAN: 53
GLUCOSE BLD-MCNC: 113 MG/DL (ref 70–99)
HCT VFR BLD CALC: 38 % (ref 36–48)
HEMOGLOBIN: 12.3 G/DL (ref 12–16)
LV EF: 45 %
LVEF MODALITY: NORMAL
MCH RBC QN AUTO: 28.6 PG (ref 26–34)
MCHC RBC AUTO-ENTMCNC: 32.4 G/DL (ref 31–36)
MCV RBC AUTO: 88.1 FL (ref 80–100)
PDW BLD-RTO: 14.8 % (ref 12.4–15.4)
PLATELET # BLD: 308 K/UL (ref 135–450)
PMV BLD AUTO: 6.7 FL (ref 5–10.5)
POTASSIUM SERPL-SCNC: 3.8 MMOL/L (ref 3.5–5.1)
RBC # BLD: 4.32 M/UL (ref 4–5.2)
SODIUM BLD-SCNC: 138 MMOL/L (ref 136–145)
WBC # BLD: 10 K/UL (ref 4–11)

## 2022-01-17 PROCEDURE — 93325 DOPPLER ECHO COLOR FLOW MAPG: CPT

## 2022-01-17 PROCEDURE — 80048 BASIC METABOLIC PNL TOTAL CA: CPT

## 2022-01-17 PROCEDURE — 3700000000 HC ANESTHESIA ATTENDED CARE

## 2022-01-17 PROCEDURE — 92960 CARDIOVERSION ELECTRIC EXT: CPT | Performed by: INTERNAL MEDICINE

## 2022-01-17 PROCEDURE — 93005 ELECTROCARDIOGRAM TRACING: CPT | Performed by: INTERNAL MEDICINE

## 2022-01-17 PROCEDURE — 3700000001 HC ADD 15 MINUTES (ANESTHESIA)

## 2022-01-17 PROCEDURE — 93320 DOPPLER ECHO COMPLETE: CPT

## 2022-01-17 PROCEDURE — 6360000002 HC RX W HCPCS: Performed by: NURSE ANESTHETIST, CERTIFIED REGISTERED

## 2022-01-17 PROCEDURE — 2500000003 HC RX 250 WO HCPCS: Performed by: NURSE ANESTHETIST, CERTIFIED REGISTERED

## 2022-01-17 PROCEDURE — 92960 CARDIOVERSION ELECTRIC EXT: CPT

## 2022-01-17 PROCEDURE — 93315 ECHO TRANSESOPHAGEAL: CPT

## 2022-01-17 PROCEDURE — 93010 ELECTROCARDIOGRAM REPORT: CPT | Performed by: INTERNAL MEDICINE

## 2022-01-17 PROCEDURE — 85027 COMPLETE CBC AUTOMATED: CPT

## 2022-01-17 RX ORDER — SODIUM CHLORIDE 0.9 % (FLUSH) 0.9 %
10 SYRINGE (ML) INJECTION EVERY 12 HOURS SCHEDULED
Status: DISCONTINUED | OUTPATIENT
Start: 2022-01-17 | End: 2022-01-17 | Stop reason: HOSPADM

## 2022-01-17 RX ORDER — SODIUM CHLORIDE 9 MG/ML
25 INJECTION, SOLUTION INTRAVENOUS PRN
Status: DISCONTINUED | OUTPATIENT
Start: 2022-01-17 | End: 2022-01-17 | Stop reason: HOSPADM

## 2022-01-17 RX ORDER — PROPOFOL 10 MG/ML
INJECTION, EMULSION INTRAVENOUS PRN
Status: DISCONTINUED | OUTPATIENT
Start: 2022-01-17 | End: 2022-01-17 | Stop reason: SDUPTHER

## 2022-01-17 RX ORDER — SODIUM CHLORIDE 0.9 % (FLUSH) 0.9 %
10 SYRINGE (ML) INJECTION PRN
Status: DISCONTINUED | OUTPATIENT
Start: 2022-01-17 | End: 2022-01-17 | Stop reason: HOSPADM

## 2022-01-17 RX ORDER — SODIUM CHLORIDE 9 MG/ML
INJECTION, SOLUTION INTRAVENOUS CONTINUOUS
Status: DISCONTINUED | OUTPATIENT
Start: 2022-01-17 | End: 2022-01-17 | Stop reason: HOSPADM

## 2022-01-17 RX ORDER — SODIUM CHLORIDE, SODIUM LACTATE, POTASSIUM CHLORIDE, CALCIUM CHLORIDE 600; 310; 30; 20 MG/100ML; MG/100ML; MG/100ML; MG/100ML
INJECTION, SOLUTION INTRAVENOUS CONTINUOUS
Status: DISCONTINUED | OUTPATIENT
Start: 2022-01-17 | End: 2022-01-17 | Stop reason: HOSPADM

## 2022-01-17 RX ORDER — LIDOCAINE HYDROCHLORIDE 20 MG/ML
INJECTION, SOLUTION INFILTRATION; PERINEURAL PRN
Status: DISCONTINUED | OUTPATIENT
Start: 2022-01-17 | End: 2022-01-17 | Stop reason: SDUPTHER

## 2022-01-17 RX ADMIN — PROPOFOL 180 MG: 10 INJECTION, EMULSION INTRAVENOUS at 09:43

## 2022-01-17 RX ADMIN — SODIUM CHLORIDE: 9 INJECTION, SOLUTION INTRAVENOUS at 09:12

## 2022-01-17 RX ADMIN — LIDOCAINE HYDROCHLORIDE 60 MG: 20 INJECTION, SOLUTION INFILTRATION; PERINEURAL at 09:43

## 2022-01-17 ASSESSMENT — ENCOUNTER SYMPTOMS: SHORTNESS OF BREATH: 1

## 2022-01-17 NOTE — ANESTHESIA POSTPROCEDURE EVALUATION
Department of Anesthesiology  Postprocedure Note    Patient: Harmeet Ta  MRN: [id number]  YOB: 1940  Date of evaluation: 1/17/2022  Time:  1:17 PM     Procedure Summary     Date: 01/17/22 Room / Location: [facility] Cardiac Cath Lab    Anesthesia Start: 0911 Anesthesia Stop: 1010    Procedure: CARDIOVERSION Diagnosis:       Other persistent atrial fibrillation      Other persistent atrial fibrillation    Scheduled Providers:  Responsible Provider: Selam Lopez MD    Anesthesia Type: TIVA ASA Status: 3          Anesthesia Type: TIVA    Layla Phase I:      Layla Phase II:      Last vitals: Reviewed and per EMR flowsheets.        Anesthesia Post Evaluation    Patient location during evaluation: PACU  Level of consciousness: awake  Airway patency: patent  Nausea & Vomiting: no nausea  Complications: no  Cardiovascular status: blood pressure returned to baseline  Respiratory status: acceptable  Hydration status: euvolemic

## 2022-01-17 NOTE — ANESTHESIA PRE PROCEDURE
Department of Anesthesiology  Preprocedure Note       Name:  Daryl Zhang   Age:  [de-identified] y.o.  :  1940                                          MRN:  [id number]         Date:  2022      Surgeon: * Surgery not found *    Procedure:     Medications prior to admission:   Prior to Admission medications    Medication Sig Start Date End Date Taking? Authorizing Provider   lisinopril (PRINIVIL;ZESTRIL) 5 MG tablet Take 1 tablet by mouth 2 times daily 21  Venice Goodson MD   apixaban (ELIQUIS) 5 MG TABS tablet Take 1 tablet by mouth 2 times daily 21   Oli Fisherx, DO   aspirin 81 MG chewable tablet Take 1 tablet by mouth daily 21  Oli Fisherx, DO   atorvastatin (LIPITOR) 40 MG tablet Take 1 tablet by mouth nightly 21  Oli Fisherx, DO   furosemide (LASIX) 20 MG tablet TAKE 1 TABLET BY MOUTH DAILY 21  Oli Fisherx, DO   digoxin (LANOXIN) 125 MCG tablet TAKE 1 TABLET BY MOUTH DAILY 21  Oli Fisherx, DO   metoprolol succinate (TOPROL XL) 25 MG extended release tablet Take 1 tablet by mouth 2 times daily 21   Oli Garcia, DO   budesonide (ENTOCORT EC) 3 MG extended release capsule Take 3 mg by mouth every morning     Historical Provider, MD   levothyroxine (SYNTHROID) 112 MCG tablet Take 112 mcg by mouth Daily    Historical Provider, MD   escitalopram (LEXAPRO) 10 MG tablet Take 10 mg by mouth daily     Historical Provider, MD       Current medications:    Current Facility-Administered Medications   Medication Dose Route Frequency Provider Last Rate Last Admin    0.9 % sodium chloride infusion   IntraVENous Continuous Oli Fisherx, DO           Allergies:     Allergies   Allergen Reactions    Shellfish-Derived Products Shortness Of Breath and Hives       Problem List:    Patient Active Problem List   Diagnosis Code    Localized osteoarthrosis, lower leg M17.10    Tear of lateral cartilage or meniscus of knee, current S83.289A    Tear of medial meniscus of right knee S83.241A    Primary osteoarthritis of right knee M17.11    Sepsis (HCC) A41.9    Cardiomyopathy (HCC) I04.7    Acute systolic heart failure (HCC) I50.21    Dyspnea R06.00    Rapid atrial fibrillation (HCC) I48.91    Urinary tract infection with hematuria N39.0, R31.9    PVC (premature ventricular contraction) I49.3    Hypokalemia E87.6    COVID-19 U07.1    Acute renal failure (HCC) N17.9    Persistent atrial fibrillation (HCC) I48.19    Pulmonary hypertension (HCC) I27.20       Past Medical History:        Diagnosis Date    COVID-19 2021    Hypertension     Thyroid disease     hypothyroidism       Past Surgical History:        Procedure Laterality Date    HEMORRHOID SURGERY      HYSTERECTOMY      KNEE ARTHROSCOPY Right 3/10/2016    TONSILLECTOMY         Social History:    Social History     Tobacco Use    Smoking status: Former Smoker     Quit date: 3/7/1980     Years since quittin.8    Smokeless tobacco: Never Used   Substance Use Topics    Alcohol use: Yes     Alcohol/week: 12.0 - 14.0 standard drinks     Types: 12 - 14 Glasses of wine per week                                Counseling given: Not Answered      Vital Signs (Current):   Vitals:    22 0730   Weight: 146 lb 8 oz (66.5 kg)   Height: 5' 7\" (1.702 m)                                              BP Readings from Last 3 Encounters:   12/15/21 (!) 102/50   21 (!) 151/93   10/27/21 (!) 158/86       NPO Status:                                                                                 BMI:   Wt Readings from Last 3 Encounters:   22 146 lb 8 oz (66.5 kg)   12/15/21 142 lb (64.4 kg)   21 150 lb 12.8 oz (68.4 kg)     Body mass index is 22.95 kg/m².     CBC:   Lab Results   Component Value Date    WBC 16.7 2021    RBC 3.98 2021    HGB 12.0 2021    HCT 35.2 2021    MCV 88.4 2021    RDW 13.8 2021     2021       CMP: Lab Results   Component Value Date     11/09/2021    K 3.9 11/09/2021    K 3.7 11/04/2021    CL 95 11/09/2021    CO2 27 11/09/2021    BUN 13 11/09/2021    CREATININE 0.8 11/09/2021    GFRAA >60 11/09/2021    GFRAA >60 02/25/2010    AGRATIO 0.8 11/01/2021    LABGLOM >60 11/09/2021    GLUCOSE 106 11/09/2021    PROT 5.8 11/01/2021    PROT 7.6 02/25/2010    CALCIUM 8.6 11/09/2021    BILITOT 1.4 11/01/2021    ALKPHOS 99 11/01/2021    AST 26 11/01/2021    ALT 28 11/01/2021       POC Tests: No results for input(s): POCGLU, POCNA, POCK, POCCL, POCBUN, POCHEMO, POCHCT in the last 72 hours. Coags: No results found for: PROTIME, INR, APTT    HCG (If Applicable): No results found for: PREGTESTUR, PREGSERUM, HCG, HCGQUANT     ABGs: No results found for: PHART, PO2ART, DCG7BII, YRQ9LAE, BEART, F1AQOSCO     Type & Screen (If Applicable):  No results found for: LABABO, LABRH    Drug/Infectious Status (If Applicable):  No results found for: HIV, HEPCAB    COVID-19 Screening (If Applicable):   Lab Results   Component Value Date    COVID19 DETECTED 11/08/2021    COVID19 Not Detected 10/31/2021           Anesthesia Evaluation  Patient summary reviewed and Nursing notes reviewed no history of anesthetic complications:   Airway: Mallampati: II  TM distance: >3 FB   Neck ROM: full  Mouth opening: > = 3 FB Dental: normal exam         Pulmonary:   (+) shortness of breath:                             Cardiovascular:    (+) hypertension:, dysrhythmias: atrial fibrillation, CHF (EF  83%): systolic,                   Neuro/Psych:   Negative Neuro/Psych ROS              GI/Hepatic/Renal: Neg GI/Hepatic/Renal ROS       (-) GERD, liver disease and no renal disease       Endo/Other:    (+) hypothyroidism::., .    (-) diabetes mellitus               Abdominal:             Vascular: negative vascular ROS. Other Findings:           Anesthesia Plan      TIVA     ASA 3       Induction: intravenous.       Anesthetic plan and risks discussed with patient. Plan discussed with CRNA. All questions answered and agrees with plan.         Vonda Yi MD   1/17/2022

## 2022-01-17 NOTE — PROCEDURES
1/17/2022    PROCEDURE PERFORMED:     1. ROBERTO  2. Direct current cardioversion. INDICATIONS: Persistent atrial fibrillation    PROCEDURE: The patient was brought to the lab in a fasting, non-sedated state. IV propofol was administered by the anesthesiology team and after ensuring adequate sedation, a ROBERTO was performed and showed no evidence of a left atrial appendage thrombus. The ROBERTO probe was removed and a synchronized 200 Joules shock was delivered after which the patient converted to sinus rhythm. The patient tolerated the procedure well and made complete hemodynamic and neurovascular recovery. CONCLUSION: Successful direct current cardioversion. PLAN:   1. Discontinue digoxin. 2. Continue Eliquis 5 mg BID and metoprolol succinate 25 mg BID. 3. Follow-up with Vanessa Mathew in one month.       Pascual Pascual, [address]   [phone] [facility]   [phone] [facility]

## 2022-01-21 ENCOUNTER — TELEPHONE (OUTPATIENT)
Dept: CARDIOLOGY CLINIC | Age: 82
End: 2022-01-21

## 2022-01-21 RX ORDER — LOSARTAN POTASSIUM 25 MG/1
25 TABLET ORAL DAILY
Qty: 90 TABLET | Refills: 1 | Status: CANCELLED | OUTPATIENT
Start: 2022-01-21

## 2022-01-21 NOTE — TELEPHONE ENCOUNTER
Pt has been coughing constantly since starting the Lisinopril and wanted to know if she could go back to taking Losartan. BP was also high this morning. 170/70 HR 58. Pt would also like to know if she can switch from Metoprolol to Carvedilol. If [facility] is agreeable pt will need a refill on Carvedilol. [facility]erred pharmacy is [facility] @ [phone]. Pt had a cardioversion on 01/17/2022. Please advise. Pt will be leaving town tomorrow and would like a response from [facility] before she leaves.

## 2022-01-21 NOTE — TELEPHONE ENCOUNTER
Let's please have her stop lisinopril and start losartan 25 mg daily. I would like for her to continue on metoprolol for now. She has follow-up scheduled with me on 2/16/22 and we can re-evaluate things at that time.

## 2022-01-24 NOTE — TELEPHONE ENCOUNTER
Called and spoke with pt regarding [facility] message, pt stated someone has already called her and taken care of it and she picked up new medication yesterday

## 2022-02-01 NOTE — H&P
review of systems for this patient (see notes). Prior History of Anesthesia Complications:   none    Modified Mallampati:  II (soft palate, uvula, fauces visible)    ASA Classification:  Class 3 - A patient with severe systemic disease that limits activity but is not incapacitating    Layla Scale: Activity:  2 - Able to move 4 extremities voluntarily on command  Respiration:  2 - Able to breathe deeply and cough freely  Circulation:  2 - BP+/- 20mmHg of normal  Consciousness:  2 - Fully awake  Oxygen Saturation (color):  2 - Able to maintain oxygen saturation >92% on room air    Sedation/Anesthesia Plan:  Guard the patient's safety and welfare. Minimize physical discomfort and pain. Minimize negative psychological responses to treatment by providing sedation and analgesia and maximize the potential amnesia. Patient to meet pre-procedure discharge plan. Medication Planned:  IV propofol to be administered per anesthesiology team.  Please refer to their documentation for full details.     Patient is an appropriate candidate for plan of sedation:   yes      Electronically signed by Tiarra Garcia DO on 2/1/2022 at 11:46 AM

## 2022-02-02 ENCOUNTER — TELEPHONE (OUTPATIENT)
Dept: CARDIOLOGY CLINIC | Age: 82
End: 2022-02-02

## 2022-02-02 NOTE — TELEPHONE ENCOUNTER
Attempted to reach pt per [facility] message, left vm to call the office back
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
## 2022-02-04 NOTE — TELEPHONE ENCOUNTER
Spoke to patient. She states she is still feeling fatigued, irregular HR, and anxious.  BPM today. /70. She states she is currently in [place]. Advised patient to go to nearest ER as recommended by [facility]. She verbalized understanding, she states she will have ER reach out to office if need any information.

## 2022-02-08 NOTE — TELEPHONE ENCOUNTER
Pt stated that she went to the er. Pt stated that she felt better for about 24hrs. Pt states hr is 146 while resting and is exhausted.  Pt would like to know if there is medication that she can take to help control hr?

## 2022-02-09 RX ORDER — METOPROLOL SUCCINATE 50 MG/1
50 TABLET, EXTENDED RELEASE ORAL 2 TIMES DAILY
Qty: 180 TABLET | Refills: 1 | Status: SHIPPED | OUTPATIENT
Start: 2022-02-09 | End: 2022-02-16

## 2022-02-09 NOTE — TELEPHONE ENCOUNTER
Pt called and stated her BP has been trending down. Last night 02/08 BP was 118/70's. . Pt went to ED and they did another cardio version which only worked for a couple of hours. Pt is still out of town. Pt wants to know if there is something [facility] can call in to bring her HR down. If so preferred pharmacy is [facility] in [place], [address]. I added this pharmacy to preferred pharmacy list. Pt does not want to head home to [place] until she gets her HR under control. Please advise asap.

## 2022-02-09 NOTE — TELEPHONE ENCOUNTER
Can increase metoprolol succinate to 50 mg BID. However, if ventricular rate consistently remains above 120 bpm, she needs to go back to the ED. Can we please also attempt to get her outside records faxed over to us?     Thanks,  Aishwarya Perla

## 2022-02-16 ENCOUNTER — OFFICE VISIT (OUTPATIENT)
Dept: CARDIOLOGY CLINIC | Age: 82
End: 2022-02-16
Payer: COMMERCIAL

## 2022-02-16 ENCOUNTER — TELEPHONE (OUTPATIENT)
Dept: CARDIOLOGY CLINIC | Age: 82
End: 2022-02-16

## 2022-02-16 VITALS
DIASTOLIC BLOOD PRESSURE: 68 MMHG | WEIGHT: 144 LBS | SYSTOLIC BLOOD PRESSURE: 114 MMHG | BODY MASS INDEX: 22.6 KG/M2 | HEART RATE: 108 BPM | OXYGEN SATURATION: 98 % | HEIGHT: 67 IN

## 2022-02-16 DIAGNOSIS — Z87.891 FORMER TOBACCO USE: ICD-10-CM

## 2022-02-16 DIAGNOSIS — I50.22 CHRONIC SYSTOLIC HEART FAILURE (HCC): ICD-10-CM

## 2022-02-16 DIAGNOSIS — I42.8 NON-ISCHEMIC CARDIOMYOPATHY (HCC): ICD-10-CM

## 2022-02-16 DIAGNOSIS — I27.20 MILD PULMONARY HYPERTENSION (HCC): ICD-10-CM

## 2022-02-16 DIAGNOSIS — I48.19 PERSISTENT ATRIAL FIBRILLATION (HCC): Primary | ICD-10-CM

## 2022-02-16 DIAGNOSIS — I34.0 MITRAL VALVE INSUFFICIENCY, UNSPECIFIED ETIOLOGY: ICD-10-CM

## 2022-02-16 DIAGNOSIS — I10 ESSENTIAL HYPERTENSION: ICD-10-CM

## 2022-02-16 PROCEDURE — 99214 OFFICE O/P EST MOD 30 MIN: CPT | Performed by: INTERNAL MEDICINE

## 2022-02-16 PROCEDURE — 93000 ELECTROCARDIOGRAM COMPLETE: CPT | Performed by: INTERNAL MEDICINE

## 2022-02-16 RX ORDER — METOPROLOL SUCCINATE 50 MG/1
75 TABLET, EXTENDED RELEASE ORAL 2 TIMES DAILY
Qty: 180 TABLET | Refills: 3 | Status: SHIPPED | OUTPATIENT
Start: 2022-02-16 | End: 2022-04-08

## 2022-02-16 RX ORDER — NITROFURANTOIN 25; 75 MG/1; MG/1
100 CAPSULE ORAL 2 TIMES DAILY
Status: ON HOLD | COMMUNITY
End: 2022-03-16 | Stop reason: HOSPADM

## 2022-02-16 RX ORDER — LOSARTAN POTASSIUM 50 MG/1
50 TABLET ORAL DAILY
COMMUNITY
End: 2022-04-08

## 2022-02-16 ASSESSMENT — ENCOUNTER SYMPTOMS
ABDOMINAL PAIN: 0
VOMITING: 0
SHORTNESS OF BREATH: 0
RHINORRHEA: 0
SORE THROAT: 0
EYE PAIN: 0
CONSTIPATION: 0
NAUSEA: 0
BLOOD IN STOOL: 0
COUGH: 0
PHOTOPHOBIA: 0
DIARRHEA: 0

## 2022-02-16 NOTE — PROGRESS NOTES
[address] [facility]   Cardiovascular Evaluation    PATIENT: Natalya Barrera  DATE: 2022  MRN: [id number]  CSN: [id number]  : 1940      Primary Care Doctor: Merlyn Priest MD  Reason for evaluation:   Follow-up for atrial fibrillation, non-ischemic cardiomyopathy    Subjective:     Alex Small is an 78-year-old female with a history of atrial fibrillation, biventricular systolic heart failure secondary to non-ischemic cardiomyopathy, essential hypertension, mitral regurgitation, mild pulmonary hypertension, and tobacco use who presents for follow-up. The patient previously received her cardiology care at [facility].  She was diagnosed with a mildly reduced LVEF of 40-45% on a TTE from [facility] in . At that time, she underwent invasive coronary angiography which demonstrated no angiographically significant coronary artery disease. Her LVEF had recovered to 60-65% on a repeat TTE from . She was initially evaluated by me in clinic on 10/27/21 for further evaluation of newly diagnosed atrial fibrillation. There were plans to proceed with elective ROBERTO/DCCV once she had an adequate period of anticoagulation with Eliquis. However, she was then admitted to [facility] from 10/30 - 21 with a UTI and sepsis. Both urine and blood cultures grew E. Coli and she was treated with an appropriate course of antibiotics. During her admission, she was in atrial fibrillation with RVR and a TTE obtained on 11/3/21 showed an LVEF of 35-40%, normal RV size with reduced RV systolic function, mild-moderate eccentric and posteriorly-directed mitral regurgitation, mild pulmonic regurgitation, moderate tricuspid regurgitation, and estimated SPAP of 44 mmHg. She underwent invasive coronary angiography/LHC/RHC on 21 which demonstrated non-obstructive coronary artery disease and the following hemodynamics: RA 8, RV 42/8, PA 40/24 (30), PCWP 17, Danish CO 4.48, and Danish CI 2.43.   LVEDP was 15 mmHg. She ultimately underwent ROBERTO and DCCV to sinus rhythm on 1/17/22. LVEF appeared to have improved to ~45% on ROBERTO. Shortly after her cardioversion, the patient called into our clinic and complained of a worsening cough since starting lisinopril and she was subsequently switched to losartan. She recently got back from [place] where she had been [address]. While there, she went back into atrial fibrillation with RVR and was found to have a UTI. She reportedly underwent repeat DCCV while in [place], but only stayed in sinus rhythm for a few hours. Her metoprolol succinate was increased to 50 mg BID. She states that she continues to have palpitations, but has overall been feeling better since coming home from [place]. She apparently decided to stop taking her statin. She denies any chest pain, shortness of breath, lightheadedness, dizziness, or lower extremity edema. Patient Active Problem List   Diagnosis    Localized osteoarthrosis, lower leg    Tear of lateral cartilage or meniscus of knee, current    Tear of medial meniscus of right knee    Primary osteoarthritis of right knee    Sepsis ([address]    Cardiomyopathy ([address]    Acute systolic heart failure (HCC)    Dyspnea    Rapid atrial fibrillation (HCC)    Urinary tract infection with hematuria    PVC (premature ventricular contraction)    Hypokalemia    COVID-19    Acute renal failure (HCC)    Persistent atrial fibrillation ([address]    Pulmonary hypertension ([address]         Past Medical History:   has a past medical history of COVID-19, Hypertension, and Thyroid disease. Surgical History:   has a past surgical history that includes Hemorrhoid surgery; Hysterectomy; Tonsillectomy; and Knee arthroscopy (Right, 3/10/2016). Social History:   reports that she quit smoking about 41 years ago. She has never used smokeless tobacco. She reports current alcohol use of about 12.0 - 14.0 standard drinks of alcohol per week.  She reports that she does not use drugs. Family History:  No evidence for sudden cardiac death or premature CAD. Home Medications:  Reviewed and are listed in nursing record. and/or listed below  Current Outpatient Medications   Medication Sig Dispense Refill    DIGOXIN PO Take by mouth      metoprolol succinate (TOPROL XL) 50 MG extended release tablet Take 1 tablet by mouth 2 times daily 180 tablet 1    apixaban (ELIQUIS) 5 MG TABS tablet Take 1 tablet by mouth 2 times daily 60 tablet 3    aspirin 81 MG chewable tablet Take 1 tablet by mouth daily 90 tablet 3    furosemide (LASIX) 20 MG tablet TAKE 1 TABLET BY MOUTH DAILY 90 tablet 3    levothyroxine (SYNTHROID) 112 MCG tablet Take 112 mcg by mouth Daily      escitalopram (LEXAPRO) 10 MG tablet Take 10 mg by mouth daily       metoprolol succinate (TOPROL XL) 25 MG extended release tablet Take 1 tablet by mouth 2 times daily (Patient not taking: Reported on 2/16/2022) 60 tablet 3    budesonide (ENTOCORT EC) 3 MG extended release capsule Take 3 mg by mouth every morning  (Patient not taking: Reported on 2/16/2022)       No current facility-administered medications for this visit. Allergies:  Shellfish-derived products     Review of Systems:   Review of Systems   Constitutional: Negative for chills and fever. HENT: Negative for congestion, rhinorrhea and sore throat. Eyes: Negative for photophobia, pain and visual disturbance. Respiratory: Negative for cough and shortness of breath. Cardiovascular: Positive for palpitations. Negative for chest pain and leg swelling. Gastrointestinal: Negative for abdominal pain, blood in stool, constipation, diarrhea, nausea and vomiting. Endocrine: Negative for cold intolerance and heat intolerance. Genitourinary: Negative for difficulty urinating, dysuria and hematuria. Musculoskeletal: Negative for arthralgias, joint swelling and myalgias. Skin: Negative for rash and wound. Allergic/Immunologic: Negative for environmental allergies and food allergies. Neurological: Negative for dizziness, syncope and light-headedness. Hematological: Negative for adenopathy. Does not bruise/bleed easily. Psychiatric/Behavioral: Negative for dysphoric mood. The patient is not nervous/anxious. Objective:   PHYSICAL EXAM:    Vitals:    02/16/22 1028   BP: 114/68   Pulse: 108   SpO2: 98%      Weight: 144 lb (65.3 kg)     Wt Readings from Last 3 Encounters:   02/16/22 144 lb (65.3 kg)   01/17/22 146 lb 8 oz (66.5 kg)   12/15/21 142 lb (64.4 kg)     General: Elderly female in no acute distress. Pleasant and interactive on exam.  HEENT: Normocephalic, atraumatic, non-icteric, hearing intact, nares normal, mucous membranes moist.  Neck: No JVD. Heart: Irregularly irregular rate and rhythm. Normal S1 and S2. Grade II/VI holosystolic murmur. No rubs or gallops. Lungs: Normal respiratory effort. Clear to auscultation bilaterally. No wheezes, rales, or rhonchi. Abdomen: Soft, non-tender. Normoactive bowel sounds. No masses or organomegaly. Skin: No rashes, wounds, or lesions. Pulses: 2+ and symmetric. Extremities: No clubbing, cyanosis, or edema. Psych: Normal mood and affect. Neuro: Alert and oriented to person, place, and time. No focal deficits noted.       LABS   CBC:      Lab Results   Component Value Date    WBC 10.0 01/17/2022    RBC 4.32 01/17/2022    HGB 12.3 01/17/2022    HCT 38.0 01/17/2022    MCV 88.1 01/17/2022    RDW 14.8 01/17/2022     01/17/2022     CMP:  Lab Results   Component Value Date     01/17/2022    K 3.8 01/17/2022    K 3.7 11/04/2021     01/17/2022    CO2 24 01/17/2022    BUN 20 01/17/2022    CREATININE 1.0 01/17/2022    GFRAA >60 01/17/2022    GFRAA >60 02/25/2010    AGRATIO 0.8 11/01/2021    LABGLOM 53 01/17/2022    GLUCOSE 113 01/17/2022    PROT 5.8 11/01/2021    PROT 7.6 02/25/2010    CALCIUM 9.2 01/17/2022    BILITOT 1.4 11/01/2021    ALKPHOS 99 11/01/2021    AST 26 11/01/2021    ALT 28 11/01/2021     PT/INR:   No results found for: PTINR  Liver:  No components found for: CHLPL  Lab Results   Component Value Date    ALT 28 11/01/2021    AST 26 11/01/2021    ALKPHOS 99 11/01/2021    BILITOT 1.4 (H) 11/01/2021     Lab Results   Component Value Date    LABA1C 5.7 10/31/2021     Lipids:       No results found for: TRIG       No results found for: HDL       No results found for: LDLCALC       No results found for: LABVLDL      CARDIAC DATA     LAST EKG 2/16/22: Atrial fibrillation with PVCs, non-specific ST abnormality     Echo:  TTE 3/2/05:  RESULTS:    1. Technically adequate study. 2.  Left ventricular function is abnormal.  There are septal and       lateral hypokinesis. Estimated ejection fraction is 40% to 45%. 3. Parth Motto is no chamber enlargement. Parth Motto is no ventricular       hypertrophy.  The right sided chambers are not enlarged. There       is mild left atrial enlargement. 4.  The aortic, mitral, tricuspid and pulmonary valves function       adequately. Parth Motto is no evidence of valvular stenosis or       prolapse. 5.  No thrombus, mass or effusions were seen. 6.  Doppler interrogation revealed normal antegrade velocities.  The       mean gradient across the aortic valve is 4.6 mm Hg. Color Doppler       demonstrated mild mitral and tricuspid regurgitation.  Right       ventricular systolic pressure was 25 mm Hg.      TTE 9/11/14:  Study Conclusions   - Left ventricle: The cavity size was normal. Wall thickness     was normal. Systolic function was normal. The estimated     ejection fraction was in the range of 60% to 65%. Wall     motion was normal; there were no regional wall motion     abnormalities. Doppler parameters are consistent with     abnormal left ventricular relaxation (grade 1 diastolic     dysfunction). Mitral valve: Mildly calcified annulus.     Mildly thickened leaflets.  Left atrium: The atrium was   [employer] dilated. Inferior vena cava: The vessel was normal     in size; the respirophasic diameter changes were in the     normal range (= 50%); findings are consistent with normal     central venous pressure. TTE 3/12/18:  Study Conclusions   - Left ventricle: The cavity size was normal. There was mild     concentric hypertrophy. Systolic function was normal. The     estimated ejection fraction was in the range of 54% to 58%. Wall     motion was normal; there were no regional wall motion     abnormalities. Doppler parameters are consistent with abnormal     left ventricular relaxation (grade 1 diastolic dysfunction). The     global longitudinal strain was -19%. - Mitral valve: The annulus was mildly calcified. The leaflets were     mildly thickened. Leaflet separation was reduced. Mobility was     restricted. There was mild regurgitation directed posteriorly. - Left atrium: The atrium was mildly to moderately dilated. - Inferior vena cava: The vessel was normal in size; the     respirophasic diameter changes were in the normal range (>= 50%);     findings are consistent with normal central venous pressure. TTE 6/23/20:  Study Conclusions   - Left ventricle: The cavity size is normal. There is mild focal     basal hypertrophy. There is hypertrophy of the septum. Systolic     function was normal. The estimated ejection fraction was in the   Filiberto of 52% to 57%. Wall motion was normal; there were no     regional wall motion abnormalities. Features are consistent with     a pseudonormal left ventricular filling pattern, with concomitant     abnormal relaxation and increased filling pressure (grade 2     diastolic dysfunction). The global longitudinal strain was -22%. - Aortic valve: Thickening, consistent with sclerosis. - Mitral valve: Leaflet separation was reduced. Mobility was     restricted. There was mild to moderate regurgitation.   - Left atrium: The atrium is markedly dilated.    - Inferior vena VENTRICULOGRAPHY: Left ventriculogram in the RANGEL projection   in the setting of ectopy revealed normal left ventricular   function with an estimated ejection fraction of greater then 50%   and trace mitral regurgitation. IMPRESSION: No significant coronary artery disease, probably   normal left ventricular function. PLAN:   Medical therapy. We will arrange a MUGA to definitively assess LV function. CATH:   [facility] 11/5/21:  Findings:  1. Hemodynamics:  A. Right heart catheterization                   1. RA: 8 mmHg                   2. RV: 42/8 mmHg                   3. PA: 40/24 (30) mmHg                   4. PCWP: 17 mmHg                   5. Saturations: AO 92%, RA 59%, PA 56%                   6. Danish CO: 4.48 L/min                   7. Danish CI: 2.43 L/min*m2                   8. Danish SVR: 1,625 dyne*sec/cm5                   9. Danish PVR: 232 dyne*sec/cm5              B. Opening arterial pressure: 134/76 (99) mmHg              C. LVEDP: 15 mmHg     2. Coronary anatomy:  A. Left main artery: The left main artery bifurcates into the left anterior descending artery and left circumflex artery. The left main artery has minor luminal irregularities. .  B. Left anterior descending artery: Transapical vessel which gives rise to 2 small diagonal arteries. The LAD has a 20% ostial stenosis, eccentric 40% proximal stenosis, and 30% mid-vessel stenosis. C. Left circumflex artery: Non-dominant vessel that gives rise to 3 obtuse marginal arteries and a posterolateral branch. The LCx has a 20% ostial stenosis. The remainder of the vessel is relatively free of disease. The OM1 is a very small vessel with a high origin. The OM2 is a large branching vessel with no angiographically significant disease. The left posterolateral branch has minor luminal irregularities. D. Right coronary artery: Dominant vessel. The RCA is medium caliber and has minor luminal irregularities.   The right posterior descending artery has minor luminal irregularities. The right posterolateral branch has minor luminal irregularities. Assessment:     1. Persistent atrial fibrillation - Patient has now undergone DCCV x2 is now back in atrial fibrillation. Ventricular rate is currently controlled, but she has had recurrent episodes of RVR during which she becomes quite symptomatic. [id number] is 5. Remains on Eliquis. 2. Biventricular systolic heart failure/non-ischemic cardiomyopathy - Suspect underlying cardiomyopathy was tachycardia-induced in setting of atrial fibrillation with RVR. Recent invasive coronary angiography demonstrated non-obstructive disease. Currently appears clinically euvolemic. 3. Essential hypertension - BP is currently controlled. 4. Mild pulmonary hypertension - Secondary to left-sided heart failure. 5. Non-obstructive coronary artery disease  6. Mild-moderate mitral regurgitation  7. Recurrent UTIs  8. Former tobacco use    Plan:     1. Given patient's recurrent symptomatic atrial fibrillation, will refer to EP to discuss initiation of antiarrhythmic therapy versus RFCA. 2. Increase metoprolol succinate to 75 mg BID. 3. Continue aspirin 81 mg daily, Eliquis 5 mg BID, losartan 50 mg daily, and digoxin 0.125 mg daily. 4. She has chosen to stop taking her statin. 5. Continue PO lasix 20 mg as needed for weight gain of 3 lbs in one day or 5 lbs over 3 days. 6. Encourage heart healthy, low sodium diet. 7. It is recommended that she follow-up with her PCP for further evaluation and treatment of her recurrent UTIs and she may benefit from additional evaluation by urology. 8. Follow-up with me in 6 months and will plan to repeat TTE at that time to re-evaluate cardiac function. Valdo Castellano RN, am scribing for and in the presence of Dr. Danish Garcia. 02/16/22 10:48 AM   Dom Esparza RN      It is a pleasure to assist in the care of Annabella Gonzalez. Please call with any questions.   The scribes documentation has been prepared under my direction and personally reviewed by me in its entirety. I confirm that the note above accurately reflects all work, treatment, procedures, and medical decision making performed by me. I, Dr. Hadley Parisi, personally performed the services described in this documentation as scribed by Javon Zuniga RN in my presence, and it is both accurate and complete to the best of our ability.        Hadley Parisi, [address] [facility]  [phone] [facility]  [phone] [address] [place] [address]

## 2022-02-16 NOTE — TELEPHONE ENCOUNTER
Pt was told to call to let [facility] know that she is taking Losartan 50 mg once daily for BP and Nitrofurantoin 100 mg for a UTI. TOMASZI.

## 2022-02-16 NOTE — PATIENT INSTRUCTIONS
Plan:     1. Discuss urology referral with your PCP for recurrent UTI's.  2. Referral to EP for AF management and options. 3/1/2022 at 1 PM  3. Increase metoprolol to 75 mg twice daily. 4. Recommend cholesterol medication (atorvastatin) if you are able to tolerate. 5. Let us know if you are taking lisinopril or losartan for BP- call our office. 6. Follow up in 6 months.

## 2022-02-18 RX ORDER — METOPROLOL SUCCINATE 50 MG/1
TABLET, EXTENDED RELEASE ORAL
Qty: 270 TABLET | OUTPATIENT
Start: 2022-02-18

## 2022-03-01 ENCOUNTER — TELEPHONE (OUTPATIENT)
Dept: CARDIOLOGY CLINIC | Age: 82
End: 2022-03-01

## 2022-03-01 ENCOUNTER — OFFICE VISIT (OUTPATIENT)
Dept: CARDIOLOGY CLINIC | Age: 82
End: 2022-03-01
Payer: COMMERCIAL

## 2022-03-01 VITALS
BODY MASS INDEX: 22.44 KG/M2 | WEIGHT: 143 LBS | SYSTOLIC BLOOD PRESSURE: 128 MMHG | HEART RATE: 91 BPM | OXYGEN SATURATION: 98 % | HEIGHT: 67 IN | DIASTOLIC BLOOD PRESSURE: 80 MMHG

## 2022-03-01 DIAGNOSIS — I48.19 PERSISTENT ATRIAL FIBRILLATION (HCC): ICD-10-CM

## 2022-03-01 DIAGNOSIS — I48.91 RAPID ATRIAL FIBRILLATION (HCC): Primary | ICD-10-CM

## 2022-03-01 DIAGNOSIS — B33.24 VIRAL CARDIOMYOPATHY (HCC): ICD-10-CM

## 2022-03-01 DIAGNOSIS — I49.3 PVC (PREMATURE VENTRICULAR CONTRACTION): ICD-10-CM

## 2022-03-01 PROCEDURE — 99204 OFFICE O/P NEW MOD 45 MIN: CPT | Performed by: INTERNAL MEDICINE

## 2022-03-01 PROCEDURE — 93000 ELECTROCARDIOGRAM COMPLETE: CPT | Performed by: INTERNAL MEDICINE

## 2022-03-01 NOTE — PATIENT INSTRUCTIONS
RECOMMENDATIONS:  1. Discussed at length treatment options for AF:   1. Medications to slow heart rate (goal of less than 90 at rest, less than 110 with mild activity)   2. Medications for rhythm control (amiodarone, dronedarone, and propafenone or dofetilide which requires hospital admission for four days). Discussed risks and benefits. 3. Ablation to burn the abnormal electrical activity within the heart. Discussed risks and benefits. 4. Pace and Ablate to put pacemaker in the heart and burn the electrical activity in the heart. Would be dependent upon pacemaker for the rest of your life. This would be a last resort. 5. Cardioversion to electrically shock heart back into normal rhythm. 2.  Continue Eliquis to prevent stroke in the presence of AF. 3.  Plan for Tikosyn admission (4 day hospital stay). 4.  Follow up after hospital stay. Patient Education        amiodarone (oral)  Pronunciation:  LILIA garay  Brand:  Pacerone  What is the most important information I should know about amiodarone? Amiodarone is for use only in treating life-threatening heart rhythm disorders. You should not take this medicine if you are allergic to amiodarone or iodine, or if you have heart block, a history of slow heartbeats that have caused you to faint, or if your heart cannot pump blood properly. Amiodarone can cause dangerous side effects on your heart, liver, lungs, or vision. Call your doctor or get medical help at once if you have: chest pain, fast or pounding heartbeats, trouble breathing, vision problems, upper stomach pain, vomiting, dark urine, jaundice (yellowing of the skin or eyes), or if you cough up blood. What is amiodarone? Amiodarone affects the rhythm of your heartbeats. Amiodarone is used to help keep the heart beating normally in people with life-threatening heart rhythm disorders of the ventricles (the lower chambers of the heart that allow blood to flow out of the heart). Amiodarone is used to treat ventricular tachycardia or ventricular fibrillation. Amiodarone is for use only in treating life-threatening heart rhythm disorders. Amiodarone may also be used for purposes not listed in this medication guide. What should I discuss with my healthcare provider before taking amiodarone? You should not use this medicine if you are allergic to amiodarone or iodine, or if you have:  · a serious heart condition called \"AV block\" (2nd or 3rd degree), unless you have a pacemaker;  · a history of slow heartbeats that have caused you to faint; or  · if your heart cannot pump blood properly. Amiodarone can cause dangerous side effects on your heart, liver, lungs, or thyroid. Tell your doctor if you have ever had:  · asthma or another lung disorder;  · liver disease;  · a thyroid disorder;  · vision problems;  · high or low blood pressure;  · an electrolyte imbalance (such as low levels of potassium or magnesium in your blood); or  · if you have a pacemaker or defibrillator implanted in your chest.  Taking amiodarone during pregnancy may harm an unborn baby, or cause thyroid problems or abnormal heartbeats in the baby after it is born. Amiodarone may also affect the child's growth or development (speech, movement, academic skills) later in life. Tell your doctor if you are pregnant or if you become pregnant. You should not breast-feed while taking amiodarone, and for several months after stopping. Amiodarone takes a long time to clear from your body. Talk to your doctor about the best way to feed your baby during this time. How should I take amiodarone? Follow all directions on your prescription label and read all medication guides or instruction sheets. Your doctor may occasionally change your dose. Use the medicine exactly as directed. You will receive your first few doses in a hospital setting, where your heart rhythm can be monitored.   If you have been taking another heart rhythm medicine, you may need to gradually stop taking it when you start using amiodarone. Follow your doctor's dosing instructions very carefully. You may take amiodarone with or without food, but take it the same way each time. It may take up to 3 weeks before your heart rhythm improves. Keep using the medicine as directed even if you feel well. Amiodarone can have long lasting effects on your body. You may need frequent medical tests while using this medicine and for several months after your last dose. If you need surgery (including laser eye surgery), tell the surgeon ahead of time that you are using amiodarone. This medicine can affect the results of certain medical tests. Tell any doctor who treats you that you are using amiodarone. Store at room temperature away from moisture, heat, and light. What happens if I miss a dose? Skip the missed dose and use your next dose at the regular time. Do not use two doses at one time. What happens if I overdose? Seek emergency medical attention or call the Poison Help line at 1-611.612.3049. An overdose of amiodarone can be fatal.  Overdose symptoms may include weakness, slow heart rate, feeling light-headed, or loss of consciousness. What should I avoid while taking amiodarone? Avoid driving or hazardous activity until you know how this medicine will affect you. Your reactions could be impaired. Grapefruit may interact with amiodarone and lead to unwanted side effects. Avoid the use of grapefruit products. Avoid taking an herbal supplement containing Koko's wort. Amiodarone could make you sunburn more easily. Avoid sunlight or tanning beds. Wear protective clothing and use sunscreen (SPF 30 or higher) when you are outdoors. What are the possible side effects of amiodarone? Get emergency medical help if you have signs of an allergic reaction: hives; difficulty breathing; swelling of your face, lips, tongue, or throat.   Amiodarone takes a long time to completely clear from your body. You may continue to have side effects from amiodarone after you stop using it. Call your doctor at once if you have any of these side effects, even if they occur up to several months after you stop using amiodarone:  · wheezing, cough, chest pain, cough with bloody mucus, fever;  · a new or a worsening irregular heartbeat pattern (fast, slow, or pounding heartbeats);  · a light-headed feeling, like you might pass out;  · blurred vision, seeing halos around lights (your eyes may be more sensitive to light);  · liver problems --nausea, vomiting, stomach pain (upper right side), tiredness, dark urine, jaundice (yellowing of the skin or eyes);  · nerve problems --loss of coordination, muscle weakness, uncontrolled muscle movement, or a prickly feeling in your hands or lower legs;  · signs of overactive thyroid --weight loss, thinning hair, feeling hot, increased sweating, tremors, feeling nervous or irritable, irregular menstrual periods, swelling in your neck (goiter); or  · signs of underactive thyroid --weight gain, tiredness, depression, trouble concentrating, feeling cold. Common side effects may include:  · nausea, vomiting, loss of appetite; or  · constipation. This is not a complete list of side effects and others may occur. Call your doctor for medical advice about side effects. You may report side effects to FDA at 1-798-FDA-5310. What other drugs will affect amiodarone? Sometimes it is not safe to use certain medications at the same time. Some drugs can affect your blood levels of other drugs you take, which may increase side effects or make the medications less effective. Amiodarone takes a long time to completely clear from your body. Drug interactions are possible for up to several months after you stop using amiodarone. Talk to your doctor before taking any medication during this time. Keep track of how long it has been since your last dose of amiodarone.   Many drugs can affect amiodarone. This includes prescription and over-the-counter medicines, vitamins, and herbal products. Not all possible interactions are listed here. Tell your doctor about all your current medicines and any medicine you start or stop using. Where can I get more information? Your doctor or pharmacist can provide more information about amiodarone. Remember, keep this and all other medicines out of the reach of children, never share your medicines with others, and use this medication only for the indication prescribed. Every effort has been made to ensure that the information provided by [address] is accurate, up-to-date, and complete, but no guarantee is made to that effect. Drug information contained herein may be time sensitive. [facility] information has been compiled for use by healthcare practitioners and consumers in the [place] and therefore [facility] does not warrant that uses outside of the [place] are appropriate, unless specifically indicated otherwise. [facility]'s drug information does not endorse drugs, diagnose patients or recommend therapy. [facility] drug information is an informational resource designed to assist licensed healthcare practitioners in caring for their patients and/or to serve consumers viewing this service as a supplement to, and not a substitute for, the expertise, skill, knowledge and judgment of healthcare practitioners. The absence of a warning for a given drug or drug combination in no way should be construed to indicate that the drug or drug combination is safe, effective or appropriate for any given patient. [facility] does not assume any responsibility for any aspect of healthcare administered with the aid of information [facility] provides. The information contained herein is not intended to cover all possible uses, directions, precautions, warnings, drug interactions, allergic reactions, or adverse effects.  If you have questions about the drugs you are taking, check with your doctor, nurse or pharmacist.  Copyright 3305-1917 167  John: 7.01. Revision date: 11/6/2018. Care instructions adapted under license by [facility] ([facility]). If you have questions about a medical condition or this instruction, always ask your healthcare professional. [address] any warranty or liability for your use of this information. Patient Education        dofetilide  Pronunciation:  mali FELICIANO sarabjit kong  Brand:  Lisa  What is the most important information I should know about dofetilide? You should not take dofetilide if you have severe kidney disease or a history of Long QT syndrome. Serious drug interactions can occur when certain medicines are used together with dofetilide. Tell each of your healthcare providers about all medicines you use now, and any medicine you start or stop using. You will need to spend at least 3 days in a hospital setting when you first start taking dofetilide. This is so your heart rhythm and kidney function can be monitored in case the medicine causes serious side effects. What is dofetilide? Dofetilide is a heart rhythm medicine, also called an antiarrhythmic. Dofetilide is used to help keep the heart beating normally in people with certain heart rhythm disorders of the atrium (the upper chambers of the heart that allow blood to flow into the heart). Dofetilide is used in people with atrial fibrillation or atrial flutter. Dofetilide may also be used for purposes not listed in this medication guide. What should I discuss with my health care provider before taking dofetilide? You should not take dofetilide if you are allergic to it, or if you have:  · severe kidney disease (or if you are on dialysis); or  · a history of Long QT syndrome. Some medicines can cause unwanted or dangerous effects when used with dofetilide, and should not be used at the same time.  Your doctor may need to change your treatment plan if you use any of the following drugs:  · cimetidine;  · dolutegravir;  · ketoconazole;  · megestrol;  · prochlorperazine;  · trimethoprim (Proloprim, Trimpex, Bactrim, Septra);  · verapamil; or  · a diuretic (water pill) that contains hydrochlorothiazide (HCTZ), such as Accuretic, Aldactazide, Atacand HCT, Benicar HCT, Diovan HCT, Dyazide, Exforge HCT, Hyzaar, Lopressor HCT, Maxzide, Micardis HCT, Monopril HCT, Prinzide, Tekturna HCT, Vaseretic, and others. To make sure dofetilide is safe for you, tell your doctor if you have:  · heart disease, high blood pressure;  · liver or kidney disease;  · depression, mental illness;  · asthma or allergies;  · any active infection;  · skin problems; or  · an electrolyte imbalance (such as low levels of potassium or magnesium in your blood). It is not known whether this medicine will harm an unborn baby. Tell your doctor if you are pregnant or plan to become pregnant while using this medicine. It is not known whether dofetilide passes into breast milk or if it could harm a nursing baby. You should not breast-feed while you are using dofetilide. How should I take dofetilide? Dofetilide is available only from a hospital or specialty pharmacy. You will need to spend at least 3 days in a hospital setting when you first start taking dofetilide. This is so your heart rhythm and kidney function can be monitored in case the medicine causes serious side effects. Follow all directions on your prescription label. Do not take this medicine in larger or smaller amounts or for longer than recommended. You may take dofetilide with or without food. You should not skip doses or stop using dofetilide suddenly. Stopping suddenly may make your condition worse. Follow your doctor's instructions about tapering your dose. Tell your doctor if you have a prolonged illness that causes severe diarrhea, vomiting, or heavy sweating.  These conditions can cause an electrolyte imbalance, making it dangerous for you to use dofetilide. Your blood pressure will need to be checked often. Your kidney function may also need to be checked with frequent blood tests. Store at room temperature away from moisture and heat. What happens if I miss a dose? Skip the missed dose and take your next dose at the usual time to stay on schedule. Do not take extra medicine to make up the missed dose. What happens if I overdose? Seek emergency medical attention or call the Poison Help line at 1-251.169.9875. What should I avoid while taking dofetilide? Grapefruit and grapefruit juice may interact with dofetilide and lead to unwanted side effects. Discuss the use of grapefruit products with your doctor. What are the possible side effects of dofetilide? Get emergency medical help if you have any of these signs of an allergic reaction: hives; difficult breathing; swelling of your face, lips, tongue, or throat. Call your doctor at once if you have:  · headache with chest pain and severe dizziness, fainting, fast or pounding heartbeats;  · loss of appetite, vomiting or severe diarrhea; or  · low magnesium or potassium --confusion, uneven heart rate, increased thirst or urination, sweating, jerking muscle movements, leg discomfort, muscle weakness or limp feeling. Common side effects may include:  · mild headache;  · mild dizziness; or  · cold symptoms such as stuffy nose, sneezing, sore throat. This is not a complete list of side effects and others may occur. Call your doctor for medical advice about side effects. You may report side effects to FDA at 1-458-QVV-5599. What other drugs will affect dofetilide? Other drugs may interact with dofetilide, including prescription and over-the-counter medicines, vitamins, and herbal products. Tell each of your health care providers about all medicines you use now and any medicine you start or stop using. Where can I get more information?   Your doctor or pharmacist can provide more information about dofetilide. Remember, keep this and all other medicines out of the reach of children, never share your medicines with others, and use this medication only for the indication prescribed. Every effort has been made to ensure that the information provided by [address] is accurate, up-to-date, and complete, but no guarantee is made to that effect. Drug information contained herein may be time sensitive. [facility] information has been compiled for use by healthcare practitioners and consumers in the [facility] and therefore [facility] does not warrant that uses outside of the [facility] are appropriate, unless specifically indicated otherwise. [facility]'s drug information does not endorse drugs, diagnose patients or recommend therapy. [facility]'s drug information is an informational resource designed to assist licensed healthcare practitioners in caring for their patients and/or to serve consumers viewing this service as a supplement to, and not a substitute for, the expertise, skill, knowledge and judgment of healthcare practitioners. The absence of a warning for a given drug or drug combination in no way should be construed to indicate that the drug or drug combination is safe, effective or appropriate for any given patient. [facility] does not assume any responsibility for any aspect of healthcare administered with the aid of information [facility] provides. The information contained herein is not intended to cover all possible uses, directions, precautions, warnings, drug interactions, allergic reactions, or adverse effects. If you have questions about the drugs you are taking, check with your doctor, nurse or pharmacist.  Copyright 0021-8912 [address] [place] [address] Avenue: 4.01. Revision date: 4/13/2016. Care instructions adapted under license by [facility] ([facility]).  If you have questions about a medical condition or this instruction, always ask your healthcare professional. [address] any warranty or liability for your use of this information. Patient Education        sotalol (oral/injection)  Pronunciation:  ALEKSANDAR august  Brand:  Betapace, Betapace AF, Sorine, Sotalol Hydrochloride AF, Sotylize  What is the most important information I should know about sotalol? You should not use sotalol if you have asthma, low potassium, or a serious heart condition such as severe heart failure, long QT syndrome, slow heartbeats that have caused you to faint, \"sick sinus syndrome\" or \"AV block\" (unless you have a pacemaker). You will receive your first few doses of sotalol in a hospital setting where your heart can be monitored in case the medicine causes serious side effects. What is sotalol? Sotalol is a beta-blocker that affects the heart and circulation within the atrium and ventricles (the upper and lower chambers of the heart that allow blood to flow into and out of the heart). Sotalol is used to help keep the heart beating normally in people with certain heart rhythm disorders, such as atrial fibrillation, atrial flutter, ventricular tachycardia, and ventricular fibrillation. Sotalol may also be used for purposes not listed in this medication guide. What should I discuss with my healthcare provider before taking sotalol? You should not use sotalol if you are allergic to it, or if you have:  · a serious heart condition such as \"sick sinus syndrome\" or \"AV block\" (unless you have a pacemaker);  · long QT syndrome (in you or a family member);  · severe heart failure;  · slow heartbeats that have caused you to faint;  · asthma or other breathing disorder;  · very low levels of potassium in your blood; or  · (if you take sotalol for atrial fibrillation or atrial flutter) severe kidney disease; Do not give this medicine to a child without medical advice.   Tell your doctor if you have ever had:  · kidney disease (or if you are on dialysis);  · an electrolyte imbalance (such as low levels of potassium or magnesium in your blood);  · congestive heart failure;  · coronary artery disease (hardened arteries);  · breathing problems such as bronchitis or emphysema;  · a thyroid disorder;  · diabetes (using sotalol can make it harder for you to tell when you have low blood sugar);  · a severe allergic reaction. Tell your doctor if you are pregnant. You should not breastfeed while using sotalol. How should I take sotalol? Follow all directions on your prescription label and read all medication guides or instruction sheets. Your doctor may occasionally change your dose. Use the medicine exactly as directed. Sotalol oral is taken by mouth. Sotalol injection is given as an infusion into a vein. A healthcare provider will give you this injection if you are unable to take the medicine by mouth. You will receive your first few doses of sotalol in a hospital setting where your heart can be monitored in case the medicine causes serious side effects. If you already take heart rhythm medication, you may need to stop taking it when you start using sotalol. Carefully follow your doctor's instructions. Measure liquid medicine carefully. Use the dosing syringe provided, or use a medicine dose-measuring device (not a kitchen spoon). Sotalol doses are based on age and body surface area (height and weight) in children. Your child's dose needs may change if the child gains or loses weight, or is still growing. Call your doctor if you are sick with vomiting or diarrhea, or if you have increased thirst, decreased appetite, or are sweating more than usual. You can easily become dehydrated while taking sotalol. This can lead to very low blood pressure, a serious electrolyte imbalance, or kidney failure. You will need frequent medical tests. Your heart function may need to be checked using an electrocardiograph or ECG (sometimes called an EKG).  You may also need heart function tests for 1 to 2 weeks after your last dose. Keep using this medicine as directed, even if you feel well. You may need to take sotalol for the rest of your life. Do not skip doses or stop using sotalol without your doctor's advice. Stopping suddenly may make your condition worse. Follow your doctor's instructions about tapering your dose. If you need surgery, tell your surgeon you currently use this medicine. Store at room temperature away from moisture and heat. Do not allow liquid medicine to freeze. Your pharmacist may prepare an oral suspension (liquid) form of sotalol. Keep the suspension at room temperature and throw away suspension any left over after 3 months of use. What happens if I miss a dose? Skip the missed dose and use your next dose at the regular time. Do not use two doses at one time. Try not to miss any doses. Get your prescription refilled before you run out of medicine completely. What happens if I overdose? Seek emergency medical attention or call the Poison Help line at 1-495.852.9580. An overdose of sotalol can be fatal.  What should I avoid while taking sotalol? Avoid taking an antacid within 2 hours before or 2 hours after you take sotalol. Some antacids can make it harder for your body to absorb sotalol. What are the possible side effects of sotalol? Get emergency medical help if you have signs of an allergic reaction: hives; difficult breathing; swelling of your face, lips, tongue, or throat. Call your doctor at once if you have:  · chest pain;  · fast or pounding heartbeats, fluttering in your chest;  · sudden dizziness (like you might pass out);  · slow heartbeats (especially if you feel light-headed);  · swelling, rapid weight gain; or  · feeling short of breath. Common side effects may include:  · slow heartbeats;  · trouble breathing;  · dizziness; or  · feeling weak or tired. This is not a complete list of side effects and others may occur.  Call your doctor for medical advice about side effects. You may report side effects to FDA at 5-113-FDA-8917. What other drugs will affect sotalol? Tell your doctor about all your other medicines, especially:  · other heart medications;  · blood pressure medication; or  · insulin or oral diabetes medicine. This list is not complete. Other drugs may affect sotalol, including prescription and over-the-counter medicines, vitamins, and herbal products. Not all possible drug interactions are listed here. Where can I get more information? Your doctor or pharmacist can provide more information about sotalol. Remember, keep this and all other medicines out of the reach of children, never share your medicines with others, and use this medication only for the indication prescribed. Every effort has been made to ensure that the information provided by [address] is accurate, up-to-date, and complete, but no guarantee is made to that effect. Drug information contained herein may be time sensitive. [facility] information has been compiled for use by healthcare practitioners and consumers in the [place] and therefore [employer] does not warrant that uses outside of the [place] are appropriate, unless specifically indicated otherwise. [facility]'s drug information does not endorse drugs, diagnose patients or recommend therapy. [facility] drug information is an informational resource designed to assist licensed healthcare practitioners in caring for their patients and/or to serve consumers viewing this service as a supplement to, and not a substitute for, the expertise, skill, knowledge and judgment of healthcare practitioners. The absence of a warning for a given drug or drug combination in no way should be construed to indicate that the drug or drug combination is safe, effective or appropriate for any given patient.  [employer] does not assume any responsibility for any aspect of healthcare administered with the aid of information [employer] provides. The information contained herein is not intended to cover all possible uses, directions, precautions, warnings, drug interactions, allergic reactions, or adverse effects. If you have questions about the drugs you are taking, check with your doctor, nurse or pharmacist.  Copyright 8330-6318 Alvar[address] [place] [address] Avenue: 12.01. Revision date: 6/24/2019. Care instructions adapted under license by [facility]. If you have questions about a medical condition or this instruction, always ask your healthcare professional. [place] [address] any warranty or liability for your use of this information. Patient Education        dronedarone  Pronunciation:  adamaris jurado romi  Brand:  Multaq  What is the most important information I should know about dronedarone? Dronedarone can double your risk of death if you have certain heart conditions. Call your doctor at once if you have an irregular pulse, chest tightness, wheezing, a cough with foamy mucus, swelling or rapid weight gain, or trouble breathing (especially at night). Tell your doctor about all other medicines you start or stop using. Many drugs can interact, and some drugs should not be used together. Dronedarone can also cause liver problems. Call your doctor if you have symptoms such as vomiting, loss of appetite, right-sided upper stomach pain, dark urine, and yellowing of your skin or eyes. What is dronedarone? Dronedarone is a heart rhythm medicine that helps lower your risk of needing to be hospitalized for a heart rhythm disorder called atrial fibrillation. Dronedarone is for adults who have had this disorder in the past, but now have normal heart rhythm. Dronedarone may also be used for purposes not listed in this medication guide. What should I discuss with my healthcare provider before taking dronedarone?   You should not use dronedarone if you are allergic to it, or if you have:  · severe liver disease;  · a serious heart condition such as \"sick sinus syndrome,\" \"AV block\" (unless you have a pacemaker), or very slow heartbeats that have caused you to faint;  · if you are pregnant or breastfeeding; or  · if you used a medicine called amiodarone and then had lung problems or liver problems. Dronedarone can double your risk of death if you have certain heart conditions. You should not use this medicine if:  · you have severe heart failure;  · you were recently hospitalized for worsening heart failure symptoms (shortness of breath, chest tightness, night-time breathing problems, swelling, rapid weight gain); or  · you have a \"permanent\" atrial fibrillation that cannot be changed back to a normal rhythm (this will be determined by your doctor). Many drugs can interact and cause dangerous effects. Some drugs should not be used together with dronedarone. Your doctor may change your treatment plan if you also use:  · other heart rhythm medicines;  · cyclosporine;  · ritonavir or other antiviral medicine;  · an antibiotic such as clarithromycin or telithromycin;  · an antidepressant such as amitriptyline, clomipramine, doxepin, imipramine, nefazodone, and others;  · antifungal medicine such as itraconazole, ketoconazole, or voriconazole; or  · antipsychotic medicine such as chlorpromazine, fluphenazine, promethazine, thioridazine, and others. Tell your doctor if you have other heart problems. Dronedarone may harm an unborn baby. Use effective birth control to prevent pregnancy, and tell your doctor if you become pregnant. You should not breastfeed while using dronedarone. How should I take dronedarone? Follow all directions on your prescription label and read all medication guides or instruction sheets. Use the medicine exactly as directed. Dronedarone is usually taken with morning and evening meals. Your heart function may need to be checked every 3 months to help determine if you still need dronedarone. Do not stop taking dronedarone without your doctor's advice. Check your pulse often, and call your doctor if you notice an irregular rhythm. Store at room temperature away from heat and moisture. What happens if I miss a dose? Skip the missed dose and use your next dose at the regular time. Do not use two doses at one time. What happens if I overdose? Seek emergency medical attention or call the Poison Help line at 1-432.328.1105. What should I avoid while taking dronedarone? Grapefruit may interact with dronedarone and cause side effects. Avoid consuming grapefruit while taking dronedarone. What are the possible side effects of dronedarone? Get emergency medical help if you have signs of an allergic reaction: hives; difficult breathing; swelling of your face, lips, tongue, or throat. Call your doctor at once if you have:  · dry cough, shortness of breath;  · little or no urination;  · fast or pounding heartbeats, fluttering in your chest, shortness of breath, and sudden dizziness (like you might pass out);  · heart problems --swelling, rapid weight gain, feeling short of breath;  · liver problems --loss of appetite, stomach pain (upper right side), tiredness, itching, dark urine, jaundice (yellowing of the skin or eyes); or  · low magnesium or potassium levels --dizziness, numbness or tingling, irregular heartbeats, fluttering in your chest, increased thirst or urination, muscle cramps or limp feeling. Common side effects may include:  · stomach pain, indigestion, nausea, vomiting, diarrhea;  · feeling weak or tired; or  · skin rash, itching, or redness. This is not a complete list of side effects and others may occur. Call your doctor for medical advice about side effects. You may report side effects to FDA at 5-526-AFV-7665. What other drugs will affect dronedarone? Dronedarone can cause a serious heart problem.  Your risk may be higher if you also use certain other medicines for infections, asthma, heart problems, high blood pressure, depression, mental illness, cancer, malaria, or HIV. Tell your doctor about all your current medicines. Many drugs can affect dronedarone, especially:  · digoxin;  · Koko's wort;  · a blood thinner (warfarin, Coumadin, Jantoven);  · a diuretic or \"water pill\";  · medicines to treat tuberculosis;  · medicine to prevent organ transplant rejection;  · seizure medicine; or  · \"statin\" cholesterol medication (Lipitor, Zocor, Vytorin, and others). This list is not complete and many other drugs may affect dronedarone. This includes prescription and over-the-counter medicines, vitamins, and herbal products. Not all possible drug interactions are listed here. Where can I get more information? Your pharmacist can provide more information about dronedarone. Remember, keep this and all other medicines out of the reach of children, never share your medicines with others, and use this medication only for the indication prescribed. Every effort has been made to ensure that the information provided by [address] is accurate, up-to-date, and complete, but no guarantee is made to that effect. Drug information contained herein may be time sensitive. [facility] information has been compiled for use by healthcare practitioners and consumers in the [place] and therefore [employer] does not warrant that uses outside of the [place] are appropriate, unless specifically indicated otherwise. [facility] drug information does not endorse drugs, diagnose patients or recommend therapy. [facility] drug information is an informational resource designed to assist licensed healthcare practitioners in caring for their patients and/or to serve consumers viewing this service as a supplement to, and not a substitute for, the expertise, skill, knowledge and judgment of healthcare practitioners.  The absence of a warning for a given drug or drug combination in no way should be construed to indicate that the drug or drug combination is safe, effective or appropriate for any given patient. [facility] does not assume any responsibility for any aspect of healthcare administered with the aid of information [facility] provides. The information contained herein is not intended to cover all possible uses, directions, precautions, warnings, drug interactions, allergic reactions, or adverse effects. If you have questions about the drugs you are taking, check with your doctor, nurse or pharmacist.  Copyright 6014-7543 [address]: 13.01. Revision date: 4/26/2021. Care instructions adapted under license by [facility] ([facility]). If you have questions about a medical condition or this instruction, always ask your healthcare professional. [place] [address] any warranty or liability for your use of this information. Patient Education        Electrophysiology Study and Catheter Ablation: Before Your Procedure  What is an electrophysiology study and catheter ablation? An electrophysiology study is a test to see if there is a problem with your heart rhythm and to find out how to fix it. It is also called an EP study. A catheter ablation procedure is sometimes done at the same time. This procedure destroys (ablates) small areas of your heart that are causing your heart rhythm problem. The doctor puts plastic tubes called catheters into blood vessels in your groin, arm, or neck. The doctor then uses an X-ray machine to guide long wires through the tubes to your heart. The doctor can use these wires to record your heart's electrical signals. If a problem with your heart can be fixed with ablation, the doctor can use the wires to destroy a small part of your heart tissue. This is most often done with radio waves. You may get medicine that relaxes you or puts you in a light sleep. Or you might be asleep during the procedure. The places where the catheters go in will be numb.   An EP study and ablation can take 2 to 6 hours. In rare cases, it can take longer. If you have an EP study only and you don't need more treatment, you may go home the same day. But if you also have ablation, you may stay overnight in the hospital.  How do you prepare for the procedure? Procedures can be stressful. This information will help you understand what you can expect. And it will help you safely prepare for your procedure. Preparing for the procedure    · Be sure you have someone to take you home. Anesthesia and pain medicine will make it unsafe for you to drive or get home on your own.     · Understand exactly what procedure is planned, along with the risks, benefits, and other options.     · Tell your doctor ALL the medicines, vitamins, supplements, and herbal remedies you take. Some may increase the risk of problems during your procedure. Your doctor will tell you if you should stop taking any of them before the procedure and how soon to do it.     · If you take aspirin or some other blood thinner, ask your doctor if you should stop taking it before your procedure. Make sure that you understand exactly what your doctor wants you to do. These medicines increase the risk of bleeding.     · Make sure your doctor and the hospital have a copy of your advance directive. If you don't have one, you may want to prepare one. It lets others know your health care wishes. It's a good thing to have before any type of surgery or procedure. What happens on the day of the procedure? · Follow the instructions exactly about when to stop eating and drinking. If you don't, your procedure may be canceled. If your doctor told you to take your medicines on the day of the procedure, take them with only a sip of water.     · Take a bath or shower before you come in for your procedure. Do not apply lotions, perfumes, deodorants, or nail polish.     · Take off all jewelry and piercings. And take out contact lenses, if you wear them.    At the [employer]. Care instructions adapted under license by [facility] [address] If you have questions about a medical condition or this instruction, always ask your healthcare professional. [place] [address] any warranty or liability for your use of this information. Patient Education        Electrophysiology Study and Catheter Ablation: What to Expect at [address] [place] [address] Drive had an electrophysiology study for a problem with your heartbeat. You may also have had a catheter ablation to try to correct the problem. You may have swelling, bruising, or a small lump around the sites where the catheters went into your body. These should go away in 3 to 4 weeks. You can do light activities at home. Don't do anything strenuous until your doctor says it is okay. This may be for several days. This care sheet gives you a general idea about how long it will take for you to recover. But each person recovers at a different pace. Follow the steps below to get better as quickly as possible. How can you care for yourself at home? Activity    · If the doctor gave you a sedative:  ? For 24 hours, don't do anything that requires attention to detail, such as going to work, making important decisions, or signing any legal documents. It takes time for the medicine's effects to completely wear off.  ? For your safety, do not drive or operate any machinery that could be dangerous. Wait until the medicine wears off and you can think clearly and react easily.     · Do not do strenuous exercise and do not lift, pull, or push anything heavy until your doctor says it is okay. This may be for several days.     · Ask your doctor when it is okay to have sex. Diet    · You can eat your normal diet. If your stomach is upset, try bland, low-fat foods like plain rice, broiled chicken, toast, and yogurt.     · Drink plenty of fluids (unless your doctor tells you not to).    Medicines    · Your doctor will tell you if and when you can restart your medicines. You will also get instructions about taking any new medicines.     · If you take aspirin or some other blood thinner, ask your doctor if and when to start taking it again. Make sure that you understand exactly what your doctor wants you to do.     · Be safe with medicines. Take your medicines exactly as prescribed. Call your doctor if you think you are having a problem with your medicine. Catheter site care    · For 1 or 2 days, keep a bandage over the spot where the catheter was inserted. The bandage probably will fall off in this time.     · Put ice or a cold pack on the area for 10 to 20 minutes at a time to help with soreness or swelling. Put a thin cloth between the ice and your skin.     · You may shower 24 to 48 hours after the procedure, if your doctor okays it. Pat the incision dry.     · Do not soak the catheter site until it is healed. Don't take a bath for 1 week, or until your doctor tells you it is okay.     · Watch for bleeding from the site. A small amount of blood (up to the size of a quarter) on the bandage can be normal.     · If you are bleeding, lie down and press on the area for 15 minutes to try to make it stop. If the bleeding does not stop, call your doctor or seek immediate medical care. Follow-up care is a key part of your treatment and safety. Be sure to make and go to all appointments, and call your doctor if you are having problems. It's also a good idea to know your test results and keep a list of the medicines you take. When should you call for help? Call 911  anytime you think you may need emergency care. For example, call if:    · You passed out (lost consciousness).     · You have symptoms of a heart attack. These may include:  ? Chest pain or pressure, or a strange feeling in the chest.  ? Sweating. ? Shortness of breath. ? Nausea or vomiting.   ? Pain, pressure, or a strange feeling in the back, neck, jaw, or upper belly, or in one or both shoulders or arms. ? Lightheadedness or sudden weakness. ? A fast or irregular heartbeat. After you call 911, the  may tell you to chew 1 adult-strength or 2 to 4 low-dose aspirin. Wait for an ambulance. Do not try to drive yourself.     · You have symptoms of a stroke. These may include:  ? Sudden numbness, tingling, weakness, or loss of movement in your face, arm, or leg, especially on only one side of your body. ? Sudden vision changes. ? Sudden trouble speaking. ? Sudden confusion or trouble understanding simple statements. ? Sudden problems with walking or balance. ? A sudden, severe headache that is different from past headaches. Call your doctor now or seek immediate medical care if:    · You are bleeding from the area where the catheter was put in your blood vessel.     · You have a fast-growing, painful lump at the catheter site.     · You have signs of infection, such as:  ? Increased pain, swelling, warmth, or redness. ? Red streaks leading from the catheter site. ? Pus draining from the catheter site. ? A fever.     · Your leg, arm, or hand is painful, looks blue, or feels cold, numb, or tingly. Watch closely for any changes in your health, and be sure to contact your doctor if you have any problems. Where can you learn more? Go to https://[employer] Sciencespedaliaeb.Huoshi. org and sign in to your [facility] account. Enter [phone] in the [facility] box to learn more about \"Electrophysiology Study and Catheter Ablation: What to Expect at Home. \"     If you do not have an account, please click on the \"Sign Up Now\" link. Current as of: April 29, 2021               Content Version: 13.1  © 0949-1690 Healthwise, [employer]. Care instructions adapted under license by [facility] ([facility]).  If you have questions about a medical condition or this instruction, always ask your healthcare professional. Shiraz [place]os any warranty or liability for your use of this information. Patient Education        Learning About Catheter Ablation for Heart Rhythm Problems  What is catheter ablation? Catheter ablation is a procedure that treats heart rhythm problems. These problems include atrial fibrillation, supraventricular tachycardia (SVT), atrial flutter, and ventricular tachycardia. Your heart should have a strong, steady beat. That beat is controlled by the heart's electrical system. Sometimes that system misfires. This causes a heartbeat that is too fast and isn't steady. Catheter ablation is a way to get into your heart and fix the problem. Ablation is not surgery. How is catheter ablation done? Your doctor inserts thin tubes called catheters into a blood vessel in your groin, arm, or neck. Then your doctor feeds them into the heart. Wires in the catheters help the doctor find the problem areas. Then the doctor uses the wires to send energy to destroy the tiny areas of heart tissue that are causing the problems. It may seem like a bad idea to destroy parts of your heart on purpose. But the areas that are destroyed are very tiny. They should not affect your heart's ability to do its job. You may be awake during the procedure. Or you may be asleep. The doctor will give you medicines to help you feel relaxed and to numb the areas where the catheters go in. You may feel a little uncomfortable, but you should not feel pain. What can you expect after catheter ablation? You may stay overnight in the hospital. How long you stay in the hospital depends on the type of ablation you have. Do not exercise hard or lift anything heavy for a week. You will probably be able to go back to work and to your normal routine in 1 or 2 days. You may have swelling, bruising, or a small lump around the site where the catheters went into your body. These should go away in 3 to 4 weeks. You may have to take some medicines for a while.   Follow-up care is a key part of your treatment and safety. Be sure to make and go to all appointments, and call your doctor if you are having problems. It's also a good idea to know your test results and keep a list of the medicines you take. Where can you learn more? Go to https://chdarwin.Snackr. org and sign in to your [employer] account. Enter X269 in the [employer] box to learn more about \"Learning About Catheter Ablation for Heart Rhythm Problems. \"     If you do not have an account, please click on the \"Sign Up Now\" link. Current as of: April 29, 2021               Content Version: 13.1  © 6345-9242 Healthwise, [employer]. Care instructions adapted under license by [address] If you have questions about a medical condition or this instruction, always ask your healthcare professional. [address] any warranty or liability for your use of this information.

## 2022-03-01 NOTE — PROGRESS NOTES
[address]   Electrophysiology Consult Note            Date: 3/1/22  Patient Name: Cesar Peterson  YOB: 1940    Primary Care Physician: Oly Cedeño MD    CHIEF COMPLAINT:   Chief Complaint   Patient presents with    New Patient    Fatigue    Atrial Fibrillation       HISTORY OF PRESENT ILLNESS: Cesar Peterson is a [de-identified] y.o. female with a PMH significant for AF, CHF, ICM, pulmonary HTN. Patient had echo in 2005 that demonstrated an EF of 40-45%. She underwent a cardiac cath at that time that demonstrated significant coronary disease. Her EF recovered in 2014 to 60-65%. Patient was diagnosed with AF in 10/2021. She was admitted in 11/2021 with UTI and sepsis and was found to be in AF RVR at this time. Her echo on 11/3/2021 demonstrated 35-40%. Patient underwent DCCV on 1/17/2022. Patient was recently in [place] and went in to AF and required a CV there. Today, 3/1/2022, ECG demonstrates AF (86). She states that she is doing well. She is fatigued when she is in AF. She feels like her energy levels are lower than they used to be. She is taking her medications as prescribed. Denies recent issues with bleeding or bruising. Patient denies current edema, chest pain, sob, palpitations, dizziness or syncope. Past Medical History:   has a past medical history of COVID-19, Hypertension, and Thyroid disease. Past Surgical History:   has a past surgical history that includes Hemorrhoid surgery; Hysterectomy; Tonsillectomy; and Knee arthroscopy (Right, 3/10/2016). Allergies:  Shellfish-derived products    Social History:   reports that she quit smoking about 42 years ago. She has never used smokeless tobacco. She reports current alcohol use of about 12.0 - 14.0 standard drinks of alcohol per week. She reports that she does not use drugs. Family History: family history is not on file.     Home Medications:    Prior to Admission medications    Medication Sig Start Date End Date Taking? Authorizing Provider   DIGOXIN PO Take by mouth   Yes Historical Provider, MD   metoprolol succinate (TOPROL XL) 50 MG extended release tablet Take 1.5 tablets by mouth 2 times daily 2/16/22  Yes Oli Garcia DO   losartan (COZAAR) 50 MG tablet Take 50 mg by mouth daily   Yes Historical Provider, MD   apixaban (ELIQUIS) 5 MG TABS tablet Take 1 tablet by mouth 2 times daily 12/18/21  Yes Oli Garcia DO   aspirin 81 MG chewable tablet Take 1 tablet by mouth daily 12/18/21 4/17/22 Yes Oli Garcia DO   furosemide (LASIX) 20 MG tablet TAKE 1 TABLET BY MOUTH DAILY 12/13/21 3/1/22 Yes Oli Garcia DO   levothyroxine (SYNTHROID) 112 MCG tablet Take 112 mcg by mouth Daily   Yes Historical Provider, MD   escitalopram (LEXAPRO) 10 MG tablet Take 10 mg by mouth daily    Yes Historical Provider, MD   nitrofurantoin, macrocrystal-monohydrate, (MACROBID) 100 MG capsule Take 100 mg by mouth 2 times daily  Patient not taking: Reported on 3/1/2022    Historical Provider, MD   metoprolol succinate (TOPROL XL) 25 MG extended release tablet Take 1 tablet by mouth 2 times daily  Patient not taking: Reported on 2/16/2022 11/16/21   Oli Garcia DO   budesonide (ENTOCORT EC) 3 MG extended release capsule Take 3 mg by mouth every morning   Patient not taking: Reported on 2/16/2022    Historical Provider, MD       REVIEW OF SYSTEMS:    All 14-point review of systems are completed and  pertinent positives are mentioned in the history of present illness. Other  systems are reviewed and are negative. Physical Examination:    /80   Pulse 91   Ht 5' 7\" (1.702 m)   Wt 143 lb (64.9 kg)   SpO2 98%   BMI 22.40 kg/m²      Constitutional and General Appearance:    alert, cooperative, no distress and appears stated age  [de-identified]:    PERRLA, no cervical lymphadenopathy. No masses palpable.  Normal oral  mucosa  Respiratory:  · Normal excursion and expansion without use of accessory muscles  · Resp Auscultation: Normal breath sounds without dullness or wheezing  Cardiovascular:  · The apical impulse is not displaced  · Irregular rate and rhythm wo M/G/R  Abdomen:  · No masses or tenderness  · Bowel sounds present  Extremities:  ·  No Cyanosis or Clubbing  ·  Lower extremity edema: No  · Skin: Warm and dry  Neurological:  · Alert and oriented. · Moves all extremities well  · No abnormalities of mood, affect, memory, mentation, or behavior are noted    DATA:    ECG 3/1/22: Personally reviewed. ROBERTO 1/17/2022   Summary   ROBERTO performed by Dr. Nomi Ritter. Left ventricular ejection fraction is visually estimated at 45%. Right ventricular systolic function is reduced. Biatrial dilatation. There is no evidence of mass or thrombus in the left atrium or appendage. A bubble study was performed and shows no evidence of a right-to-left atrial   level shunt. There is mild posterior mitral annular calcification and restricted motion   of the posterior mitral leaflet which fails to fully coapt. Moderate mitral regurgitation with multiple jets noted. Moderate tricuspid regurgitation. Cardiac cath 10/30/2021  Impression:  1. Non-obstructive coronary artery disease. 2. Non-ischemic cardiomyopathy. 3. Acute biventricular systolic heart failure with mildly elevated left and right-sided cardiac filling pressures. 4. Mild pulmonary hypertension. 5. Preserved Danish cardiac output and index. 6. Persistent atrial fibrillation. IMPRESSION:    1. Persistent symptomatic atrial fibrillation ([id number] score 3)  03/01/2022  Patient is a pleasant 61-year-old female with a medical history significant for hypothyroidism and depression who presents from home to [facility]. Patient has had paroxysmal atrial fibrillation and has been symptomatic with palpitations, fatigue, shortness of breath. She is undergone 2 cardioversions and now presents in atrial fibrillation.   We discussed anticoagulation (NOAC and warfarin), rate control, rhythm control, AVN + pacemaker, and atrial fibrillation ablation. We reviewed risks and benefits of each approach. We discussed side effects of class IC, class II, class III, and class IV antiarrhythmics. All questions were answered. Patient would like to start with dofetilide and so we will schedule an induction. All questions concerns addressed. - Schedule dofetilide admission.  - Continue apixaban 5 mg BID.  - Continue metoprolol 50 mg daily. Of note, I follow her , Hank Seo. RECOMMENDATIONS:  1. Discussed at length treatment options for AF:   1. Medications to slow heart rate (goal of less than 90 at rest, less than 110 with mild activity)   2. Medications for rhythm control (amiodarone, dronedarone, and propafenone or dofetilide which requires hospital admission for four days). Discussed risks and benefits. 3. Ablation to burn the abnormal electrical activity within the heart. Discussed risks and benefits. 4. Pace and Ablate to put pacemaker in the heart and burn the electrical activity in the heart. Would be dependent upon pacemaker for the rest of your life. This would be a last resort. 5. Cardioversion to electrically shock heart back into normal rhythm. 2.  Continue Eliquis to prevent stroke in the presence of AF. 3.  Plan for Tikosyn admission (4 day hospital stay). 4.  Follow up after hospital stay. QUALITY MEASURES  1. Tobacco Cessation Counseling: NA  2. Retake of BP if >140/90:   NA  3. Documentation to PCP/referring for new patient:  Sent to PCP at close of office visit  4. CAD patient on anti-platelet: NA  5. CAD patient on STATIN therapy:  NA  6. Patient with CHF and aFib on anticoagulation:  Yes     All questions and concerns were addressed to the patient/family. Alternatives to my treatment were discussed. Dr. Kennedy Pichardo MD  Electrophysiology  [facility]. [address]. [address].   Jhon Muñoz  Phone: [phone]  Fax: [phone] NOTE: This report was transcribed using voice recognition software. Every effort was made to ensure accuracy, however, inadvertent computerized transcription errors may be present. Oriana Segura RN, am scribing for and in the presence of Dr. Rudy Rodriguez. 03/01/22 1:16 PM   Adriana Mckeon RN    The scribe's documentation has been prepared under my direction and personally reviewed by me in its entirety. I confirm that the note above accurately reflects all work, physical examination, the discussion of treatments and procedures, and medical decision making performed by me. Josh Rivas MD personally performed the services described in this documentation as scribed by nurse in my presence, and is both accurate and complete.     Electronically signed by Darian Syed MD on 3/1/2022 at 5:17 PM

## 2022-03-03 ENCOUNTER — HOSPITAL ENCOUNTER (OUTPATIENT)
Dept: WOMENS IMAGING | Age: 82
Discharge: HOME | End: 2022-03-03
Payer: COMMERCIAL

## 2022-03-03 VITALS — WEIGHT: 143 LBS | HEIGHT: 67 IN | BODY MASS INDEX: 22.44 KG/M2

## 2022-03-03 DIAGNOSIS — Z12.31 VISIT FOR SCREENING MAMMOGRAM: ICD-10-CM

## 2022-03-03 PROCEDURE — 77067 SCR MAMMO BI INCL CAD: CPT

## 2022-03-14 ENCOUNTER — TELEPHONE (OUTPATIENT)
Dept: CARDIOLOGY CLINIC | Age: 82
End: 2022-03-14

## 2022-03-14 ENCOUNTER — HOSPITAL ENCOUNTER (INPATIENT)
Age: 82
LOS: 2 days | Discharge: HOME | DRG: 309 | End: 2022-03-16
Attending: INTERNAL MEDICINE | Admitting: INTERNAL MEDICINE
Payer: MEDICARE

## 2022-03-14 PROBLEM — I48.91 ATRIAL FIBRILLATION (HCC): Status: ACTIVE | Noted: 2022-03-14

## 2022-03-14 LAB
ANION GAP SERPL CALCULATED.3IONS-SCNC: 12 MMOL/L (ref 3–16)
BUN BLDV-MCNC: 23 MG/DL (ref 7–20)
CALCIUM SERPL-MCNC: 9.3 MG/DL (ref 8.3–10.6)
CHLORIDE BLD-SCNC: 105 MMOL/L (ref 99–110)
CO2: 22 MMOL/L (ref 21–32)
CREAT SERPL-MCNC: 1 MG/DL (ref 0.6–1.2)
EKG ATRIAL RATE: 101 BPM
EKG ATRIAL RATE: 357 BPM
EKG DIAGNOSIS: NORMAL
EKG DIAGNOSIS: NORMAL
EKG Q-T INTERVAL: 374 MS
EKG Q-T INTERVAL: 398 MS
EKG QRS DURATION: 84 MS
EKG QRS DURATION: 86 MS
EKG QTC CALCULATION (BAZETT): 392 MS
EKG QTC CALCULATION (BAZETT): 456 MS
EKG R AXIS: 60 DEGREES
EKG R AXIS: 64 DEGREES
EKG T AXIS: -35 DEGREES
EKG T AXIS: -37 DEGREES
EKG VENTRICULAR RATE: 66 BPM
EKG VENTRICULAR RATE: 79 BPM
GFR AFRICAN AMERICAN: >60
GFR NON-AFRICAN AMERICAN: 53
GLUCOSE BLD-MCNC: 98 MG/DL (ref 70–99)
MAGNESIUM: 1.8 MG/DL (ref 1.8–2.4)
POTASSIUM SERPL-SCNC: 4.3 MMOL/L (ref 3.5–5.1)
SODIUM BLD-SCNC: 139 MMOL/L (ref 136–145)

## 2022-03-14 PROCEDURE — 6370000000 HC RX 637 (ALT 250 FOR IP): Performed by: NURSE PRACTITIONER

## 2022-03-14 PROCEDURE — 93010 ELECTROCARDIOGRAM REPORT: CPT | Performed by: INTERNAL MEDICINE

## 2022-03-14 PROCEDURE — 99223 1ST HOSP IP/OBS HIGH 75: CPT | Performed by: INTERNAL MEDICINE

## 2022-03-14 PROCEDURE — 2580000003 HC RX 258

## 2022-03-14 PROCEDURE — 93005 ELECTROCARDIOGRAM TRACING: CPT

## 2022-03-14 PROCEDURE — 2060000000 HC ICU INTERMEDIATE R&B

## 2022-03-14 PROCEDURE — 6360000002 HC RX W HCPCS: Performed by: NURSE PRACTITIONER

## 2022-03-14 PROCEDURE — 93005 ELECTROCARDIOGRAM TRACING: CPT | Performed by: INTERNAL MEDICINE

## 2022-03-14 PROCEDURE — 36415 COLL VENOUS BLD VENIPUNCTURE: CPT

## 2022-03-14 PROCEDURE — 6370000000 HC RX 637 (ALT 250 FOR IP)

## 2022-03-14 PROCEDURE — 80048 BASIC METABOLIC PNL TOTAL CA: CPT

## 2022-03-14 PROCEDURE — 83735 ASSAY OF MAGNESIUM: CPT

## 2022-03-14 RX ORDER — ACETAMINOPHEN 650 MG/1
650 SUPPOSITORY RECTAL EVERY 6 HOURS PRN
Status: DISCONTINUED | OUTPATIENT
Start: 2022-03-14 | End: 2022-03-16 | Stop reason: HOSPADM

## 2022-03-14 RX ORDER — ESCITALOPRAM OXALATE 10 MG/1
10 TABLET ORAL DAILY
Status: DISCONTINUED | OUTPATIENT
Start: 2022-03-14 | End: 2022-03-16 | Stop reason: HOSPADM

## 2022-03-14 RX ORDER — LEVOTHYROXINE SODIUM 112 UG/1
112 TABLET ORAL DAILY
Status: DISCONTINUED | OUTPATIENT
Start: 2022-03-14 | End: 2022-03-16 | Stop reason: HOSPADM

## 2022-03-14 RX ORDER — ASPIRIN 81 MG/1
81 TABLET, CHEWABLE ORAL DAILY
Status: DISCONTINUED | OUTPATIENT
Start: 2022-03-14 | End: 2022-03-16 | Stop reason: HOSPADM

## 2022-03-14 RX ORDER — SODIUM CHLORIDE 9 MG/ML
25 INJECTION, SOLUTION INTRAVENOUS PRN
Status: DISCONTINUED | OUTPATIENT
Start: 2022-03-14 | End: 2022-03-16 | Stop reason: HOSPADM

## 2022-03-14 RX ORDER — SODIUM CHLORIDE 0.9 % (FLUSH) 0.9 %
5-40 SYRINGE (ML) INJECTION PRN
Status: DISCONTINUED | OUTPATIENT
Start: 2022-03-14 | End: 2022-03-16 | Stop reason: HOSPADM

## 2022-03-14 RX ORDER — ACETAMINOPHEN 325 MG/1
650 TABLET ORAL EVERY 6 HOURS PRN
Status: DISCONTINUED | OUTPATIENT
Start: 2022-03-14 | End: 2022-03-16 | Stop reason: HOSPADM

## 2022-03-14 RX ORDER — POLYETHYLENE GLYCOL 3350 17 G/17G
17 POWDER, FOR SOLUTION ORAL DAILY PRN
Status: DISCONTINUED | OUTPATIENT
Start: 2022-03-14 | End: 2022-03-16 | Stop reason: HOSPADM

## 2022-03-14 RX ORDER — MAGNESIUM SULFATE 1 G/100ML
1000 INJECTION INTRAVENOUS ONCE
Status: COMPLETED | OUTPATIENT
Start: 2022-03-14 | End: 2022-03-14

## 2022-03-14 RX ORDER — LOSARTAN POTASSIUM 25 MG/1
50 TABLET ORAL DAILY
Status: DISCONTINUED | OUTPATIENT
Start: 2022-03-14 | End: 2022-03-16 | Stop reason: HOSPADM

## 2022-03-14 RX ORDER — DOFETILIDE 0.25 MG/1
250 CAPSULE ORAL EVERY 12 HOURS SCHEDULED
Status: DISCONTINUED | OUTPATIENT
Start: 2022-03-14 | End: 2022-03-16 | Stop reason: HOSPADM

## 2022-03-14 RX ORDER — FUROSEMIDE 20 MG/1
20 TABLET ORAL DAILY
Status: DISCONTINUED | OUTPATIENT
Start: 2022-03-14 | End: 2022-03-16 | Stop reason: HOSPADM

## 2022-03-14 RX ORDER — SODIUM CHLORIDE 0.9 % (FLUSH) 0.9 %
5-40 SYRINGE (ML) INJECTION EVERY 12 HOURS SCHEDULED
Status: DISCONTINUED | OUTPATIENT
Start: 2022-03-14 | End: 2022-03-16 | Stop reason: HOSPADM

## 2022-03-14 RX ADMIN — DOFETILIDE 250 MCG: 0.25 CAPSULE ORAL at 11:56

## 2022-03-14 RX ADMIN — MAGNESIUM SULFATE HEPTAHYDRATE 1000 MG: 1 INJECTION, SOLUTION INTRAVENOUS at 12:05

## 2022-03-14 RX ADMIN — SODIUM CHLORIDE, PRESERVATIVE FREE 10 ML: 5 INJECTION INTRAVENOUS at 21:14

## 2022-03-14 RX ADMIN — SODIUM CHLORIDE, PRESERVATIVE FREE 10 ML: 5 INJECTION INTRAVENOUS at 10:33

## 2022-03-14 RX ADMIN — DOFETILIDE 250 MCG: 0.25 CAPSULE ORAL at 21:14

## 2022-03-14 RX ADMIN — APIXABAN 5 MG: 5 TABLET, FILM COATED ORAL at 21:14

## 2022-03-14 RX ADMIN — METOPROLOL SUCCINATE 75 MG: 50 TABLET, EXTENDED RELEASE ORAL at 21:13

## 2022-03-14 NOTE — PROGRESS NOTES
Patient admitted to room 443  from home. Patient oriented to room, call light, bed rails, phone, lights and bathroom. Patient instructed about the schedule of the day including: vital sign frequency, lab draws, possible tests, frequency of MD and staff rounds, including RN/MD rounding together at bedside, daily weights, and I &O's. Patient instructed about prescribed diet, how to use 8MENU, and television. No bed alarm in place, patient aware of placement and reason. Telemetry box 103  in place, patient aware of placement and reason. Bed locked, in lowest position, side rails up 2/4, call light within reach. Will continue to monitor.

## 2022-03-15 LAB
ANION GAP SERPL CALCULATED.3IONS-SCNC: 10 MMOL/L (ref 3–16)
BUN BLDV-MCNC: 21 MG/DL (ref 7–20)
CALCIUM SERPL-MCNC: 9.1 MG/DL (ref 8.3–10.6)
CHLORIDE BLD-SCNC: 107 MMOL/L (ref 99–110)
CO2: 25 MMOL/L (ref 21–32)
CREAT SERPL-MCNC: 0.9 MG/DL (ref 0.6–1.2)
EKG ATRIAL RATE: 326 BPM
EKG ATRIAL RATE: 357 BPM
EKG DIAGNOSIS: NORMAL
EKG DIAGNOSIS: NORMAL
EKG Q-T INTERVAL: 416 MS
EKG Q-T INTERVAL: 430 MS
EKG QRS DURATION: 84 MS
EKG QRS DURATION: 86 MS
EKG QTC CALCULATION (BAZETT): 432 MS
EKG QTC CALCULATION (BAZETT): 464 MS
EKG R AXIS: 59 DEGREES
EKG R AXIS: 62 DEGREES
EKG T AXIS: -16 DEGREES
EKG T AXIS: -21 DEGREES
EKG VENTRICULAR RATE: 61 BPM
EKG VENTRICULAR RATE: 75 BPM
GFR AFRICAN AMERICAN: >60
GFR NON-AFRICAN AMERICAN: >60
GLUCOSE BLD-MCNC: 107 MG/DL (ref 70–99)
MAGNESIUM: 1.8 MG/DL (ref 1.8–2.4)
POTASSIUM SERPL-SCNC: 4.3 MMOL/L (ref 3.5–5.1)
SODIUM BLD-SCNC: 142 MMOL/L (ref 136–145)

## 2022-03-15 PROCEDURE — 6360000002 HC RX W HCPCS: Performed by: NURSE PRACTITIONER

## 2022-03-15 PROCEDURE — 2580000003 HC RX 258

## 2022-03-15 PROCEDURE — 6370000000 HC RX 637 (ALT 250 FOR IP)

## 2022-03-15 PROCEDURE — 99233 SBSQ HOSP IP/OBS HIGH 50: CPT | Performed by: NURSE PRACTITIONER

## 2022-03-15 PROCEDURE — 93010 ELECTROCARDIOGRAM REPORT: CPT | Performed by: INTERNAL MEDICINE

## 2022-03-15 PROCEDURE — 80048 BASIC METABOLIC PNL TOTAL CA: CPT

## 2022-03-15 PROCEDURE — 36415 COLL VENOUS BLD VENIPUNCTURE: CPT

## 2022-03-15 PROCEDURE — 83735 ASSAY OF MAGNESIUM: CPT

## 2022-03-15 PROCEDURE — 93005 ELECTROCARDIOGRAM TRACING: CPT | Performed by: INTERNAL MEDICINE

## 2022-03-15 PROCEDURE — 6370000000 HC RX 637 (ALT 250 FOR IP): Performed by: NURSE PRACTITIONER

## 2022-03-15 PROCEDURE — 1200000000 HC SEMI PRIVATE

## 2022-03-15 RX ORDER — MAGNESIUM SULFATE 1 G/100ML
1000 INJECTION INTRAVENOUS ONCE
Status: COMPLETED | OUTPATIENT
Start: 2022-03-15 | End: 2022-03-15

## 2022-03-15 RX ADMIN — APIXABAN 5 MG: 5 TABLET, FILM COATED ORAL at 09:01

## 2022-03-15 RX ADMIN — FUROSEMIDE 20 MG: 20 TABLET ORAL at 09:01

## 2022-03-15 RX ADMIN — METOPROLOL SUCCINATE 75 MG: 50 TABLET, EXTENDED RELEASE ORAL at 21:01

## 2022-03-15 RX ADMIN — DOFETILIDE 250 MCG: 0.25 CAPSULE ORAL at 21:01

## 2022-03-15 RX ADMIN — ESCITALOPRAM OXALATE 10 MG: 10 TABLET ORAL at 09:01

## 2022-03-15 RX ADMIN — DOFETILIDE 250 MCG: 0.25 CAPSULE ORAL at 09:01

## 2022-03-15 RX ADMIN — METOPROLOL SUCCINATE 75 MG: 50 TABLET, EXTENDED RELEASE ORAL at 09:01

## 2022-03-15 RX ADMIN — LOSARTAN POTASSIUM 50 MG: 25 TABLET, FILM COATED ORAL at 09:01

## 2022-03-15 RX ADMIN — APIXABAN 5 MG: 5 TABLET, FILM COATED ORAL at 21:01

## 2022-03-15 RX ADMIN — LEVOTHYROXINE SODIUM 112 MCG: 0.11 TABLET ORAL at 05:58

## 2022-03-15 RX ADMIN — SODIUM CHLORIDE, PRESERVATIVE FREE 10 ML: 5 INJECTION INTRAVENOUS at 12:46

## 2022-03-15 RX ADMIN — ASPIRIN 81 MG 81 MG: 81 TABLET ORAL at 09:01

## 2022-03-15 RX ADMIN — SODIUM CHLORIDE, PRESERVATIVE FREE 10 ML: 5 INJECTION INTRAVENOUS at 21:01

## 2022-03-15 RX ADMIN — MAGNESIUM SULFATE HEPTAHYDRATE 1000 MG: 1 INJECTION, SOLUTION INTRAVENOUS at 08:59

## 2022-03-15 ASSESSMENT — PAIN SCALES - GENERAL: PAINLEVEL_OUTOF10: 0

## 2022-03-15 NOTE — FLOWSHEET NOTE
3. 15/Patient Naeem Camacho and her daughter were very talkative. 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 She said she was Restoration and spiritual . Naeem Camacho talked about she brought a lot of things to do with her like write thank you cards, play games, listen to music or watch tv.     03/15/22 1602   Encounter Summary   Services provided to: Patient and family together   Referral/Consult From: [address] [place] [address] Drive; Children   Continue Visiting   (JE/3.15/Spiritual support with conversation and prayer)   Complexity of Encounter Low   Length of Encounter 45 minutes   Spiritual Assessment Completed Yes   Routine   Type Initial   Assessment Calm; Approachable   Intervention Active listening;Nurtured hope;Prayer;Sustaining presence/ Ministry of presence   Outcome Expressed gratitude;Engaged in conversation; Shared life review

## 2022-03-15 NOTE — FLOWSHEET NOTE
03/15/22 0834   Vital Signs   Temp 97.8 °F (36.6 °C)   Temp Source Oral   Pulse 102   Heart Rate Source Monitor   Resp 16   BP (!) 142/88   BP Location Right upper arm   MAP (mmHg) 106   Patient Position Sitting   Level of Consciousness Alert (0)   MEWS Score 2   Patient Currently in Pain Denies   Oxygen Therapy   SpO2 94 %   Pulse Oximeter Device Mode Intermittent   Pulse Oximeter Device Location Finger   O2 Device None (Room air)   Patient Observation   Observations Resting in bed   Patient A?o VSS no c/o pain no distress noted at this time.  All needs addressed

## 2022-03-15 NOTE — PROGRESS NOTES
[address]     Electrophysiology                                     Progress Note    Admission date:  3/14/2022    Reason for follow up visit: AF    HPI/CC: Priscilla Marie was admitted on 3/14/2022 for Tikosyn induction. Rhythm has been AF. Subjective: Denies chest pain, palpitations, shortness of breath, and dizziness. Vitals:  Blood pressure (!) 142/88, pulse 102, temperature 97.8 °F (36.6 °C), temperature source Oral, resp. rate 16, height 5' 7\" (1.702 m), weight 143 lb 8 oz (65.1 kg), SpO2 94 %.   Temp  Av.8 °F (36.6 °C)  Min: 97.6 °F (36.4 °C)  Max: 98.1 °F (36.7 °C)  Pulse  Av.3  Min: 68  Max: 102  BP  Min: 128/67  Max: 145/77  SpO2  Av.4 %  Min: 94 %  Max: 98 %    24 hour I/O  No intake or output data in the 24 hours ending 03/15/22 0856  Current Facility-Administered Medications   Medication Dose Route Frequency Provider Last Rate Last Admin    magnesium sulfate 1000 mg in dextrose 5% 100 mL IVPB  1,000 mg IntraVENous Once Renu Gonzalez, APRN - CNP        apixaban (ELIQUIS) tablet 5 mg  5 mg Oral BID , APRN - CNP   5 mg at 22    aspirin chewable tablet 81 mg  81 mg Oral Daily Daijajeet Thapa, APRN - CNP        escitalopram (LEXAPRO) tablet 10 mg  10 mg Oral Daily , APRN - CNP        furosemide (LASIX) tablet 20 mg  20 mg Oral Daily , APRN - CNP        levothyroxine (SYNTHROID) tablet 112 mcg  112 mcg Oral Daily , APRN - CNP   112 mcg at 03/15/22 0558    losartan (COZAAR) tablet 50 mg  50 mg Oral Daily Ronald, APRN - CNP        metoprolol succinate (TOPROL XL) extended release tablet 75 mg  75 mg Oral BID Ronald, APRN - CNP   75 mg at 22    sodium chloride flush 0.9 % injection 5-40 mL  5-40 mL IntraVENous 2 times per day , APRN - CNP   10 mL at 22    sodium chloride flush 0.9 % injection 5-40 mL  5-40 mL IntraVENous PRN Ronald Luis APRN - CNP  0.9 % sodium chloride infusion  25 mL IntraVENous PRN Carissa Osier, APRN - CNP        acetaminophen (TYLENOL) tablet 650 mg  650 mg Oral Q6H PRN Carissa Osier, APRN - CNP        Or    acetaminophen (TYLENOL) suppository 650 mg  650 mg Rectal Q6H PRN Carissa Osier, APRN - CNP        polyethylene glycol (GLYCOLAX) packet 17 g  17 g Oral Daily PRN Carissa Osier, APRN - CNP        dofetilide (TIKOSYN) capsule 250 mcg  250 mcg Oral 2 times per day Kirt Mullen, APRN - CNP   250 mcg at 03/14/22 2114       Objective:     Telemetry monitor: AF    Physical Exam:  Constitutional and general appearance: alert, cooperative, no distress and appears stated age  HEENT: PERRL, no cervical lymphadenopathy. No masses palpable. Normal oral mucosa  Respiratory:  · Normal excursion and expansion without use of accessory muscles  · Resp auscultation: Normal breath sounds without wheezing, rhonchi, and rales  Cardiovascular:  · The apical impulse is not displaced  · Heart tones are crisp and normal. irregular S1 and S2.  · Jugular venous pulsation Normal  · The carotid upstroke is normal in amplitude and contour without delay or bruit  · Peripheral pulses are symmetrical and full   Abdomen:  · No masses or tenderness  · Bowel sounds present  Extremities:  ·  No cyanosis or clubbing  ·  No lower extremity edema  ·  Skin: warm and dry  Neurological:  · Alert and oriented  · Moves all extremities well  · No abnormalities of mood, affect, memory, mentation, or behavior are noted    Data    Echo 1/17/2022:  Conclusions      Summary   ROBERTO performed by Dr. Jonathan Oglesby. Left ventricular ejection fraction is visually estimated at 45%. Right ventricular systolic function is reduced. Biatrial dilatation. There is no evidence of mass or thrombus in the left atrium or appendage. A bubble study was performed and shows no evidence of a right-to-left atrial   level shunt.    There is mild posterior mitral annular calcification and restricted motion   of the posterior mitral leaflet which fails to fully coapt. Moderate mitral regurgitation with multiple jets noted. Moderate tricuspid regurgitation. All labs and testing reviewed. Lab Review     Renal Profile:   Lab Results   Component Value Date    CREATININE 0.9 03/15/2022    BUN 21 03/15/2022     03/15/2022    K 4.3 03/15/2022    K 3.7 11/04/2021     03/15/2022    CO2 25 03/15/2022     CBC:    Lab Results   Component Value Date    WBC 10.0 01/17/2022    RBC 4.32 01/17/2022    HGB 12.3 01/17/2022    HCT 38.0 01/17/2022    MCV 88.1 01/17/2022    RDW 14.8 01/17/2022     01/17/2022     BNP:  No results found for: BNP  Fasting Lipid Panel:  No results found for: CHOL, HDL, TRIG  Cardiac Enzymes:  CK/MbTroponin  Lab Results   Component Value Date    TROPONINI <0.01 10/30/2021     PT/ INR No results found for: INR, PROTIME  PTT No results found for: PTT   Lab Results   Component Value Date    MG 1.80 03/15/2022      Lab Results   Component Value Date    TSH 1.75 09/25/2013       Assessment:  Symptomatic persistent atrial fibrillation: ongoing    -[id number] score 5 (CHF, age, gender, HTN)   -Tikosyn started 3/14/2022   -s/p DCCV 1/17/2022 and reported CV in [place] 2/2022  Nonischemic cardiomyopathy: ongoing    -EF 35-40% on echo 54/4811  Chronic systolic CHF: compensated   CAD   -nonobstructive on [facility] 11/2021  HTN: suboptimal   Pulmonary HTN   Hypothyroidism on Synthroid     Plan:   1. Continue Eliquis, ASA, Lasix, losartan and Toprol   2. Continue Tikosyn  3. Daily BMP and magnesium  4. Keep K+ over 4 and mag over 2  5. EKG 2 hours after the first 5 doses  6. Continuous telemetry monitoring  7. NPO at midnight for possible DCCV tomorrow if AF is persistent. Disposition: Likely 3/16/2022 after 5th dose of Tikosyn.        Darnell Pabon, APRN-CNP  [address] [facility] [address]  [phone]

## 2022-03-16 ENCOUNTER — APPOINTMENT (OUTPATIENT)
Dept: CARDIAC CATH/INVASIVE PROCEDURES | Age: 82
DRG: 309 | End: 2022-03-16
Attending: INTERNAL MEDICINE
Payer: MEDICARE

## 2022-03-16 ENCOUNTER — TELEPHONE (OUTPATIENT)
Dept: NON INVASIVE DIAGNOSTICS | Age: 82
End: 2022-03-16

## 2022-03-16 VITALS
DIASTOLIC BLOOD PRESSURE: 69 MMHG | HEART RATE: 61 BPM | WEIGHT: 144.8 LBS | TEMPERATURE: 97.3 F | HEIGHT: 67 IN | OXYGEN SATURATION: 96 % | SYSTOLIC BLOOD PRESSURE: 140 MMHG | RESPIRATION RATE: 17 BRPM | BODY MASS INDEX: 22.73 KG/M2

## 2022-03-16 DIAGNOSIS — R00.2 PALPITATION: Primary | ICD-10-CM

## 2022-03-16 DIAGNOSIS — R06.02 SOB (SHORTNESS OF BREATH): ICD-10-CM

## 2022-03-16 LAB
ANION GAP SERPL CALCULATED.3IONS-SCNC: 10 MMOL/L (ref 3–16)
BUN BLDV-MCNC: 20 MG/DL (ref 7–20)
CALCIUM SERPL-MCNC: 8.8 MG/DL (ref 8.3–10.6)
CHLORIDE BLD-SCNC: 106 MMOL/L (ref 99–110)
CO2: 25 MMOL/L (ref 21–32)
CREAT SERPL-MCNC: 0.8 MG/DL (ref 0.6–1.2)
EKG ATRIAL RATE: 277 BPM
EKG ATRIAL RATE: 62 BPM
EKG ATRIAL RATE: 93 BPM
EKG DIAGNOSIS: NORMAL
EKG P AXIS: 60 DEGREES
EKG P-R INTERVAL: 218 MS
EKG Q-T INTERVAL: 394 MS
EKG Q-T INTERVAL: 404 MS
EKG Q-T INTERVAL: 468 MS
EKG QRS DURATION: 86 MS
EKG QRS DURATION: 86 MS
EKG QRS DURATION: 96 MS
EKG QTC CALCULATION (BAZETT): 422 MS
EKG QTC CALCULATION (BAZETT): 472 MS
EKG QTC CALCULATION (BAZETT): 475 MS
EKG R AXIS: 51 DEGREES
EKG R AXIS: 57 DEGREES
EKG R AXIS: 66 DEGREES
EKG T AXIS: -25 DEGREES
EKG T AXIS: -34 DEGREES
EKG T AXIS: 11 DEGREES
EKG VENTRICULAR RATE: 62 BPM
EKG VENTRICULAR RATE: 69 BPM
EKG VENTRICULAR RATE: 82 BPM
GFR AFRICAN AMERICAN: >60
GFR NON-AFRICAN AMERICAN: >60
GLUCOSE BLD-MCNC: 106 MG/DL (ref 70–99)
MAGNESIUM: 1.8 MG/DL (ref 1.8–2.4)
POTASSIUM SERPL-SCNC: 3.9 MMOL/L (ref 3.5–5.1)
SODIUM BLD-SCNC: 141 MMOL/L (ref 136–145)

## 2022-03-16 PROCEDURE — 83735 ASSAY OF MAGNESIUM: CPT

## 2022-03-16 PROCEDURE — 93010 ELECTROCARDIOGRAM REPORT: CPT | Performed by: INTERNAL MEDICINE

## 2022-03-16 PROCEDURE — 99239 HOSP IP/OBS DSCHRG MGMT >30: CPT | Performed by: NURSE PRACTITIONER

## 2022-03-16 PROCEDURE — 6370000000 HC RX 637 (ALT 250 FOR IP): Performed by: NURSE PRACTITIONER

## 2022-03-16 PROCEDURE — 99152 MOD SED SAME PHYS/QHP 5/>YRS: CPT | Performed by: INTERNAL MEDICINE

## 2022-03-16 PROCEDURE — 92960 CARDIOVERSION ELECTRIC EXT: CPT | Performed by: INTERNAL MEDICINE

## 2022-03-16 PROCEDURE — 80048 BASIC METABOLIC PNL TOTAL CA: CPT

## 2022-03-16 PROCEDURE — 36415 COLL VENOUS BLD VENIPUNCTURE: CPT

## 2022-03-16 PROCEDURE — 92960 CARDIOVERSION ELECTRIC EXT: CPT

## 2022-03-16 PROCEDURE — 6370000000 HC RX 637 (ALT 250 FOR IP)

## 2022-03-16 PROCEDURE — 6360000002 HC RX W HCPCS: Performed by: NURSE PRACTITIONER

## 2022-03-16 PROCEDURE — 93005 ELECTROCARDIOGRAM TRACING: CPT | Performed by: INTERNAL MEDICINE

## 2022-03-16 PROCEDURE — 5A2204Z RESTORATION OF CARDIAC RHYTHM, SINGLE: ICD-10-PCS | Performed by: INTERNAL MEDICINE

## 2022-03-16 PROCEDURE — 6360000002 HC RX W HCPCS

## 2022-03-16 PROCEDURE — 2580000003 HC RX 258

## 2022-03-16 PROCEDURE — 2500000003 HC RX 250 WO HCPCS

## 2022-03-16 RX ORDER — MAGNESIUM SULFATE 1 G/100ML
1000 INJECTION INTRAVENOUS ONCE
Status: COMPLETED | OUTPATIENT
Start: 2022-03-16 | End: 2022-03-16

## 2022-03-16 RX ORDER — POTASSIUM CHLORIDE 20 MEQ/1
40 TABLET, EXTENDED RELEASE ORAL ONCE
Status: COMPLETED | OUTPATIENT
Start: 2022-03-16 | End: 2022-03-16

## 2022-03-16 RX ORDER — DOFETILIDE 0.25 MG/1
250 CAPSULE ORAL EVERY 12 HOURS SCHEDULED
Qty: 60 CAPSULE | Refills: 3 | Status: SHIPPED | OUTPATIENT
Start: 2022-03-16 | End: 2022-07-13 | Stop reason: SDDI

## 2022-03-16 RX ORDER — DOFETILIDE 0.25 MG/1
250 CAPSULE ORAL 2 TIMES DAILY
Qty: 180 CAPSULE | Refills: 3 | Status: SHIPPED | OUTPATIENT
Start: 2022-03-16 | End: 2022-03-28 | Stop reason: SDUPTHER

## 2022-03-16 RX ADMIN — FUROSEMIDE 20 MG: 20 TABLET ORAL at 09:23

## 2022-03-16 RX ADMIN — MAGNESIUM SULFATE HEPTAHYDRATE 1000 MG: 1 INJECTION, SOLUTION INTRAVENOUS at 09:29

## 2022-03-16 RX ADMIN — LOSARTAN POTASSIUM 50 MG: 25 TABLET, FILM COATED ORAL at 09:23

## 2022-03-16 RX ADMIN — ASPIRIN 81 MG 81 MG: 81 TABLET ORAL at 10:45

## 2022-03-16 RX ADMIN — SODIUM CHLORIDE, PRESERVATIVE FREE 10 ML: 5 INJECTION INTRAVENOUS at 09:23

## 2022-03-16 RX ADMIN — METOPROLOL SUCCINATE 75 MG: 50 TABLET, EXTENDED RELEASE ORAL at 09:23

## 2022-03-16 RX ADMIN — ESCITALOPRAM OXALATE 10 MG: 10 TABLET ORAL at 09:23

## 2022-03-16 RX ADMIN — DOFETILIDE 250 MCG: 0.25 CAPSULE ORAL at 09:23

## 2022-03-16 RX ADMIN — APIXABAN 5 MG: 5 TABLET, FILM COATED ORAL at 10:45

## 2022-03-16 RX ADMIN — POTASSIUM CHLORIDE 40 MEQ: 20 TABLET, EXTENDED RELEASE ORAL at 09:23

## 2022-03-16 RX ADMIN — LEVOTHYROXINE SODIUM 112 MCG: 0.11 TABLET ORAL at 06:10

## 2022-03-16 ASSESSMENT — PAIN SCALES - GENERAL
PAINLEVEL_OUTOF10: 0
PAINLEVEL_OUTOF10: 0

## 2022-03-16 NOTE — PROCEDURES
[facility], [place]     Electrophysiology Procedure Note       Date of Procedure: 3/16/2022  Patient's Name: Priscilla Marie  YOB: 1940   Medical Record Number: [id number]  Referring Physician: Brittaney att. providers found  Procedure Performed by: Radha Roth MD    Procedures performed:  · Anesthesia: Monitored Anesthesia Care  · Level of sedation plan: Moderate sedation (conscious sedation) with intravenous Midazolam 1 mg, and Methohexital 70 mg  · Start time: 1410  · Stop time: 1420  · Mallampati airway assessment class: II  · ASA class: II  · (there were no independent trained observers who had no other duties involved in this procedure)  · External Electrical cardioversion     Indication of the procedure: Symptomatic persistent atrial fibrillation    Details of procedure: The patient was brought to the cath lab area in a fasting and non-sedated state. The risks, benefits and alternatives of the procedure were discussed with the patient. The patient opted to proceed with the procedure. Written informed consent was signed and placed in the chart. A timeout protocol was completed to identify the patient and the procedure being performed. Patient is on chronic anticoagulation therapy. The patient was monitored continuously with ECG, pulse oximetry, blood pressure monitoring, and direct observation. IV sedation was provided with IV Versed, and methohexital. Electrical DC cardioversion was performed using 200J, synchronized shock. Patient was converted to sinus rhythm (type II break). Specimen collected: none    Estimated blood loss: none    The patient tolerated the procedure well and there were no complications. Conclusion:   Successful external DC cardioversion of symptomatic persistent atrial fibrillation. .     Plan:   The patient can be discharged if remains stable. Will continue with dofetilide 250 mcg BID metoprolol, and apixaban, .  The patient will follow-up with Dr. Vandana Couch and EP team.  Thank you for allowing me to participate in the care of this patient. If you have any questions, please feel free to contact me.     Nohemy Stapleton MD  Cardiac Electrophysiology  [address] [facility]  [phone] [facility]

## 2022-03-16 NOTE — DISCHARGE SUMMARY
no CVA tenderness   Lungs:     Clear to auscultation bilaterally, respirations unlabored   Chest wall:    No tenderness or deformity   Heart:    Regular rate and rhythm , S1 and S2 normal, no murmur, rub   or gallop   Abdomen:     Soft, non-tender, bowel sounds active all four quadrants,     no masses, no organomegaly   Genitalia:    deferred   Rectal:    deferred    Extremities:   Extremities normal, atraumatic, no cyanosis or edema   Pulses:   2+ and symmetric all extremities   Skin:   Skin color, texture, turgor normal, no rashes or lesions   Lymph nodes:   Cervical, supraclavicular, and axillary nodes normal   Neurologic:   CNII-XII intact. Normal strength, sensation and reflexes       throughout     Disposition: home    Patient Instructions:      Medication List      START taking these medications    dofetilide 250 MCG capsule  Commonly known as: TIKOSYN  Take 1 capsule by mouth every 12 hours        CONTINUE taking these medications    apixaban 5 MG Tabs tablet  Commonly known as: Eliquis  Take 1 tablet by mouth 2 times daily     aspirin 81 MG chewable tablet  Take 1 tablet by mouth daily     budesonide 3 MG extended release capsule  Commonly known as: ENTOCORT EC     escitalopram 10 MG tablet  Commonly known as: LEXAPRO     esomeprazole 20 MG delayed release capsule  Commonly known as: NEXIUM     furosemide 20 MG tablet  Commonly known as: LASIX  TAKE 1 TABLET BY MOUTH DAILY     levothyroxine 112 MCG tablet  Commonly known as: SYNTHROID     losartan 50 MG tablet  Commonly known as: COZAAR     * metoprolol succinate 25 MG extended release tablet  Commonly known as: TOPROL XL  Take 1 tablet by mouth 2 times daily     * metoprolol succinate 50 MG extended release tablet  Commonly known as: TOPROL XL  Take 1.5 tablets by mouth 2 times daily         * This list has 2 medication(s) that are the same as other medications prescribed for you.  Read the directions carefully, and ask your doctor or other care provider to review them with you. STOP taking these medications    DIGOXIN PO     nitrofurantoin (macrocrystal-monohydrate) 100 MG capsule  Commonly known as: MACROBID           Where to Get Your Medications      These medications were sent to [address] [phone] Fabricio Joseph [phone]  [address], [address]    Phone: [phone]   · dofetilide 250 MCG capsule         Activity: as tolerated  Diet: cardiac diet      Renu Gonzalez, APRN-HERMAN  [address]  [phone]     Time spent on discharge of patient: 35 minutes, including plan of care, patient education, and care coordination.

## 2022-03-16 NOTE — PLAN OF CARE
Patient remains in AF after 5th dose of Tikosyn. NPO for DCCV this afternoon woth Dr. Aaron Dupree. She has not missed any doses of Eliquis in the last 30 days. (risks, benefits, and alternative therapy discussed). Likely to discharge post CV.      Denver Benedict, CECILIA-HERMAN [address]  [phone]

## 2022-03-16 NOTE — FLOWSHEET NOTE
03/15/22 2036   Assessment   Charting Type Shift assessment   Neurological   Neuro (WDL) WDL   Level of Consciousness Alert (0)   Jeanne Coma Scale   Eye Opening 4   Best Verbal Response 5   Best Motor Response 6   Jeanne Coma Scale Score 15   HEENT   HEENT (WDL) X   Teeth Partial plate lower   Left Eye Impaired vision   Right Eye Impaired vision   Respiratory   Respiratory (WDL) WDL   Cardiac   Cardiac (WDL) X   Cardiac Regularity Irregular   Cardiac Monitor   Telemetry Monitor On Yes   Telemetry Audible Yes   Telemetry Alarms Set Yes   Gastrointestinal   Abdominal (WDL) WDL   Peripheral Vascular   Peripheral Vascular (WDL) WDL   Skin Color/Condition   Skin Color/Condition (WDL) WDL   Skin Integrity   Skin Integrity (WDL) X   Skin Integrity Bruising   Location scattered   Musculoskeletal   Musculoskeletal (WDL) WDL   Genitourinary   Genitourinary (WDL) WDL   Anus/Rectum   Anus/Rectum (WDL) WDL   Psychosocial   Psychosocial (WDL) WDL

## 2022-03-16 NOTE — PROGRESS NOTES
Writer reviewed discharge orders with patient. All questions answered. IV removed with no complications. Patient taken to the lobby via wheelchair. Medication picked up from the pharmacy.     3/16/2022  Margy Denise RN

## 2022-03-18 ENCOUNTER — NURSE ONLY (OUTPATIENT)
Dept: CARDIOLOGY CLINIC | Age: 82
End: 2022-03-18

## 2022-03-18 ENCOUNTER — TELEPHONE (OUTPATIENT)
Dept: CARDIOLOGY CLINIC | Age: 82
End: 2022-03-18

## 2022-03-18 DIAGNOSIS — I48.91 RAPID ATRIAL FIBRILLATION (HCC): Primary | ICD-10-CM

## 2022-03-18 NOTE — TELEPHONE ENCOUNTER
Monitor placed by BEST Metcalf  Monitor company [employer]  Length of monitor 14 days  Monitor ordered by Dave [id number] successful prior to pt leaving office?  Yes

## 2022-03-21 ENCOUNTER — TELEPHONE (OUTPATIENT)
Dept: CARDIOLOGY CLINIC | Age: 82
End: 2022-03-21

## 2022-03-21 NOTE — LETTER
[address] Cardiology - [address]  Phone: [phone]  Fax: [phone]    Anya Chakraborty MD        March 24, 2022    Aletha Liangon   1940  [address] [facility]  [place] [address]      Dear To Whom This May Concern,    Joey Mendez at low risk for MACE during a low risk procedure. Agree with not holding [facility] given recent cardioversion. If you have any questions or concerns, please don't hesitate to call.     Sincerely,        Anya Chakraborty MD

## 2022-03-21 NOTE — PROCEDURES
[address]     Electrophysiology Procedure Note       Date of Procedure: 3/20/2022  Patient's Name: Tito Moffett  YOB: 1940   Medical Record Number: [id number]  Referring Physician: Brittaney att. providers found  Procedure Performed by: Brad Aguirre MD    Procedures performed:  · Anesthesia: Monitored Anesthesia Care  · Level of sedation plan: Moderate sedation (conscious sedation) with intravenous Midazolam 1 mg, and Methohexital 50 mg  · Start time: 1310  · Stop time: 1320  · Mallampati airway assessment class: II  · ASA class: II  · (there were no independent trained observers who had no other duties involved in this procedure)  · External Electrical cardioversion     Indication of the procedure: Symptomatic persistent atrial fibrillation    Details of procedure: The patient was brought to the cath lab area in a fasting and non-sedated state. The risks, benefits and alternatives of the procedure were discussed with the patient. The patient opted to proceed with the procedure. Written informed consent was signed and placed in the chart. A timeout protocol was completed to identify the patient and the procedure being performed. Patient is on chronic anticoagulation therapy. The patient was monitored continuously with ECG, pulse oximetry, blood pressure monitoring, and direct observation. IV sedation was provided with IV Versed, and methohexital. Electrical DC cardioversion was performed using 200J, synchronized shock. Patient was converted to sinus rhythm. Specimen collected: none    Estimated blood loss: none    The patient tolerated the procedure well and there were no complications. Conclusion:   Successful external DC cardioversion of symptomatic persistent atrial fibrillation. Plan:   The patient can be discharged if remains stable. Will continue with dofetilide, metoprolol, and apixaban. The patient will follow-up with Dr. Daniel Chery as an outpatient. .    Thank you for allowing me to participate in the care of this patient. If you have any questions, please feel free to contact me.     Denisse Ash MD  Cardiac Electrophysiology  [address] [facility]  [phone] [facility]

## 2022-03-21 NOTE — TELEPHONE ENCOUNTER
Pt will be having a Cystoscopy with Dr. Margaret Marrero. This is not yet scheduled. Pt will be under MAC anesthesia. They are not requesting that Eliquis and ASA be held prior. If clearance is given please fax letter to [phone]. Last OV 03/01/2022 with [facility].

## 2022-03-23 ENCOUNTER — TELEPHONE (OUTPATIENT)
Dept: CARDIOLOGY CLINIC | Age: 82
End: 2022-03-23

## 2022-03-23 NOTE — TELEPHONE ENCOUNTER
We received a report from [facility]  For pt showing afib. patient had DCCV 3/18/22. Report attached to this encounter.

## 2022-03-23 NOTE — TELEPHONE ENCOUNTER
Please let patient know, atrial fibrillation is back despite dofetilide. [place] improved however continues to have some. Consider stronger antiarrhythmic, ablation, or rate control. I'd be happy to see her tomorrow or next week if she'd like to sit down. Thanks.

## 2022-03-23 NOTE — TELEPHONE ENCOUNTER
Patient at low risk for MACE during a low risk procedure. Agree with not holding [address] given recent cardioversion. If do need to hold, can only be done one month after cardioversion.

## 2022-03-24 NOTE — TELEPHONE ENCOUNTER
Spoke with pt, relayed message from [address] [place] [address],[address]. Cardiac clearance letter faxed and waiting confirmation.

## 2022-03-24 NOTE — TELEPHONE ENCOUNTER
[facility],[address] first available is 4/5/2022 at 4:15. NPALVA has no availability. Called patient and scheduled.  She V/U.

## 2022-03-28 DIAGNOSIS — I48.91 RAPID ATRIAL FIBRILLATION (HCC): Primary | ICD-10-CM

## 2022-03-28 RX ORDER — DOFETILIDE 0.25 MG/1
250 CAPSULE ORAL 2 TIMES DAILY
Qty: 180 CAPSULE | Refills: 1 | Status: SHIPPED | OUTPATIENT
Start: 2022-03-28 | End: 2022-04-08

## 2022-03-28 NOTE — TELEPHONE ENCOUNTER
03/28-[facility] contacted I requesting a refill for Dofetilide. Please refill and send to pts new pharmacy.      [facility] - [place], [place] - [address] [place] [address] Newton Nose [phone] Angy Marques [phone]      Medication Refill    Medication needing refilled: Dofetilide    Dosage of the medication: 250 MCG     How are you taking this medication (QD, BID, TID, QID, PRN): 1 cap every 12 hours    30 or 90 day supply called in: 60 caps    When will you run out of your medication: now    Which Pharmacy are we sending the medication to?:    [facility]. Hernan Jensen [address] [place] [address] [facility] [phone] - f [phone]

## 2022-04-05 ENCOUNTER — OFFICE VISIT (OUTPATIENT)
Dept: CARDIOLOGY CLINIC | Age: 82
End: 2022-04-05
Payer: COMMERCIAL

## 2022-04-05 ENCOUNTER — TELEPHONE (OUTPATIENT)
Dept: CARDIOLOGY CLINIC | Age: 82
End: 2022-04-05

## 2022-04-05 VITALS
OXYGEN SATURATION: 100 % | BODY MASS INDEX: 22.44 KG/M2 | HEIGHT: 67 IN | DIASTOLIC BLOOD PRESSURE: 62 MMHG | HEART RATE: 100 BPM | SYSTOLIC BLOOD PRESSURE: 138 MMHG | WEIGHT: 143 LBS

## 2022-04-05 DIAGNOSIS — I42.9 CARDIOMYOPATHY, UNSPECIFIED TYPE (HCC): ICD-10-CM

## 2022-04-05 DIAGNOSIS — Z01.818 PRE-OP TESTING: Primary | ICD-10-CM

## 2022-04-05 DIAGNOSIS — I48.19 PERSISTENT ATRIAL FIBRILLATION (HCC): ICD-10-CM

## 2022-04-05 DIAGNOSIS — I49.3 PVC (PREMATURE VENTRICULAR CONTRACTION): ICD-10-CM

## 2022-04-05 DIAGNOSIS — I48.91 RAPID ATRIAL FIBRILLATION (HCC): Primary | ICD-10-CM

## 2022-04-05 PROCEDURE — 99214 OFFICE O/P EST MOD 30 MIN: CPT | Performed by: INTERNAL MEDICINE

## 2022-04-05 PROCEDURE — 93000 ELECTROCARDIOGRAM COMPLETE: CPT | Performed by: INTERNAL MEDICINE

## 2022-04-05 NOTE — PATIENT INSTRUCTIONS
RECOMMENDATIONS:  1. Discussed ablation procedure. Patient would like to proceed with this option. We will call you to set this up. 2. Continue current medications as prescribed. 3. Follow up after ablation.

## 2022-04-05 NOTE — TELEPHONE ENCOUNTER
Patient was seen in office 4/5/2022. AF ablation discussed and agreed upon by [address] and patient. Medications not discussed. Patient will need covid testing prior. Thank you!

## 2022-04-05 NOTE — PROGRESS NOTES
[address]   Electrophysiology Consult Note            Date: 4/5/22  Patient Name: Giselle Nava  YOB: 1940    Primary Care Physician: Ania Julien MD    CHIEF COMPLAINT:   Chief Complaint   Patient presents with    Follow-up    Cardiomyopathy    Atrial Fibrillation    Other     PVC, pt would like to talk about tikosyn       HISTORY OF PRESENT ILLNESS: Giselle Nava is a [de-identified] y.o. female with a PMH significant for AF, CHF, ICM, pulmonary HTN. Patient had echo in 2005 that demonstrated an EF of 40-45%. She underwent a cardiac cath at that time that demonstrated significant coronary disease. Her EF recovered in 2014 to 60-65%. Patient was diagnosed with AF in 10/2021. She was admitted in 11/2021 with UTI and sepsis and was found to be in AF RVR at this time. Her echo on 11/3/2021 demonstrated 35-40%. Patient underwent DCCV on 1/17/2022. Patient was recently in [place] and went in to AF and required a CV there. On 3/1/2022, ECG demonstrates AF (86). She states that she is doing well. She is fatigued when she is in AF. She feels like her energy levels are lower than they used to be. Patient was admitted for Tikosyn loading on 3/14/2022. Patient underwent DCCV on 3/14/2022. Today, 4/5/2022, ECG demonstrates AF (100). She states that she feels ok. She felt better and had a lot more energy when she was in normal rhythm. She is taking her medications as prescribed. Patient denies current edema, chest pain, sob, palpitations, dizziness or syncope. Past Medical History:   has a past medical history of COVID-19, Hypertension, and Thyroid disease. Past Surgical History:   has a past surgical history that includes Hemorrhoid surgery; Hysterectomy; Tonsillectomy; Knee arthroscopy (Right, 03/10/2016); Ovary removal; and Cardioversion (03/16/2022). Allergies:  Shellfish-derived products    Social History:   reports that she quit smoking about 42 years ago.  She has never used smokeless tobacco. She reports current alcohol use of about 12.0 - 14.0 standard drinks of alcohol per week. She reports that she does not use drugs. Family History: family history is not on file. Home Medications:    Prior to Admission medications    Medication Sig Start Date End Date Taking? Authorizing Provider   dofetilide (TIKOSYN) 250 MCG capsule Take 1 capsule by mouth 2 times daily 3/28/22 6/26/22 Yes CECILIA Sadler CNP   dofetilide (TIKOSYN) 250 MCG capsule Take 1 capsule by mouth every 12 hours 3/16/22  Yes CECILIA Sadler CNP   esomeprazole (NEXIUM) 20 MG delayed release capsule TAKE 1 CAPSULE BY MOUTH DAILY 2/11/22  Yes Historical Provider, MD   metoprolol succinate (TOPROL XL) 50 MG extended release tablet Take 1.5 tablets by mouth 2 times daily 2/16/22  Yes Oli Garcia DO   losartan (COZAAR) 50 MG tablet Take 50 mg by mouth daily   Yes Historical Provider, MD   apixaban (ELIQUIS) 5 MG TABS tablet Take 1 tablet by mouth 2 times daily 12/18/21  Yes Oli Garcia DO   aspirin 81 MG chewable tablet Take 1 tablet by mouth daily 12/18/21 4/17/22 Yes Oli Garcia DO   furosemide (LASIX) 20 MG tablet TAKE 1 TABLET BY MOUTH DAILY 12/13/21 4/5/22 Yes Oli Garcia DO   budesonide (ENTOCORT EC) 3 MG extended release capsule Take 3 mg by mouth every morning    Yes Historical Provider, MD   levothyroxine (SYNTHROID) 112 MCG tablet Take 112 mcg by mouth Daily   Yes Historical Provider, MD   escitalopram (LEXAPRO) 10 MG tablet Take 10 mg by mouth daily    Yes Historical Provider, MD   metoprolol succinate (TOPROL XL) 25 MG extended release tablet Take 1 tablet by mouth 2 times daily  Patient not taking: Reported on 4/5/2022 11/16/21   Oli Garcia DO       REVIEW OF SYSTEMS:    All 14-point review of systems are completed and  pertinent positives are mentioned in the history of present illness. Other  systems are reviewed and are negative.     Physical Examination:    /62   Pulse 100 Ht 5' 7\" (1.702 m)   Wt 143 lb (64.9 kg)   SpO2 100%   BMI 22.40 kg/m²      Constitutional and General Appearance:    alert, cooperative, no distress and appears stated age  [de-identified]:    PERRLA, no cervical lymphadenopathy. No masses palpable. Normal oral  mucosa  Respiratory:  · Normal excursion and expansion without use of accessory muscles  · Resp Auscultation: Normal breath sounds without dullness or wheezing  Cardiovascular:  · The apical impulse is not displaced  · Irregular rate and rhythm wo M/G/R  Abdomen:  · No masses or tenderness  · Bowel sounds present  Extremities:  ·  No Cyanosis or Clubbing  ·  Lower extremity edema: No  · Skin: Warm and dry  Neurological:  · Alert and oriented. · Moves all extremities well  · No abnormalities of mood, affect, memory, mentation, or behavior are noted    DATA:    ECG 4/5/22: Personally reviewed. ROBERTO 1/17/2022   Summary   ROBERTO performed by Dr. Joey Bennett. Left ventricular ejection fraction is visually estimated at 45%. Right ventricular systolic function is reduced. Biatrial dilatation. There is no evidence of mass or thrombus in the left atrium or appendage. A bubble study was performed and shows no evidence of a right-to-left atrial   level shunt. There is mild posterior mitral annular calcification and restricted motion   of the posterior mitral leaflet which fails to fully coapt. Moderate mitral regurgitation with multiple jets noted. Moderate tricuspid regurgitation. Cardiac cath 10/30/2021  Impression:  1. Non-obstructive coronary artery disease. 2. Non-ischemic cardiomyopathy. 3. Acute biventricular systolic heart failure with mildly elevated left and right-sided cardiac filling pressures. 4. Mild pulmonary hypertension. 5. Preserved Danish cardiac output and index. 6. Persistent atrial fibrillation. IMPRESSION:    1.  Persistent symptomatic atrial fibrillation ([id number] score 3)  03/01/2022  Patient is a pleasant 45-year-old female with a medical history significant for hypothyroidism and depression who presents from home to [facility] care. Patient has had paroxysmal atrial fibrillation and has been symptomatic with palpitations, fatigue, shortness of breath. She is undergone 2 cardioversions and now presents in atrial fibrillation. We discussed anticoagulation (NOAC and warfarin), rate control, rhythm control, AVN + pacemaker, and atrial fibrillation ablation. We reviewed risks and benefits of each approach. We discussed side effects of class IC, class II, class III, and class IV antiarrhythmics. All questions were answered. Patient would like to start with dofetilide and so we will schedule an induction. All questions concerns addressed. - Schedule dofetilide admission.  - Continue apixaban 5 mg BID.  - Continue metoprolol 50 mg daily. 4/5/2022  Patient presents from home for follow up having recently been admitted for dofetilide. She is back in atrial fibrillation and is symptomatic. We discussed amiodarone vs ablation. Patient would like to move forward with ablation and so we will schedule. - Continue dofetilide.  - Continue metoprolol.  - Continue apixaban 5 mg BID. - Schedule ablation. Of note, I follow her , Anette Loya. RECOMMENDATIONS:  1. Discussed ablation procedure. Patient would like to proceed with this option. We will call you to set this up. 2. Continue current medications as prescribed. 3. Follow up after ablation. QUALITY MEASURES  1. Tobacco Cessation Counseling: NA  2. Retake of BP if >140/90:   NA  3. Documentation to PCP/referring for new patient:  Sent to PCP at close of office visit  4. CAD patient on anti-platelet: NA  5. CAD patient on STATIN therapy:  NA  6. Patient with CHF and aFib on anticoagulation:  Yes     All questions and concerns were addressed to the patient/family. Alternatives to my treatment were discussed.     Dr. Monalisa Ynaez MD  Electrophysiology  [facility] [address]. [place] [address]  Phone: [phone]  Fax: [phone]     NOTE: This report was transcribed using voice recognition software. Every effort was made to ensure accuracy, however, inadvertent computerized transcription errors may be present. Krishna Vargas RN, am scribing for and in the presence of Dr. Rosa Silva. 04/05/22 4:20 PM   Helena Welsh RN     The scribe's documentation has been prepared under my direction and personally reviewed by me in its entirety. I confirm that the note above accurately reflects all work, physical examination, the discussion of treatments and procedures, and medical decision making performed by me. Neris Jung MD personally performed the services described in this documentation as scribed by nurse in my presence, and is both accurate and complete.     Electronically signed by Lacho Echeverria MD on 4/5/2022 at 5:17 PM

## 2022-04-06 PROCEDURE — 93248 EXT ECG>7D<15D REV&INTERPJ: CPT | Performed by: INTERNAL MEDICINE

## 2022-04-07 RX ORDER — METOPROLOL SUCCINATE 25 MG/1
25 TABLET, EXTENDED RELEASE ORAL 2 TIMES DAILY
Qty: 180 TABLET | Refills: 3 | Status: SHIPPED | OUTPATIENT
Start: 2022-04-07 | End: 2022-09-15

## 2022-04-07 NOTE — TELEPHONE ENCOUNTER
Metoprolol 75 mg BID is documented. Will approve these 25 mg tabs to be taken twice daily with 50 mg oral tablets that she should likewise be taking twice daily. Correct me if I have misunderstood the dosing.  TY

## 2022-04-07 NOTE — TELEPHONE ENCOUNTER
[facility]   Last OV 1 [facility] 2/16/2022  Upcoming OV 8/17/2022    Per 1 [facility] 2/16/2022 OV note - Metoprolol increased to 75mg once daily.  Rx for Metoprolol 50mg refilled on 2/16/2022 - 1 year supply

## 2022-04-12 NOTE — TELEPHONE ENCOUNTER
Spoke with patient. Patient is scheduled with Dr. Sue Murrieta for Afib Ablation with Carto and anesthesia on 5/23/22 at 12:30pm [facility], arrival time of 11am to the Cath Lab. Please have patient arrive to the main entrance of [facility] and check in with the registration desk. Please call patient regarding medication instructions. Remind patient to be NPO after midnight (8 hours prior). Do not apply lotions/creams on skin the day of procedure. COVID testing - Please put order in chart and have patient obtain within 6 days of procedure.

## 2022-04-18 DIAGNOSIS — I48.19 PERSISTENT ATRIAL FIBRILLATION (HCC): ICD-10-CM

## 2022-04-18 DIAGNOSIS — I49.3 PVC (PREMATURE VENTRICULAR CONTRACTION): ICD-10-CM

## 2022-04-18 DIAGNOSIS — I48.0 PAROXYSMAL ATRIAL FIBRILLATION (HCC): ICD-10-CM

## 2022-05-01 DIAGNOSIS — I48.19 PERSISTENT ATRIAL FIBRILLATION (HCC): ICD-10-CM

## 2022-05-03 RX ORDER — APIXABAN 5 MG/1
TABLET, FILM COATED ORAL
Qty: 180 TABLET | Refills: 2 | Status: SHIPPED | OUTPATIENT
Start: 2022-05-03 | End: 2022-07-13 | Stop reason: SDDI

## 2022-05-03 NOTE — TELEPHONE ENCOUNTER
2/16/2022 [facility]  5/17/2022 upcoming NPNR appt  8/17/2022 upcoming [facility] appt

## 2022-05-10 NOTE — TELEPHONE ENCOUNTER
Spoke with patient. Procedure postponed. Possibly adding to 5/25. She is going to see  NP Tashi Flanagan on 5/17 and we will confirm then. Please review meds regardless.

## 2022-05-19 NOTE — TELEPHONE ENCOUNTER
She cancelled her appointment 5/17/2022 and did not reschedule. She does not want to proceed with any procedures at this time. She was call us back to reschedule when she is ready. Medication instructions for RFCA of AF:   HOLD eliquis and lasix morning of procedure.

## 2022-07-12 NOTE — PROGRESS NOTES
[address]   Electrophysiology Outpatient Note              Date:  July 13, 2022  Patient name: Donal Lozada  YOB: 1940    Primary Care physician: Leigha Atkinson MD    HISTORY OF PRESENT ILLNESS: The patient is a [de-identified] y.o.  female with a history of PAF, nonischemic cardiomyopathy, systolic HF, CAD, HTN, pulmonary HTN, and hypothyroidism   In 2005 she had an echo that showed an EF of 40-45%. She underwent LHC that demonstrated significant CAD. In 2014 EF recovered to 60-65%. In 10/2021 she was diagnosed with AF. In 11/2021 echo showed an EF of 35-40%. In 1/2022 she underwent DCCV. In 2/2022 while out of town she went into AF and required DCCV. In 3/2022 she was admitted for Tikosyn initiation and underwent DCCV. In 4/2022 at [address] she was in AF. RFCA was discussed with plans for procedure to be scheduled. In 5/2022 she called and cancelled procedure. Today she is being seen for PAF. EKG shows SR with a HR of 65 bpm. She has been feeling well for the last few months. She doesn't have as much energy as she used to, although she feels like this is improving. She has a [employer] and has been in [facility] for 2 months. She denies chest pain, palpitations, shortness of breath, and dizziness. She reports that she stopped taking her Tikosyn only weeks after her admission in 3/2022. She stopped taking her Eliquis 3 weeks ago. Her  is adamant that she doesn't need this medication any longer. Her PCP is managing her hypothyroidism and synthroid dosing was adjusted 2.5 months ago. Her SBP is normally 120-125. She does not keep track of her heart rate. She has chronic right knee swelling and follows with ortho for eventual knee replacement. Past Medical History:   has a past medical history of Atrial fibrillation ([address] Cardiomyopathy ([address] COVID-19, Hypertension, Thyroid disease, and Urethral stricture.     Past Surgical History:   has a past surgical history that includes Hemorrhoid surgery; Tonsillectomy; Knee arthroscopy (Right, 03/10/2016); Cardioversion (03/16/2022); and Hysterectomy. Home Medications:    Prior to Admission medications    Medication Sig Start Date End Date Taking? Authorizing Provider   lisinopril (PRINIVIL;ZESTRIL) 5 MG tablet Take 5 mg by mouth daily    Yes Historical Provider, MD   metoprolol succinate (TOPROL XL) 25 MG extended release tablet Take 1 tablet by mouth in the morning and at bedtime To be taken with 50 mg oral tablets also prescribed twice daily for total 75 mg twice daily 4/7/22 4/7/23 Yes Sofia Velarde MD   levothyroxine (SYNTHROID) 112 MCG tablet Take 112 mcg by mouth Daily   Yes Historical Provider, MD   aspirin 81 MG chewable tablet Take 1 tablet by mouth daily 12/18/21 4/17/22  Oli Garcia DO   furosemide (LASIX) 20 MG tablet TAKE 1 TABLET BY MOUTH DAILY  Patient taking differently: Take 20 mg by mouth daily as needed  12/13/21 4/5/22  Oli Garcia DO     Allergies:  Shellfish-derived products    Social History:   reports that she quit smoking about 42 years ago. She has never used smokeless tobacco. She reports current alcohol use of about 1.0 standard drink of alcohol per week. She reports that she does not use drugs. Family History: family history is not on file. All 14 point review of systems are completed and pertinent positives are mentioned in the history of present illness. Other systems are reviewed and are negative. PHYSICAL EXAM:    Vital signs:    BP (!) 130/52   Pulse 69   Ht 5' 7\" (1.702 m)   Wt 150 lb (68 kg)   SpO2 97%   BMI 23.49 kg/m²      Constitutional and general appearance: alert, cooperative, no distress and appears stated age  HEENT: PERRL, no cervical lymphadenopathy. No masses palpable.  Normal oral mucosa  Respiratory:  · Normal excursion and expansion without use of accessory muscles  · Resp auscultation: Normal breath sounds without wheezing, rhonchi, and rales  Cardiovascular:  · The apical impulse is not displaced  · Heart tones are crisp and normal. regular S1 and S2.  · Jugular venous pulsation Normal  · The carotid upstroke is normal in amplitude and contour without delay or bruit  · Peripheral pulses are symmetrical and full   Abdomen:  · No masses or tenderness  · Bowel sounds present  Extremities:  ·  No cyanosis or clubbing  ·  yes, trace right lower extremity edema, no left lower extremity edema  ·  Skin: warm and dry  Neurological:  · Alert and oriented  · Moves all extremities well  · No abnormalities of mood, affect, memory, mentation, or behavior are noted    DATA:    ECG 7/13/2022:  SR, HR 65 bpm    ROBERTO 1/17/2022   Summary   ROBERTO performed by Dr. Ernst Martinez.   Left ventricular ejection fraction is visually estimated at 45%.  Right ventricular systolic function is reduced.   Biatrial dilatation.  Rodolfo [place] is no evidence of mass or thrombus in the left atrium or appendage.   A bubble study was performed and shows no evidence of a right-to-left atrial   level shunt.   There is mild posterior mitral annular calcification and restricted motion   of the posterior mitral leaflet which fails to fully coapt.   Moderate mitral regurgitation with multiple jets noted.   Moderate tricuspid regurgitation.     Cardiac cath 10/30/2021  Impression:  1. Non-obstructive coronary artery disease. 2. Non-ischemic cardiomyopathy. 3. Acute biventricular systolic heart failure with mildly elevated left and right-sided cardiac filling pressures. 4. Mild pulmonary hypertension. 5. Preserved Danish cardiac output and index. 6. Persistent atrial fibrillation. All labs and testing reviewed. CARDIOLOGY LABS:   CBC: No results for input(s): WBC, HGB, HCT, PLT in the last 72 hours. BMP: No results for input(s): NA, K, CO2, BUN, CREATININE, LABGLOM, GLUCOSE in the last 72 hours. PT/INR: No results for input(s): PROTIME, INR in the last 72 hours. APTT:No results for input(s): APTT in the last 72 hours.   FASTING LIPID PANEL:No results found for: HDL, LDLDIRECT, LDLCALC, TRIG  LIVER PROFILE:No results for input(s): AST, ALT, ALB in the last 72 hours. IMPRESSION:    Patient Active Problem List   Diagnosis    Localized osteoarthrosis, lower leg    Tear of lateral cartilage or meniscus of knee, current    Tear of medial meniscus of right knee    Primary osteoarthritis of right knee    Sepsis ([facility]    Cardiomyopathy ([address]    Acute systolic heart failure (HCC)    Dyspnea    Rapid atrial fibrillation (HCC)    Urinary tract infection with hematuria    PVC (premature ventricular contraction)    Hypokalemia    COVID-19    Acute renal failure (HCC)    Persistent atrial fibrillation (HCC)    Pulmonary hypertension ([facility]    Atrial fibrillation (HCC)     Assessment:   Symptomatic paroxsymal atrial fibrillation (formerly persistent): stable               -[id number] score 5 (CHF, age, gender, HTN)              -Tikosyn started 3/14/2022, she stopped medication herself               -s/p DCCV 1/17/2022, CV in [place] 2/2022, and CV 3/16/2022  Nonischemic cardiomyopathy: ongoing               -EF 45% on ROBERTO 1/2022, EF 35-40% on echo 82/0617  Chronic systolic CHF: compensated  CAD              -nonobstructive on [address] [place] [address] 11/2021  HTN: controlled  Pulmonary HTN   Hypothyroidism on Synthroid       Plan:   1. Highly recommend that she be on anticoagulation due to [id number] score of 5 and being at increased risk of thromboembolic events related to PAF. We thoroughly discussed this. She is going to do her own research when she gets home and will reconsider. She has Eliquis tablets left, but will reach out to office if she decides to restart medication. 2. Continue aspirin, Toprol, Lasix, and lisinopril   3. Monitor BP at home and call if consistently out of goal ranges  4. Annual BMP and CBC due in 3/2023  5.  Follow up in 6 months or sooner if needed    CECILIA Cross-CNP  [facility]  [phone]

## 2022-07-13 ENCOUNTER — OFFICE VISIT (OUTPATIENT)
Dept: CARDIOLOGY CLINIC | Age: 82
End: 2022-07-13
Payer: COMMERCIAL

## 2022-07-13 VITALS
BODY MASS INDEX: 23.54 KG/M2 | OXYGEN SATURATION: 97 % | WEIGHT: 150 LBS | DIASTOLIC BLOOD PRESSURE: 52 MMHG | HEART RATE: 69 BPM | SYSTOLIC BLOOD PRESSURE: 130 MMHG | HEIGHT: 67 IN

## 2022-07-13 DIAGNOSIS — I10 ESSENTIAL HYPERTENSION: ICD-10-CM

## 2022-07-13 DIAGNOSIS — I48.0 PAROXYSMAL ATRIAL FIBRILLATION (HCC): Primary | ICD-10-CM

## 2022-07-13 PROCEDURE — 93000 ELECTROCARDIOGRAM COMPLETE: CPT

## 2022-07-13 PROCEDURE — 99214 OFFICE O/P EST MOD 30 MIN: CPT

## 2022-07-13 PROCEDURE — 1123F ACP DISCUSS/DSCN MKR DOCD: CPT

## 2022-07-13 NOTE — PATIENT INSTRUCTIONS
We recommend you be on anticoagulant (blood thinner) to reduce risk of stroke with paroxysmal atrial fibrillation. Consider restarting Eliquis. Call if you need a refill of medication.     Continue current medications     Monitor BP at home and call if consistently out of goal ranges    Keep appointment with Dr. Cong Martell    Follow up in 6 months

## 2022-08-15 ASSESSMENT — ENCOUNTER SYMPTOMS
BLOOD IN STOOL: 0
NAUSEA: 0
EYE PAIN: 0
SORE THROAT: 0
COUGH: 0
CONSTIPATION: 0
PHOTOPHOBIA: 0
DIARRHEA: 0
ABDOMINAL PAIN: 0
RHINORRHEA: 0
SHORTNESS OF BREATH: 0
VOMITING: 0

## 2022-08-15 NOTE — PROGRESS NOTES
[address] [facility]   Cardiovascular Evaluation    PATIENT: Martine Runner: 2022  MRN: [id number]  [facility]: [id number]  : 1940      Primary Care Doctor: Jerry Lucas MD  Reason for evaluation:   Follow-up for atrial fibrillation, non-ischemic cardiomyopathy    Subjective:     Saad Marsh is a [de-identified] y.o. female with a history of atrial fibrillation, biventricular systolic heart failure secondary to non-ischemic cardiomyopathy, non-obstructive coronary artery disease, essential hypertension, mitral regurgitation, mild pulmonary hypertension, and tobacco use who presents for follow-up. The patient previously received her cardiology care at [facility].  She was diagnosed with a mildly reduced LVEF of 40-45% on a TTE from [facility] in . At that time, she underwent invasive coronary angiography which demonstrated no angiographically significant coronary artery disease. Her LVEF had recovered to 60-65% on a repeat TTE from . She was initially evaluated by me in clinic on 10/27/21 for further evaluation of newly diagnosed atrial fibrillation. There were plans to proceed with elective ROBERTO/DCCV once she had an adequate period of anticoagulation with Eliquis. However, she was then admitted to [facility] from 10/30 - 21 with a UTI and sepsis. Both urine and blood cultures grew E. Coli and she was treated with an appropriate course of antibiotics. During her admission, she was in atrial fibrillation with RVR and a TTE obtained on 11/3/21 showed an LVEF of 35-40%, normal RV size with reduced RV systolic function, mild-moderate eccentric and posteriorly-directed mitral regurgitation, mild pulmonic regurgitation, moderate tricuspid regurgitation, and estimated SPAP of 44 mmHg.   She underwent invasive coronary angiography/LHC/RHC on 21 which demonstrated non-obstructive coronary artery disease and the following hemodynamics: RA 8, RV 42/8, PA 40/24 (30), PCWP 17, Danish CO 4.48, and Danish CI 2.43. LVEDP was 15 mmHg. She ultimately underwent ROBERTO and DCCV to sinus rhythm on 1/17/22. LVEF appeared to have improved to ~45% on ROBERTO. She later went back into atrial fibrillation with RVR while vacationing in [place] and was found to have a UTI at that time. She reportedly underwent repeat DCCV while in [place], but only stayed in sinus rhythm for a few hours. She was referred to EP and there were initial plans to proceed with RFCA, but she ultimately declined the procedure. She later spontaneously converted to sinus rhythm. Today, the patient reports reports that she has recently been feeling well. She feels like her energy has improved and she denies any chest pain, shortness of breath, palpitations, lightheadedness, dizziness, or lower extremity edema. She says that she stays very active and doesn't feel like she has any limitations. She says that her  feels very strongly that she should not take Eliquis and she stopped taking it several weeks ago due to easy bruising and bleeding. Patient Active Problem List   Diagnosis    Localized osteoarthrosis, lower leg    Tear of lateral cartilage or meniscus of knee, current    Tear of medial meniscus of right knee    Primary osteoarthritis of right knee    Sepsis ([address]    Cardiomyopathy ([address]    Acute systolic heart failure (HCC)    Dyspnea    Rapid atrial fibrillation (HCC)    Urinary tract infection with hematuria    PVC (premature ventricular contraction)    Hypokalemia    COVID-19    Acute renal failure (HCC)    Persistent atrial fibrillation ([address]    Pulmonary hypertension (HCC)    Atrial fibrillation (HCC)         Past Medical History:   has a past medical history of Atrial fibrillation ([address] Cardiomyopathy ([address] COVID-19, Hypertension, Thyroid disease, and Urethral stricture. Surgical History:   has a past surgical history that includes Hemorrhoid surgery;  Tonsillectomy; Knee arthroscopy (Right, 03/10/2016); Cardioversion (03/16/2022); and Hysterectomy. Social History:   reports that she quit smoking about 42 years ago. Her smoking use included cigarettes. She has never used smokeless tobacco. She reports current alcohol use of about 1.0 standard drink per week. She reports that she does not use drugs. Family History:  No evidence for sudden cardiac death or premature CAD. Home Medications:  Reviewed and are listed in nursing record. and/or listed below  Current Outpatient Medications   Medication Sig Dispense Refill    lisinopril (PRINIVIL;ZESTRIL) 5 MG tablet Take 5 mg by mouth in the morning and at bedtime      metoprolol succinate (TOPROL XL) 25 MG extended release tablet Take 1 tablet by mouth in the morning and at bedtime To be taken with 50 mg oral tablets also prescribed twice daily for total 75 mg twice daily 180 tablet 3    Levothyroxine Sodium 100 MCG CAPS Take 112 mcg by mouth Daily      aspirin 81 MG chewable tablet Take 1 tablet by mouth daily 90 tablet 3    furosemide (LASIX) 20 MG tablet TAKE 1 TABLET BY MOUTH DAILY (Patient taking differently: Take 20 mg by mouth daily as needed ) 90 tablet 3     No current facility-administered medications for this visit. Allergies:  Shellfish-derived products     Review of Systems:   Review of Systems   Constitutional:  Negative for chills and fever. HENT:  Negative for congestion, rhinorrhea and sore throat. Eyes:  Negative for photophobia, pain and visual disturbance. Respiratory:  Negative for cough and shortness of breath. Cardiovascular:  Negative for chest pain, palpitations and leg swelling. Gastrointestinal:  Negative for abdominal pain, blood in stool, constipation, diarrhea, nausea and vomiting. Endocrine: Negative for cold intolerance and heat intolerance. Genitourinary:  Negative for difficulty urinating, dysuria and hematuria. Musculoskeletal:  Negative for arthralgias, joint swelling and myalgias. Skin:  Negative for rash and wound. Allergic/Immunologic: Negative for environmental allergies and food allergies. Neurological:  Negative for dizziness, syncope and light-headedness. Hematological:  Negative for adenopathy. Does not bruise/bleed easily. Psychiatric/Behavioral:  Negative for dysphoric mood. The patient is not nervous/anxious. Objective:   PHYSICAL EXAM:    Vitals:    08/17/22 1300   BP: 136/70   Pulse: 74   SpO2: 100%      Weight: 151 lb (68.5 kg)     Wt Readings from Last 3 Encounters:   08/17/22 151 lb (68.5 kg)   07/13/22 150 lb (68 kg)   04/05/22 143 lb (64.9 kg)     General: Elderly female in no acute distress. Pleasant and interactive on exam.  HEENT: Normocephalic, atraumatic, non-icteric, hearing intact, nares normal, mucous membranes moist.  Neck: No JVD. Heart: Regular rate and rhythm. Normal S1 and S2. Grade II/VI holosystolic murmur. No rubs or gallops. Lungs: Normal respiratory effort. Clear to auscultation bilaterally. No wheezes, rales, or rhonchi. Abdomen: Soft, non-tender. Normoactive bowel sounds. No masses or organomegaly. Skin: No rashes, wounds, or lesions. Pulses: 2+ and symmetric. Extremities: No clubbing, cyanosis, or edema. Psych: Normal mood and affect. Neuro: Alert and oriented to person, place, and time. No focal deficits noted.       LABS   CBC:      Lab Results   Component Value Date/Time    WBC 10.0 01/17/2022 08:07 AM    RBC 4.32 01/17/2022 08:07 AM    HGB 12.3 01/17/2022 08:07 AM    HCT 38.0 01/17/2022 08:07 AM    MCV 88.1 01/17/2022 08:07 AM    RDW 14.8 01/17/2022 08:07 AM     01/17/2022 08:07 AM     CMP:  Lab Results   Component Value Date/Time     03/16/2022 05:17 AM    K 3.9 03/16/2022 05:17 AM    K 3.7 11/04/2021 05:26 AM     03/16/2022 05:17 AM    CO2 25 03/16/2022 05:17 AM    BUN 20 03/16/2022 05:17 AM    CREATININE 0.8 03/16/2022 05:17 AM    GFRAA >60 03/16/2022 05:17 AM    GFRAA >60 02/25/2010 08:50 AM    AGRATIO 0.8 11/01/2021 05:00 AM    LABGLOM >60 03/16/2022 05:17 AM    GLUCOSE 106 03/16/2022 05:17 AM    PROT 5.8 11/01/2021 05:00 AM    PROT 7.6 02/25/2010 08:50 AM    CALCIUM 8.8 03/16/2022 05:17 AM    BILITOT 1.4 11/01/2021 05:00 AM    ALKPHOS 99 11/01/2021 05:00 AM    AST 26 11/01/2021 05:00 AM    ALT 28 11/01/2021 05:00 AM     PT/INR:   No results found for: PTINR  Liver:  No components found for: CHLPL  Lab Results   Component Value Date    ALT 28 11/01/2021    AST 26 11/01/2021    ALKPHOS 99 11/01/2021    BILITOT 1.4 (H) 11/01/2021     Lab Results   Component Value Date    LABA1C 5.7 10/31/2021     Lipids:       No results found for: TRIG       No results found for: HDL       No results found for: LDLCALC       No results found for: LABVLDL      CARDIAC DATA     LAST EKG 8/17/22: Atrial fibrillation with PVCs, non-specific ST abnormality     Echo:  TTE 3/2/05:  RESULTS:    1. Technically adequate study. 2.  Left ventricular function is abnormal.  There are septal and       lateral hypokinesis. Estimated ejection fraction is 40% to 45%. 3.  There is no chamber enlargement. There is no ventricular       hypertrophy. The right sided chambers are not enlarged. There       is mild left atrial enlargement. 4.  The aortic, mitral, tricuspid and pulmonary valves function       adequately. There is no evidence of valvular stenosis or       prolapse. 5.  No thrombus, mass or effusions were seen. 6.  Doppler interrogation revealed normal antegrade velocities. The       mean gradient across the aortic valve is 4.6 mm Hg. Color Doppler       demonstrated mild mitral and tricuspid regurgitation. Right       ventricular systolic pressure was 25 mm Hg. TTE 9/11/14:  Study Conclusions   - Left ventricle: The cavity size was normal. Wall thickness     was normal. Systolic function was normal. The estimated     ejection fraction was in the range of 60% to 65%.  Wall     motion was normal; there were no regional wall motion     abnormalities. Doppler parameters are consistent with     abnormal left ventricular relaxation (grade 1 diastolic     dysfunction). Mitral valve: Mildly calcified annulus. Mildly thickened leaflets. Left atrium: The atrium was     mildly dilated. Inferior vena cava: The vessel was normal     in size; the respirophasic diameter changes were in the     normal range (= 50%); findings are consistent with normal     central venous pressure. TTE 3/12/18:  Study Conclusions   - Left ventricle: The cavity size was normal. There was mild     concentric hypertrophy. Systolic function was normal. The     estimated ejection fraction was in the range of 54% to 58%. Wall     motion was normal; there were no regional wall motion     abnormalities. Doppler parameters are consistent with abnormal     left ventricular relaxation (grade 1 diastolic dysfunction). The     global longitudinal strain was -19%. - Mitral valve: The annulus was mildly calcified. The leaflets were     mildly thickened. Leaflet separation was reduced. Mobility was     restricted. There was mild regurgitation directed posteriorly. - Left atrium: The atrium was mildly to moderately dilated. - Inferior vena cava: The vessel was normal in size; the     respirophasic diameter changes were in the normal range (>= 50%);     findings are consistent with normal central venous pressure. TTE 6/23/20:  Study Conclusions   - Left ventricle: The cavity size is normal. There is mild focal     basal hypertrophy. There is hypertrophy of the septum. Systolic     function was normal. The estimated ejection fraction was in the     range of 52% to 57%. Wall motion was normal; there were no     regional wall motion abnormalities. Features are consistent with     a pseudonormal left ventricular filling pattern, with concomitant     abnormal relaxation and increased filling pressure (grade 2     diastolic dysfunction).  The global longitudinal strain was -22%. - Aortic valve: Thickening, consistent with sclerosis. - Mitral valve: Leaflet separation was reduced. Mobility was     restricted. There was mild to moderate regurgitation.   - Left atrium: The atrium is markedly dilated. - Inferior vena cava: The vessel was normal in size; the     respirophasic diameter changes were in the normal range (&gt;= 50%);     findings are consistent with normal central venous pressure. TTE 11/3/21:  Summary   Technically difficult examination. Global left ventricular function is moderately decreased with ejection   fraction estimated at 35-40%. EF by Medina's method estimated at 35%. Regional wall motion abnormalities cannot be ruled out due to irregular   heart rate. Diastolic dysfunction grade and filing pressure are indeterminate. The left atrium is severely dilated. Right ventricular systolic function is reduced . Aortic valve appears sclerotic but opens adequately. Mitral annular calcification is present, more prominent on the posterior   mitral valve leaflet. Mild to moderate (posteriorly eccentric) mitral regurgitation. Mild pulmonic regurgitation. Moderate tricuspid valve regurgitation. Trace aortic valve regurgitation. Systolic pulmonary artery pressure (SPAP) estimated at 44 mmHg (right atrial   pressure 3 mmHg), consistent with mild pulmonary hypertension. Irregular heart rate throughout study. ROBERTO 1/17/22:  Summary   ROBERTO performed by Dr. Jignesh Judge. Left ventricular ejection fraction is visually estimated at 45%. Right ventricular systolic function is reduced. Biatrial dilatation. There is no evidence of mass or thrombus in the left atrium or appendage. A bubble study was performed and shows no evidence of a right-to-left atrial   level shunt. There is mild posterior mitral annular calcification and restricted motion   of the posterior mitral leaflet which fails to fully coapt.    Moderate mitral regurgitation with multiple jets noted. Moderate tricuspid regurgitation. Cath:  [facility] 4/6/05:  FINDINGS:   CORONARY ARTERIOGRAPHY: Coronary arteriography of this   co-dominant system revealed no significant stenoses of the LMCA,   LAD, left circumflex or RCA. LEFT VENTRICULOGRAPHY: Left ventriculogram in the RANGEL projection   in the setting of ectopy revealed normal left ventricular   function with an estimated ejection fraction of greater then 50%   and trace mitral regurgitation. IMPRESSION: No significant coronary artery disease, probably   normal left ventricular function. PLAN:   Medical therapy. We will arrange a MUGA to definitively assess LV function. CATH:   [facility] 11/5/21:  Findings:  Hemodynamics:  A. Right heart catheterization                   1. RA: 8 mmHg                   2. RV: 42/8 mmHg                   3. PA: 40/24 (30) mmHg                   4. PCWP: 17 mmHg                   5. Saturations: AO 92%, RA 59%, PA 56%                   6. Danish CO: 4.48 L/min                   7. Danish CI: 2.43 L/min*m2                   8. Danish SVR: 1,625 dyne*sec/cm5                   9. Danish PVR: 232 dyne*sec/cm5              B. Opening arterial pressure: 134/76 (99) mmHg              C. LVEDP: 15 mmHg     2. Coronary anatomy:  A. Left main artery: The left main artery bifurcates into the left anterior descending artery and left circumflex artery. The left main artery has minor luminal irregularities. .  B. Left anterior descending artery: Transapical vessel which gives rise to 2 small diagonal arteries. The LAD has a 20% ostial stenosis, eccentric 40% proximal stenosis, and 30% mid-vessel stenosis. C. Left circumflex artery: Non-dominant vessel that gives rise to 3 obtuse marginal arteries and a posterolateral branch. The LCx has a 20% ostial stenosis. The remainder of the vessel is relatively free of disease. The OM1 is a very small vessel with a high origin.   The OM2 is a large branching vessel with no angiographically significant disease. The left posterolateral branch has minor luminal irregularities. D. Right coronary artery: Dominant vessel. The RCA is medium caliber and has minor luminal irregularities. The right posterior descending artery has minor luminal irregularities. The right posterolateral branch has minor luminal irregularities. Assessment:     1. Paroxysmal atrial fibrillation - Has undergone two prior DCCV. Following her last DCCV in [place], she went back into atrial fibrillation a few hours later and was scheduled to undergo RFCA, but ultimately declined proceeding with the proceed. She later spontaneously converted to sinus rhythm and remains in sinus rhythm at this time. [id number] is 5, but patient decided to quit taking Eliquis several weeks ago. 2. Chronic biventricular systolic heart failure/non-ischemic cardiomyopathy - Suspect underlying cardiomyopathy was tachycardia-induced in setting of atrial fibrillation with RVR. Invasive coronary angiography from 11/2021 demonstrated non-obstructive disease. She currently appears clinically euvolemic and is NYHA function class I.  3. Essential hypertension - /70 today in clinic. 4. Mild pulmonary hypertension - Secondary to left-sided heart failure. 5. Non-obstructive coronary artery disease  6. Mild-moderate mitral regurgitation  7. Recurrent UTIs  8. Former tobacco use    Plan:     1. We again discussed the elevated risk of stroke due to atrial fibrillation, but the patient prefers to remain off anticoagulation at this time. 2. She previously also elected to stop taking her statin. 3. Will continue aspirin 81 mg daily, metoprolol succinate 75 mg BID, lisinopril 5 mg daily, and lasix  20 mg as needed for weight gain of 3 lbs in one day or 5 lbs over 3 days. 4. Obtain repeat TTE to re-evaluate cardiac function and monitor for progression of mitral regurgitation.   5. Continue to encourage heart healthy, low sodium diet and regular physical exercise for at least 30 minutes a minimum of 3-5 times per week. 6. Follow-up with me in one year. Scribe's attestation: This note was scribed in the presence of Oli Garcia DO by Guerda Alamo RN       It is a pleasure to assist in the care of Cathy Hernandez. Please call with any questions. The scribes documentation has been prepared under my direction and personally reviewed by me in its entirety. I confirm that the note above accurately reflects all work, treatment, procedures, and medical decision making performed by me. I, Dr. Vinnie Ravi, personally performed the services described in this documentation as scribed by Guerda Alamo RN in my presence, and it is both accurate and complete to the best of our ability.        Vinnie Ravi, [address] [facility]  [phone] [facility]  [phone] [address] [place] [address]

## 2022-08-17 ENCOUNTER — OFFICE VISIT (OUTPATIENT)
Dept: CARDIOLOGY CLINIC | Age: 82
End: 2022-08-17
Payer: COMMERCIAL

## 2022-08-17 VITALS
OXYGEN SATURATION: 100 % | WEIGHT: 151 LBS | DIASTOLIC BLOOD PRESSURE: 70 MMHG | SYSTOLIC BLOOD PRESSURE: 136 MMHG | HEART RATE: 74 BPM | HEIGHT: 67 IN | BODY MASS INDEX: 23.7 KG/M2

## 2022-08-17 DIAGNOSIS — I50.22 CHRONIC SYSTOLIC HEART FAILURE (HCC): Primary | ICD-10-CM

## 2022-08-17 DIAGNOSIS — I48.0 PAROXYSMAL ATRIAL FIBRILLATION (HCC): ICD-10-CM

## 2022-08-17 DIAGNOSIS — I10 ESSENTIAL HYPERTENSION: ICD-10-CM

## 2022-08-17 DIAGNOSIS — Z87.891 FORMER TOBACCO USE: ICD-10-CM

## 2022-08-17 DIAGNOSIS — I42.8 NON-ISCHEMIC CARDIOMYOPATHY (HCC): ICD-10-CM

## 2022-08-17 DIAGNOSIS — I27.20 PULMONARY HYPERTENSION (HCC): ICD-10-CM

## 2022-08-17 DIAGNOSIS — I34.0 MITRAL VALVE INSUFFICIENCY, UNSPECIFIED ETIOLOGY: ICD-10-CM

## 2022-08-17 PROCEDURE — 99214 OFFICE O/P EST MOD 30 MIN: CPT | Performed by: INTERNAL MEDICINE

## 2022-08-17 PROCEDURE — 1123F ACP DISCUSS/DSCN MKR DOCD: CPT | Performed by: INTERNAL MEDICINE

## 2022-08-17 PROCEDURE — 93000 ELECTROCARDIOGRAM COMPLETE: CPT | Performed by: INTERNAL MEDICINE

## 2022-08-17 NOTE — PATIENT INSTRUCTIONS
Plan:     1. Strongly recommended taking Eliquis due to risk of clot with atrial fibrillation   -also strongly recommended taking a \"statin\" medication for your cholesterol  2. Continue taking cardiac medications as prescribed  3. Encourage a heart healthy diet  -limit intake of fatty foods, sweets, red meats, etc.   4. Recommend activity as tolerated  -encourage a minimum of 30 minutes of moderate exercise 3-5 times a week  5. Recommend Echo   -you will call to schedule this   6. Follow up with me in 1 year or sooner if needed     Your provider has ordered testing for further evaluation. An order/prescription has been included in your paper work. To schedule outpatient testing, contact Central Scheduling by calling [address] [place] [address] ([phone]).

## 2022-09-15 RX ORDER — METOPROLOL SUCCINATE 50 MG/1
75 TABLET, EXTENDED RELEASE ORAL DAILY
Qty: 135 TABLET | Refills: 3 | Status: SHIPPED | OUTPATIENT
Start: 2022-09-15

## 2022-09-15 NOTE — TELEPHONE ENCOUNTER
Per [facility]-need to call patient to verify that she is taking Metoprolol and ask if she needs [facility] to refill medication or if it is handled by PCP    Left message with  to have patient give us a call regarding above questions. Thanks.

## 2022-09-15 NOTE — TELEPHONE ENCOUNTER
Pt is still taking metoprolol and [facility] is still taking care of medication. Needs refill  [place] [id number] [facility], [address] [place] [address] [address] [phone]   Pt wants to know if there is away to make it so she feels like she is not taking so much.     Take 1 tablet by mouth in the morning and at bedtime To be taken with 50 mg oral tablets also prescribed twice daily for total 75 mg twice daily

## 2022-09-15 NOTE — TELEPHONE ENCOUNTER
1 [facility] pending-updated prescription to Metoprolol Succinate 50mg tablets-patient to take 1.5 tablets BID

## 2022-10-16 DIAGNOSIS — I50.21 ACUTE SYSTOLIC HEART FAILURE (HCC): Primary | ICD-10-CM

## 2022-10-16 DIAGNOSIS — I42.9 CARDIOMYOPATHY, UNSPECIFIED TYPE (HCC): ICD-10-CM

## 2022-10-16 DIAGNOSIS — I27.20 PULMONARY HYPERTENSION (HCC): ICD-10-CM

## 2022-10-17 RX ORDER — LISINOPRIL 5 MG/1
TABLET ORAL
Qty: 180 TABLET | Refills: 3 | Status: SHIPPED | OUTPATIENT
Start: 2022-10-17

## 2022-10-18 RX ORDER — DIGOXIN 125 MCG
125 TABLET ORAL DAILY
Qty: 30 TABLET | Refills: 11 | OUTPATIENT
Start: 2022-10-18 | End: 2022-11-17

## 2023-01-10 ENCOUNTER — OFFICE VISIT (OUTPATIENT)
Dept: CARDIOLOGY CLINIC | Age: 83
End: 2023-01-10
Payer: COMMERCIAL

## 2023-01-10 VITALS
HEART RATE: 68 BPM | DIASTOLIC BLOOD PRESSURE: 84 MMHG | SYSTOLIC BLOOD PRESSURE: 132 MMHG | BODY MASS INDEX: 24.8 KG/M2 | HEIGHT: 67 IN | OXYGEN SATURATION: 100 % | WEIGHT: 158 LBS

## 2023-01-10 DIAGNOSIS — I49.3 PVC (PREMATURE VENTRICULAR CONTRACTION): Primary | ICD-10-CM

## 2023-01-10 DIAGNOSIS — I48.19 PERSISTENT ATRIAL FIBRILLATION (HCC): ICD-10-CM

## 2023-01-10 DIAGNOSIS — I42.9 CARDIOMYOPATHY, UNSPECIFIED TYPE (HCC): ICD-10-CM

## 2023-01-10 PROCEDURE — 93000 ELECTROCARDIOGRAM COMPLETE: CPT | Performed by: INTERNAL MEDICINE

## 2023-01-10 PROCEDURE — 99214 OFFICE O/P EST MOD 30 MIN: CPT | Performed by: INTERNAL MEDICINE

## 2023-01-10 PROCEDURE — 1123F ACP DISCUSS/DSCN MKR DOCD: CPT | Performed by: INTERNAL MEDICINE

## 2023-01-10 RX ORDER — METOPROLOL SUCCINATE 50 MG/1
75 TABLET, EXTENDED RELEASE ORAL DAILY
Qty: 135 TABLET | Refills: 2 | Status: SHIPPED | OUTPATIENT
Start: 2023-01-10

## 2023-01-10 RX ORDER — LOSARTAN POTASSIUM 25 MG/1
25 TABLET ORAL DAILY
Qty: 90 TABLET | Refills: 1 | Status: SHIPPED | OUTPATIENT
Start: 2023-01-10

## 2023-01-10 NOTE — PROGRESS NOTES
[facility]   Electrophysiology Consult Note            Date: 1/10/23  Patient Name: Lady Pugh  YOB: 1940    Primary Care Physician: Cyrus Gordon MD    CHIEF COMPLAINT:   Chief Complaint   Patient presents with    6 Month Follow-Up    Atrial Fibrillation    Other     PVC      HISTORY OF PRESENT ILLNESS: Lady Pugh is a 81 y.o. female with a PMH significant for AF, CHF, ICM, pulmonary HTN. Patient had echo in 2005 that demonstrated an EF of 40-45%. She underwent a cardiac cath at that time that demonstrated significant coronary disease. Her EF recovered in 2014 to 60-65%. Patient was diagnosed with AF in 10/2021. She was admitted in 11/2021 with UTI and sepsis and was found to be in AF RVR at this time. Her echo on 11/3/2021 demonstrated 35-40%. Patient underwent DCCV on 1/17/2022. Patient was recently in [place] and went in to AF and required a CV there. On 3/1/2022, ECG demonstrates AF (86). She states that she is doing well. She is fatigued when she is in AF. She feels like her energy levels are lower than they used to be. Patient was admitted for Tikosyn loading on 3/14/2022. Patient underwent DCCV on 3/14/2022. On 4/5/2022, ECG demonstrates AF (100). She states that she feels ok. She felt better and had a lot more energy when she was in normal rhythm. An RFCA of AF was discussed and planned, but was cancelled due to patient preference. She also stopped taking her Eliquis due to her preference. Today, 1/10/2023, ECG demonstrates SR 68 BPM. She reports that she is doing well. She has not had any breakthrough AF that she is aware of. She is not taking her blood thinner. Patient denies current edema, chest pain, sob, palpitations, dizziness or syncope. Past Medical History:   has a past medical history of Atrial fibrillation ([address] Cardiomyopathy ([address] COVID-19, Hypertension, Thyroid disease, and Urethral stricture.     Past Surgical History:   has a past surgical history that includes Hemorrhoid surgery; Tonsillectomy; Knee arthroscopy (Right, 03/10/2016); Cardioversion (03/16/2022); and Hysterectomy. Allergies:  Shellfish-derived products    Social History:   reports that she quit smoking about 42 years ago. Her smoking use included cigarettes. She has never used smokeless tobacco. She reports current alcohol use of about 1.0 standard drink per week. She reports that she does not use drugs. Family History: family history is not on file. Home Medications:    Prior to Admission medications    Medication Sig Start Date End Date Taking? Authorizing Provider   metoprolol succinate (TOPROL XL) 50 MG extended release tablet Take 1.5 tablets by mouth daily 9/15/22  Yes Oli Garcia DO   aspirin 81 MG chewable tablet Take 1 tablet by mouth daily 12/18/21 1/10/23 Yes Oli Garcia DO   Levothyroxine Sodium 100 MCG CAPS Take 112 mcg by mouth Daily   Yes Historical Provider, MD   lisinopril (PRINIVIL;ZESTRIL) 5 MG tablet TAKE 1 TABLET BY MOUTH TWICE DAILY  Patient not taking: Reported on 1/10/2023 10/17/22   Oli Garcia DO   furosemide (LASIX) 20 MG tablet TAKE 1 TABLET BY MOUTH DAILY  Patient taking differently: Take 20 mg by mouth daily as needed  12/13/21 4/5/22  Oli Garcia DO       REVIEW OF SYSTEMS:    All 14-point review of systems are completed and  pertinent positives are mentioned in the history of present illness. Other  systems are reviewed and are negative. Physical Examination:    /84   Pulse 68   Ht 5' 7\" (1.702 m)   Wt 158 lb (71.7 kg)   SpO2 100%   BMI 24.75 kg/m²      Constitutional and General Appearance:    alert, cooperative, no distress and appears stated age  [de-identified]:    PERRLA, no cervical lymphadenopathy. No masses palpable. Normal oral  mucosa  Respiratory:  Normal excursion and expansion without use of accessory muscles  Resp Auscultation: Normal breath sounds without dullness or wheezing  Cardiovascular:   The apical impulse is not displaced  RRR S1S2 w/o M/G/R  Abdomen:  No masses or tenderness  Bowel sounds present  Extremities:   No Cyanosis or Clubbing   Lower extremity edema: No  Skin: Warm and dry  Neurological:  Alert and oriented. Moves all extremities well  No abnormalities of mood, affect, memory, mentation, or behavior are noted    DATA:    ECG 1/10/23: Personally reviewed. ROBERTO 1/17/2022   Summary   ROBERTO performed by Dr. Hayder Raymundo. Left ventricular ejection fraction is visually estimated at 45%. Right ventricular systolic function is reduced. Biatrial dilatation. There is no evidence of mass or thrombus in the left atrium or appendage. A bubble study was performed and shows no evidence of a right-to-left atrial   level shunt. There is mild posterior mitral annular calcification and restricted motion   of the posterior mitral leaflet which fails to fully coapt. Moderate mitral regurgitation with multiple jets noted. Moderate tricuspid regurgitation. Cardiac cath 10/30/2021  Impression:  1. Non-obstructive coronary artery disease. 2. Non-ischemic cardiomyopathy. 3. Acute biventricular systolic heart failure with mildly elevated left and right-sided cardiac filling pressures. 4. Mild pulmonary hypertension. 5. Preserved Danish cardiac output and index. 6. Persistent atrial fibrillation. IMPRESSION:    1. Persistent symptomatic atrial fibrillation ([id number] score 3)  03/01/2022  Patient is a pleasant 72-year-old female with a medical history significant for hypothyroidism and depression who presents from home to [facility]. Patient has had paroxysmal atrial fibrillation and has been symptomatic with palpitations, fatigue, shortness of breath. She is undergone 2 cardioversions and now presents in atrial fibrillation. We discussed anticoagulation (NOAC and warfarin), rate control, rhythm control, AVN + pacemaker, and atrial fibrillation ablation.   We reviewed risks and benefits of each approach. We discussed side effects of class IC, class II, class III, and class IV antiarrhythmics. All questions were answered. Patient would like to start with dofetilide and so we will schedule an induction. All questions concerns addressed. - Schedule dofetilide admission.  - Continue apixaban 5 mg BID.  - Continue metoprolol 50 mg daily. 4/5/2022  Patient presents from home for follow up having recently been admitted for dofetilide. She is back in atrial fibrillation and is symptomatic. We discussed amiodarone vs ablation. Patient would like to move forward with ablation and so we will schedule. - Continue dofetilide.  - Continue metoprolol.  - Continue apixaban 5 mg BID. - Schedule ablation. 1/10/2023  Patient presents for follow up. She has been doing well from rhythm standpoint. No known recurrent atrial fibrillation of long standing. She has not been on on [employer] however she is willing to restart given her elevated [id number] score. - Continue metoprolol.  - Restart apixaban. - Restart losartan given potential allergic reaction to lisinopril with nasal drip. - Follow up in 6 months or PRN. Of note, she is under more stress and pressure as her  has begun to decline. Of note, I follow her , Adam Colon. RECOMMENDATIONS:  Stop lisinopril. Start losartan 25 mg daily. Strongly recommend blood thinner. Restart Eliquis 5 mg twice daily. Can consider Watchman procedure as alternative. Consider Yasmani Mchugh, Apple Watch, or implantable loop monitor for monitoring. Follow up in 6 months with [facility]. QUALITY MEASURES  1. Tobacco Cessation Counseling: NA  2. Retake of BP if >140/90:   NA  3. Documentation to PCP/referring for new patient:  Sent to PCP at close of office visit  4. CAD patient on anti-platelet: NA  5. CAD patient on STATIN therapy:  NA  6.  Patient with CHF and aFib on anticoagulation:  Yes     All questions and concerns were addressed to the patient/family. Alternatives to my treatment were discussed. Dr. Asher Martinez MD  Electrophysiology  [facility]. [address]. [place]  Phone: [phone]  Fax: [phone]     NOTE: This report was transcribed using voice recognition software. Every effort was made to ensure accuracy, however, inadvertent computerized transcription errors may be present. Amanda Miller RN, am scribing for and in the presence of Dr. Nagi Adames. 01/10/23 1:24 PM   Richard Moralez RN    I reviewed with the resident the medical history and the resident's findings on the physical examination. I discussed with the resident the patient's diagnosis and concur with the plan.

## 2023-01-10 NOTE — PATIENT INSTRUCTIONS
RECOMMENDATIONS:  Stop lisinopril. Start losartan 25 mg daily. Strongly recommend blood thinner. Restart Eliquis 5 mg twice daily. Can consider Watchman procedure as alternative. Consider Yasmani Mchugh, Apple Watch, or implantable loop monitor for monitoring. Follow up in 6 months with [facility].

## 2023-01-14 DIAGNOSIS — I50.21 ACUTE SYSTOLIC HEART FAILURE (HCC): Primary | ICD-10-CM

## 2023-01-16 RX ORDER — FUROSEMIDE 20 MG/1
20 TABLET ORAL PRN
Qty: 30 TABLET | Refills: 3 | OUTPATIENT
Start: 2023-01-16

## 2023-01-16 NOTE — TELEPHONE ENCOUNTER
Called and spoke to patient she states she is not taking furosemide (Lasix) and does not need filled.

## 2023-01-24 ENCOUNTER — TELEPHONE (OUTPATIENT)
Dept: CARDIOLOGY CLINIC | Age: 83
End: 2023-01-24

## 2023-01-24 RX ORDER — PANTOPRAZOLE SODIUM 40 MG/1
40 TABLET, DELAYED RELEASE ORAL DAILY
COMMUNITY

## 2023-01-24 NOTE — TELEPHONE ENCOUNTER
I'm ok with those doses. Can we get a nurse visit for blood pressure check if she's in town else she can check at home and let me know what numbers are. Thanks.

## 2023-01-24 NOTE — TELEPHONE ENCOUNTER
Patient called in to update medication list.   I had updated list accordingly. Most notably patient reports she has been taking Losartan 50mg once day and Toprol XL 75mg twice a day. This is different than the medication list per [facility] [address] [place] [address] 1/10/2023. Please advise on correct dosing of medication that patient should be on.

## 2023-01-24 NOTE — TELEPHONE ENCOUNTER
Pt called requesting returning call from medical staff regarding medication clarification, stated she is a little confused on dosage

## 2023-01-25 NOTE — TELEPHONE ENCOUNTER
Noted. LVM for patient, just need to relay [phone] [facility] message.  Thanks

## 2023-01-25 NOTE — TELEPHONE ENCOUNTER
Called and spoke with patient-she checks her blood pressure at home regularly. She reports her blood pressure has been well controlled at home. Patient hasn't taken her BP yet today 1/25/2023 and hasn't taken her medications yet this morning.    1/24/2023--/72    HR 76  1/23/2023--/80    HR 70  1/22/2023-- (this was before medication) /72   HR 68

## 2023-01-31 NOTE — PLAN OF CARE
Increase patients ADLs/functional status to baseline.
Nutrition Problem #1: Increased nutrient needs  Intervention: Food and/or Nutrient Delivery: Start Oral Nutrition Supplement, Continue Current Diet  Nutritional Goals: Patient will eat 50% or greater of meals and supplements.
Problem: Falls - Risk of:  Goal: Will remain free from falls  Description: Will remain free from falls  Outcome: Ongoing     Problem: Skin Integrity:  Goal: Will show no infection signs and symptoms  Description: Will show no infection signs and symptoms  Outcome: Ongoing     Problem: Nutrition  Goal: Optimal nutrition therapy  Outcome: Ongoing
Problem: Falls - Risk of:  Goal: Will remain free from falls  Description: Will remain free from falls  Outcome: Ongoing  Goal: Absence of physical injury  Description: Absence of physical injury  Outcome: Ongoing     Problem: Skin Integrity:  Goal: Will show no infection signs and symptoms  Description: Will show no infection signs and symptoms  Outcome: Ongoing  Goal: Absence of new skin breakdown  Description: Absence of new skin breakdown  Outcome: Ongoing
Problem: Falls - Risk of:  Goal: Will remain free from falls  Description: Will remain free from falls  Outcome: Ongoing  Goal: Absence of physical injury  Description: Absence of physical injury  Outcome: Ongoing     Problem: Skin Integrity:  Goal: Will show no infection signs and symptoms  Description: Will show no infection signs and symptoms  Outcome: Ongoing  Goal: Absence of new skin breakdown  Description: Absence of new skin breakdown  Outcome: Ongoing     Problem: Pain:  Goal: Pain level will decrease  Description: Pain level will decrease  Outcome: Ongoing  Goal: Control of acute pain  Description: Control of acute pain  Outcome: Ongoing  Goal: Control of chronic pain  Description: Control of chronic pain  Outcome: Ongoing     Problem: Nutrition  Goal: Optimal nutrition therapy  11/5/2021 1821 by Emily Kearney RN  Outcome: Ongoing  11/5/2021 1553 by Mai Crain RD, LD  Outcome: Ongoing
Show Additional Area 5: Yes
Periorbital Skin Units: 0
Dilution (U/ 0.1cc): 10
Show Lcl Units: No
Detail Level: Detailed
Show Inventory Tab: Show
Post-Care Instructions: Patient instructed to not lie down for 4 hours and limit physical activity for 24 hours.
Consent: Written consent obtained. Risks include but not limited to lid/brow ptosis, bruising, swelling, diplopia, temporary effect, incomplete chemical denervation.

## 2023-03-20 DIAGNOSIS — I48.19 PERSISTENT ATRIAL FIBRILLATION (HCC): ICD-10-CM

## 2023-03-21 DIAGNOSIS — I48.19 PERSISTENT ATRIAL FIBRILLATION (HCC): ICD-10-CM

## 2023-03-21 RX ORDER — APIXABAN 5 MG/1
TABLET, FILM COATED ORAL
Qty: 60 TABLET | Refills: 0 | OUTPATIENT
Start: 2023-03-21

## 2023-03-21 RX ORDER — APIXABAN 5 MG/1
TABLET, FILM COATED ORAL
Qty: 60 TABLET | Refills: 6 | Status: SHIPPED | OUTPATIENT
Start: 2023-03-21

## 2023-04-19 RX ORDER — METOPROLOL SUCCINATE 25 MG/1
TABLET, EXTENDED RELEASE ORAL
Qty: 180 TABLET | Refills: 0 | Status: SHIPPED | OUTPATIENT
Start: 2023-04-19 | End: 2023-04-19

## 2023-05-17 ENCOUNTER — HOSPITAL ENCOUNTER (OUTPATIENT)
Dept: WOMENS IMAGING | Age: 83
Discharge: HOME | End: 2023-05-17
Payer: COMMERCIAL

## 2023-05-17 DIAGNOSIS — Z12.31 ENCOUNTER FOR SCREENING MAMMOGRAM FOR BREAST CANCER: ICD-10-CM

## 2023-05-17 PROCEDURE — 77063 BREAST TOMOSYNTHESIS BI: CPT

## 2023-06-19 ENCOUNTER — TELEPHONE (OUTPATIENT)
Dept: CARDIOLOGY CLINIC | Age: 83
End: 2023-06-19

## 2023-06-19 NOTE — TELEPHONE ENCOUNTER
----- Message from Elsi Monahan DO sent at 6/17/2023  9:53 AM EDT -----  Please let patient know that LVEF has improved and is now in normal range. Mitral regurgitation appeared stable to improved. Patient is acceptable cardiac risk for proceeding with her planned right total knee replacement. No additional cardiac testing is indicated prior to proceeding with her procedure. Thanks.

## 2023-06-21 NOTE — TELEPHONE ENCOUNTER
Attempted to reach pt, left vm to call the office back.  Sending letter to pt home and closing encounter

## 2023-09-28 ENCOUNTER — TELEPHONE (OUTPATIENT)
Dept: CARDIOLOGY CLINIC | Age: 83
End: 2023-09-28

## 2023-09-28 NOTE — TELEPHONE ENCOUNTER
Called patient. She stated her BP yesterday was 160/80. She retook BP whilst on the line and repeat was 153/102 pulse 89 bpm. Advised patient to monitor her BP 2-3 times daily for the next 2 weeks and keep a BP log. She states her heart rate has been around 100 bpm and at times she feels like she is in AF. Do you want patient to come in for EKG tomorrow? Or 2 week Monitor placement for AF? Please advise.  Thanks

## 2023-09-28 NOTE — TELEPHONE ENCOUNTER
PT called and stated she is experiencing fast heart rate, and pt thinks she is in Afib. PT stated BP Systolic was 984 66/94/72. PT does not remember diastolic number. PT stated her HR was over 100 yesterday. PT stated she went to urgent care and dx her w UTI and prescribed her Cefdinir. PT also stated PCP prescribed her Diazepam 2 days ago. PT schedule ov 11/01/23. PT wants to be seen sooner if possible. PT stated she will go to ER if symptoms get worse.

## 2023-09-29 NOTE — TELEPHONE ENCOUNTER
Agree with having patient keep BP log and would like for her to call back with her readings in one week. Please have her come in for an EKG.

## 2023-10-02 NOTE — TELEPHONE ENCOUNTER
Attempted to call patient to relay result of EKG she had earlier in PCP office. No answer. Voice message left to call back office for result message. Problem: Falls - Risk of:  Goal: Will remain free from falls  Description: Will remain free from falls  10/30/2021 0533 by Ivette Velazquez RN  Outcome: Ongoing  Note: Pt assessed as a fall risk this shift. Remains free from falls and accidental injury at this time. Fall precautions in place, including falling star sign and fall risk band on pt. Floor free from obstacles, and bed is locked and in lowest position. Adequate lighting provided. Pt encouraged to call before getting OOB for any need. Bed alarm activated. Will continue to monitor needs during hourly rounding, and reinforce education on use of call light. Problem: Skin Integrity:  Goal: Will show no infection signs and symptoms  Description: Will show no infection signs and symptoms  10/30/2021 0533 by Ivette Velazquez RN  Outcome: Ongoing  Note: Skin assessment complete. Waffle mattress in place. Pt turned and repositioned every two hours with assistance from staff. Area kept free from moisture. Proper nourishment and fluids encouraged, as appropriate. Skin remains clean, dry, and intact. Will continue to monitor for additional needs and changes in skin breakdown. Problem: SAFETY  Goal: Free from intentional harm  10/30/2021 0533 by Ivette Velazquez RN  Outcome: Ongoing     Problem: DAILY CARE  Goal: Daily care needs are met  10/30/2021 0533 by Ivette Velazquez RN  Outcome: Ongoing  Note: Patient and staff currently meeting all patient's daily care needs. Patient encouraged to participate and complete all ADLs per self. Will continue to monitor for opportunities for patient to meet all ADLs per self.

## 2023-10-02 NOTE — TELEPHONE ENCOUNTER
Patient had EKG obtained in PCP office today. EKG scanned into Epic under media tab. Please review and advise.  Thanks

## 2023-10-02 NOTE — TELEPHONE ENCOUNTER
Called and spoke with patient. Relayed Dr Montoya Mediate message. Patient was currently at her PCP office when I called her. She stated she is going to obtain EKG at PCP office. She will call back on Thursday with her PCP log.

## 2023-10-03 NOTE — TELEPHONE ENCOUNTER
Called and spoke with patient. Relayed message from Dr Lakesha Castillo (covering for [facility]). Patient verbally understood.

## 2023-10-06 DIAGNOSIS — I49.3 PVC (PREMATURE VENTRICULAR CONTRACTION): ICD-10-CM

## 2023-10-06 DIAGNOSIS — I48.19 PERSISTENT ATRIAL FIBRILLATION (HCC): ICD-10-CM

## 2023-10-06 RX ORDER — LOSARTAN POTASSIUM 25 MG/1
25 TABLET ORAL DAILY
Qty: 90 TABLET | Refills: 1 | OUTPATIENT
Start: 2023-10-06

## 2023-10-09 DIAGNOSIS — I48.19 PERSISTENT ATRIAL FIBRILLATION (HCC): ICD-10-CM

## 2023-10-09 RX ORDER — APIXABAN 5 MG/1
TABLET, FILM COATED ORAL
Qty: 180 TABLET | Refills: 0 | Status: SHIPPED | OUTPATIENT
Start: 2023-10-09

## 2023-10-30 ASSESSMENT — ENCOUNTER SYMPTOMS
PHOTOPHOBIA: 0
SORE THROAT: 0
VOMITING: 0
NAUSEA: 0
CONSTIPATION: 0
RHINORRHEA: 0
ABDOMINAL PAIN: 0
COUGH: 0
EYE PAIN: 0
BLOOD IN STOOL: 0

## 2023-10-30 NOTE — PROGRESS NOTES
by me.  I, Dr. Lima Speaker, personally performed the services described in this documentation as scribed by María Puri RN in my presence, and it is both accurate and complete to the best of our ability.        Janie Ladd, [address] [place] [address]  [phone] [facility]  [phone] [facility]

## 2023-11-01 ENCOUNTER — OFFICE VISIT (OUTPATIENT)
Dept: CARDIOLOGY CLINIC | Age: 83
End: 2023-11-01

## 2023-11-01 VITALS
DIASTOLIC BLOOD PRESSURE: 86 MMHG | SYSTOLIC BLOOD PRESSURE: 128 MMHG | WEIGHT: 158 LBS | OXYGEN SATURATION: 98 % | BODY MASS INDEX: 24.8 KG/M2 | HEIGHT: 67 IN | HEART RATE: 102 BPM

## 2023-11-01 DIAGNOSIS — I44.7 LBBB (LEFT BUNDLE BRANCH BLOCK): ICD-10-CM

## 2023-11-01 DIAGNOSIS — I10 ESSENTIAL HYPERTENSION: ICD-10-CM

## 2023-11-01 DIAGNOSIS — I42.8 NON-ISCHEMIC CARDIOMYOPATHY (HCC): Primary | ICD-10-CM

## 2023-11-01 DIAGNOSIS — I48.0 PAROXYSMAL ATRIAL FIBRILLATION (HCC): ICD-10-CM

## 2023-11-01 DIAGNOSIS — Z87.891 FORMER TOBACCO USE: ICD-10-CM

## 2023-11-01 DIAGNOSIS — I47.29 NSVT (NONSUSTAINED VENTRICULAR TACHYCARDIA) (HCC): ICD-10-CM

## 2023-11-01 DIAGNOSIS — I27.20 PULMONARY HYPERTENSION (HCC): ICD-10-CM

## 2023-11-01 PROBLEM — R00.2 PALPITATIONS: Status: ACTIVE | Noted: 2023-11-01

## 2023-11-01 RX ORDER — METOPROLOL SUCCINATE 100 MG/1
100 TABLET, EXTENDED RELEASE ORAL 2 TIMES DAILY
Qty: 180 TABLET | Refills: 3 | Status: SHIPPED | OUTPATIENT
Start: 2023-11-01

## 2023-11-01 RX ORDER — TIZANIDINE 2 MG/1
2 TABLET ORAL AS NEEDED
COMMUNITY
Start: 2023-10-20

## 2023-11-01 NOTE — PATIENT INSTRUCTIONS
1. Increase metoprolol succinate (toprol xl) 100 mg twice daily for heart rate control   2. Will continue aspirin 81 mg daily, eliquis 5 mg BID, metoprolol succinate 100 mg BID, losartan 50 mg daily, and lasix 20 mg as needed for weight gain of 3 lbs in one day or 5 lbs over 3 days. 3. She also previously elected to stop taking her statin. 4. Continue to encourage heart healthy, low sodium diet and regular physical exercise for at least 30 minutes a minimum of 3-5 times per week  5. Discussed treatment options for Afib. Follow up with EP team to further discuss   6. Monitor your heart rate at home. If it is consistently over 120 let me know   7. Follow up with EP ASAP to discuss ablation   8.  Follow up with me in 6 months

## 2023-11-05 ASSESSMENT — ENCOUNTER SYMPTOMS
DIARRHEA: 1
SHORTNESS OF BREATH: 1

## 2023-11-06 ENCOUNTER — TELEPHONE (OUTPATIENT)
Dept: CARDIOLOGY CLINIC | Age: 83
End: 2023-11-06

## 2023-11-06 NOTE — PROGRESS NOTES
[address] [facility] [address]   Electrophysiology Consult Note            Date: 11/7/23  Patient Name: Carolyn Washburn  YOB: 1940    Primary Care Physician: Neil Lazaro MD    CHIEF COMPLAINT:   Chief Complaint   Patient presents with    Follow-up    Atrial Fibrillation    Palpitations    Shortness of Breath    Other     pvc     HISTORY OF PRESENT ILLNESS: Carolyn Washburn is a 81 y.o. female with a PMH significant for AF, CHF, ICM, pulmonary HTN. Patient had echo in 2005 that demonstrated an EF of 40-45%. She underwent a cardiac cath at that time that demonstrated significant coronary disease. Her EF recovered in 2014 to 60-65%. Patient was diagnosed with AF in 10/2021. She was admitted in 11/2021 with UTI and sepsis and was found to be in AF RVR at this time. Her echo on 11/3/2021 demonstrated 35-40%. Patient underwent DCCV on 1/17/2022. Patient was recently in [place] and went in to AF and required a CV there. On 3/1/2022, ECG demonstrates AF (86). She states that she is doing well. She is fatigued when she is in AF. She feels like her energy levels are lower than they used to be. Patient was admitted for Tikosyn loading on 3/14/2022. Patient underwent DCCV on 3/14/2022. On 4/5/2022, ECG demonstrates AF (100). She states that she feels ok. She felt better and had a lot more energy when she was in normal rhythm. An RFCA of AF was discussed and planned, but was cancelled due to patient preference. She also stopped taking her Eliquis due to her preference. On 1/10/2023, ECG demonstrates SR 68 BPM. She reports that she is doing well. She has not had any breakthrough AF that she is aware of. She is not taking her blood thinner. Interval History since last office visit: At cardiology office visit 11/1/2023 Toprol was increased to 100mg BID for attempted rate control with AF. Today, 11/7/2023, ECG demonstrates AF (105). Patient reports she hasn't been feeling like herself lately.  She

## 2023-11-06 NOTE — TELEPHONE ENCOUNTER
Pt called requesting an ov with agk per [facility] to discuss options for afib. Pt stated that it was suggested that she may have to get an ablation. Please advise.

## 2023-11-07 ENCOUNTER — OFFICE VISIT (OUTPATIENT)
Dept: CARDIOLOGY CLINIC | Age: 83
End: 2023-11-07
Payer: COMMERCIAL

## 2023-11-07 ENCOUNTER — TELEPHONE (OUTPATIENT)
Dept: CARDIOLOGY CLINIC | Age: 83
End: 2023-11-07

## 2023-11-07 VITALS
BODY MASS INDEX: 24.67 KG/M2 | HEART RATE: 105 BPM | OXYGEN SATURATION: 96 % | DIASTOLIC BLOOD PRESSURE: 72 MMHG | WEIGHT: 157.2 LBS | HEIGHT: 67 IN | SYSTOLIC BLOOD PRESSURE: 112 MMHG

## 2023-11-07 DIAGNOSIS — I48.91 RAPID ATRIAL FIBRILLATION (HCC): Primary | ICD-10-CM

## 2023-11-07 DIAGNOSIS — R00.2 PALPITATIONS: ICD-10-CM

## 2023-11-07 DIAGNOSIS — I49.3 PVC (PREMATURE VENTRICULAR CONTRACTION): ICD-10-CM

## 2023-11-07 PROCEDURE — 93000 ELECTROCARDIOGRAM COMPLETE: CPT | Performed by: INTERNAL MEDICINE

## 2023-11-07 PROCEDURE — 99214 OFFICE O/P EST MOD 30 MIN: CPT | Performed by: INTERNAL MEDICINE

## 2023-11-07 PROCEDURE — 1123F ACP DISCUSS/DSCN MKR DOCD: CPT | Performed by: INTERNAL MEDICINE

## 2023-11-07 NOTE — PATIENT INSTRUCTIONS
RECOMMENDATIONS:  Discussed AF options:  -cardioversion  -catheter ablation   Patient is agreeable to AF ablation and would like to proceed with this option  Continue Eliquis and Metoprolol as prescribed  Consider Lexapro or Trazodone  -chat with your PCP about these   Follow up after procedure

## 2023-11-07 NOTE — TELEPHONE ENCOUNTER
[facility]  EP Procedure Sheet    11/07/23  Yaneli Ellison  1940  [id number]  EP Procedures  [] Pacemaker implant (single/dual) [] EP Study   [] ICD implant (single/dual) [] Atrial flutter ablation (ROBERTO)   [] Biv implant ICD [] Tilt Table   [] Biv implant PPM [x] Atrial fibrillation ablation (ROBERTO)   [] Generator Change (PPM/ICD/BiV) [] SVT ablation   [] Lead revision (RV/LA/RA) (<1 month) [] PVC ablation     [] Lead extraction +/- upgrade (BiV/PPM/ICD) [] VT Ischemic/ non-ischemic   [] Loop implant/ removal [] VT RVOT   [] Cardioversion [] VT Left sided   [x] ROBERTO [] AVN ablation   Equipment  [] Medtronic  [] LUIS ALBERTO Mapping System    [] Precision [] Ensite X   [] [employer] [x] Carto Mapping System   [] [employer] [] CryoAblation   [] Biotronik [] Laser Lead Extraction   EP Procedures Scheduling Request  # hours Requested  []1 []2 [x]2-4 [] 4-6 Scheduled  Date:   Specific Day  Completed    Anesthesia [x]yes []no F/u Date:   CT surgery backup []yes [x]no Location []FF[]AND   Overnight stay   [][place]   Tyron DRAKE []RMM []MXA []MW  []UL []CMV  []WW [] RWH   []KA [] JMB [x] AJK  First vs repeat   []1st [] 2nd [] 3rd   Pre-Procedure Labs / Imaging  [] PT/INR [x] Type & cross   [x] CBC [x] Units PRBC 2   [x] BMP/Mg [] Units FFP   [] Venogram [] Cardiac CTA for Pulmonary vein mapping     RN INITIALS: EJW    Patient Instructions  Dx:  ICD-10 code:   Medication Instructions: Hold []Xarelto [x]Eliquis []Coumadin []pradaxa for   Do not eat or drink after midnight the night prior to procedure [x] Yes [] No    PLEASE SCHEDULE IN THE AM PER [facility] REQUEST  +WILL BE STARTING DOFETILIDE ALSO-PATIENT IS AWARE    MEDS TO HOLD:  -eliquis and furosemide the morning of the procedure

## 2023-11-09 NOTE — TELEPHONE ENCOUNTER
Procedure:  ROBERTO/AF Abl/Dofetilide admit  Doctor:  Dr. Morales  Date:  12/11/23  Time:  7:30am  Arrival:  6:30am  Reps:  Tommy  Anesthesia:  Yes      Spoke with patient. Please have patient arrive to the main entrance of [facility] ([address] [place], [place] [id number]) and check in with the registration desk.  They will be directed to the Cath Lab.  Please call patient regarding medication instructions. Remind patient to be NPO after midnight (8 hours prior). Do not apply lotions/creams on skin the day of procedure.

## 2023-12-04 NOTE — TELEPHONE ENCOUNTER
Spoke with the patient and relayed procedure and medication instructions. She V/U.     Hold furosemide and eliquis the am of the procedure

## 2023-12-11 ENCOUNTER — ANESTHESIA (OUTPATIENT)
Dept: CARDIAC CATH/INVASIVE PROCEDURES | Age: 83
DRG: 274 | End: 2023-12-11
Payer: MEDICARE

## 2023-12-11 ENCOUNTER — HOSPITAL ENCOUNTER (INPATIENT)
Dept: CARDIAC CATH/INVASIVE PROCEDURES | Age: 83
LOS: 3 days | Discharge: HOME | DRG: 274 | End: 2023-12-14
Attending: INTERNAL MEDICINE | Admitting: INTERNAL MEDICINE
Payer: MEDICARE

## 2023-12-11 ENCOUNTER — ANESTHESIA EVENT (OUTPATIENT)
Dept: CARDIAC CATH/INVASIVE PROCEDURES | Age: 83
DRG: 274 | End: 2023-12-11
Payer: MEDICARE

## 2023-12-11 PROBLEM — I48.91 A-FIB (HCC): Status: ACTIVE | Noted: 2023-12-11

## 2023-12-11 LAB
ANION GAP SERPL CALCULATED.3IONS-SCNC: 10 MMOL/L (ref 3–16)
ANION GAP SERPL CALCULATED.3IONS-SCNC: 11 MMOL/L (ref 3–16)
BUN SERPL-MCNC: 15 MG/DL (ref 7–20)
BUN SERPL-MCNC: 18 MG/DL (ref 7–20)
CALCIUM SERPL-MCNC: 8.3 MG/DL (ref 8.3–10.6)
CALCIUM SERPL-MCNC: 9.5 MG/DL (ref 8.3–10.6)
CHLORIDE SERPL-SCNC: 104 MMOL/L (ref 99–110)
CHLORIDE SERPL-SCNC: 98 MMOL/L (ref 99–110)
CO2 SERPL-SCNC: 21 MMOL/L (ref 21–32)
CO2 SERPL-SCNC: 24 MMOL/L (ref 21–32)
CREAT SERPL-MCNC: 0.8 MG/DL (ref 0.6–1.2)
CREAT SERPL-MCNC: 1 MG/DL (ref 0.6–1.2)
DEPRECATED RDW RBC AUTO: 14.4 % (ref 12.4–15.4)
EKG ATRIAL RATE: 88 BPM
EKG DIAGNOSIS: NORMAL
EKG Q-T INTERVAL: 364 MS
EKG QRS DURATION: 88 MS
EKG QTC CALCULATION (BAZETT): 469 MS
EKG R AXIS: 59 DEGREES
EKG T AXIS: 9 DEGREES
EKG VENTRICULAR RATE: 100 BPM
GFR SERPLBLD CREATININE-BSD FMLA CKD-EPI: 56 ML/MIN/{1.73_M2}
GFR SERPLBLD CREATININE-BSD FMLA CKD-EPI: >60 ML/MIN/{1.73_M2}
GLUCOSE SERPL-MCNC: 127 MG/DL (ref 70–99)
GLUCOSE SERPL-MCNC: 185 MG/DL (ref 70–99)
HCT VFR BLD AUTO: 43.4 % (ref 36–48)
HGB BLD-MCNC: 14.4 G/DL (ref 12–16)
MAGNESIUM SERPL-MCNC: 1.9 MG/DL (ref 1.8–2.4)
MCH RBC QN AUTO: 29.9 PG (ref 26–34)
MCHC RBC AUTO-ENTMCNC: 33.2 G/DL (ref 31–36)
MCV RBC AUTO: 89.9 FL (ref 80–100)
PLATELET # BLD AUTO: 293 K/UL (ref 135–450)
PMV BLD AUTO: 7.3 FL (ref 5–10.5)
POTASSIUM SERPL-SCNC: 3.7 MMOL/L (ref 3.5–5.1)
POTASSIUM SERPL-SCNC: 3.9 MMOL/L (ref 3.5–5.1)
RBC # BLD AUTO: 4.83 M/UL (ref 4–5.2)
SODIUM SERPL-SCNC: 133 MMOL/L (ref 136–145)
SODIUM SERPL-SCNC: 135 MMOL/L (ref 136–145)
SPECIMEN STATUS: NORMAL
WBC # BLD AUTO: 9.2 K/UL (ref 4–11)

## 2023-12-11 PROCEDURE — C1732 CATH, EP, DIAG/ABL, 3D/VECT: HCPCS | Performed by: INTERNAL MEDICINE

## 2023-12-11 PROCEDURE — 93005 ELECTROCARDIOGRAM TRACING: CPT | Performed by: INTERNAL MEDICINE

## 2023-12-11 PROCEDURE — 36415 COLL VENOUS BLD VENIPUNCTURE: CPT

## 2023-12-11 PROCEDURE — 2580000003 HC RX 258: Performed by: INTERNAL MEDICINE

## 2023-12-11 PROCEDURE — B24BZZ4 ULTRASONOGRAPHY OF HEART WITH AORTA, TRANSESOPHAGEAL: ICD-10-PCS

## 2023-12-11 PROCEDURE — 3700000001 HC ADD 15 MINUTES (ANESTHESIA): Performed by: ANESTHESIOLOGY

## 2023-12-11 PROCEDURE — 85027 COMPLETE CBC AUTOMATED: CPT

## 2023-12-11 PROCEDURE — C1760 CLOSURE DEV, VASC: HCPCS | Performed by: INTERNAL MEDICINE

## 2023-12-11 PROCEDURE — 2500000003 HC RX 250 WO HCPCS: Performed by: NURSE ANESTHETIST, CERTIFIED REGISTERED

## 2023-12-11 PROCEDURE — 6360000002 HC RX W HCPCS: Performed by: NURSE ANESTHETIST, CERTIFIED REGISTERED

## 2023-12-11 PROCEDURE — 93010 ELECTROCARDIOGRAM REPORT: CPT | Performed by: INTERNAL MEDICINE

## 2023-12-11 PROCEDURE — 80048 BASIC METABOLIC PNL TOTAL CA: CPT

## 2023-12-11 PROCEDURE — 6370000000 HC RX 637 (ALT 250 FOR IP): Performed by: INTERNAL MEDICINE

## 2023-12-11 PROCEDURE — 93656 COMPRE EP EVAL ABLTJ ATR FIB: CPT | Performed by: INTERNAL MEDICINE

## 2023-12-11 PROCEDURE — 93623 PRGRMD STIMJ&PACG IV RX NFS: CPT | Performed by: INTERNAL MEDICINE

## 2023-12-11 PROCEDURE — 93655 ICAR CATH ABLTJ DSCRT ARRHYT: CPT | Performed by: INTERNAL MEDICINE

## 2023-12-11 PROCEDURE — 83735 ASSAY OF MAGNESIUM: CPT

## 2023-12-11 PROCEDURE — C1894 INTRO/SHEATH, NON-LASER: HCPCS | Performed by: INTERNAL MEDICINE

## 2023-12-11 PROCEDURE — 02K83ZZ MAP CONDUCTION MECHANISM, PERCUTANEOUS APPROACH: ICD-10-PCS

## 2023-12-11 PROCEDURE — 3700000000 HC ANESTHESIA ATTENDED CARE: Performed by: ANESTHESIOLOGY

## 2023-12-11 PROCEDURE — 6360000002 HC RX W HCPCS

## 2023-12-11 PROCEDURE — 7100000000 HC PACU RECOVERY - FIRST 15 MIN: Performed by: ANESTHESIOLOGY

## 2023-12-11 PROCEDURE — 93657 TX L/R ATRIAL FIB ADDL: CPT

## 2023-12-11 PROCEDURE — 2700000000 HC OXYGEN THERAPY PER DAY

## 2023-12-11 PROCEDURE — 2060000000 HC ICU INTERMEDIATE R&B

## 2023-12-11 PROCEDURE — 2580000003 HC RX 258

## 2023-12-11 PROCEDURE — 2500000003 HC RX 250 WO HCPCS

## 2023-12-11 PROCEDURE — 93622 COMP EP EVAL L VENTR PAC&REC: CPT | Performed by: INTERNAL MEDICINE

## 2023-12-11 PROCEDURE — C1766 INTRO/SHEATH,STRBLE,NON-PEEL: HCPCS | Performed by: INTERNAL MEDICINE

## 2023-12-11 PROCEDURE — 93312 ECHO TRANSESOPHAGEAL: CPT

## 2023-12-11 PROCEDURE — 2580000003 HC RX 258: Performed by: NURSE ANESTHETIST, CERTIFIED REGISTERED

## 2023-12-11 PROCEDURE — 7100000001 HC PACU RECOVERY - ADDTL 15 MIN: Performed by: ANESTHESIOLOGY

## 2023-12-11 PROCEDURE — 94761 N-INVAS EAR/PLS OXIMETRY MLT: CPT

## 2023-12-11 PROCEDURE — 2720000010 HC SURG SUPPLY STERILE: Performed by: INTERNAL MEDICINE

## 2023-12-11 PROCEDURE — 02583ZZ DESTRUCTION OF CONDUCTION MECHANISM, PERCUTANEOUS APPROACH: ICD-10-PCS

## 2023-12-11 PROCEDURE — C1759 CATH, INTRA ECHOCARDIOGRAPHY: HCPCS | Performed by: INTERNAL MEDICINE

## 2023-12-11 PROCEDURE — C1893 INTRO/SHEATH, FIXED,NON-PEEL: HCPCS | Performed by: INTERNAL MEDICINE

## 2023-12-11 PROCEDURE — 93656 COMPRE EP EVAL ABLTJ ATR FIB: CPT

## 2023-12-11 PROCEDURE — 93325 DOPPLER ECHO COLOR FLOW MAPG: CPT

## 2023-12-11 PROCEDURE — 85347 COAGULATION TIME ACTIVATED: CPT

## 2023-12-11 PROCEDURE — 93320 DOPPLER ECHO COMPLETE: CPT

## 2023-12-11 RX ORDER — OXYCODONE HYDROCHLORIDE 5 MG/1
10 TABLET ORAL PRN
Status: DISCONTINUED | OUTPATIENT
Start: 2023-12-11 | End: 2023-12-11 | Stop reason: HOSPADM

## 2023-12-11 RX ORDER — HEPARIN SODIUM 10000 [USP'U]/100ML
INJECTION, SOLUTION INTRAVENOUS CONTINUOUS PRN
Status: DISCONTINUED | OUTPATIENT
Start: 2023-12-11 | End: 2023-12-14 | Stop reason: HOSPADM

## 2023-12-11 RX ORDER — ASPIRIN 81 MG/1
81 TABLET, CHEWABLE ORAL DAILY
Status: DISCONTINUED | OUTPATIENT
Start: 2023-12-11 | End: 2023-12-14 | Stop reason: HOSPADM

## 2023-12-11 RX ORDER — HYDROMORPHONE HYDROCHLORIDE 1 MG/ML
0.25 INJECTION, SOLUTION INTRAMUSCULAR; INTRAVENOUS; SUBCUTANEOUS EVERY 5 MIN PRN
Status: DISCONTINUED | OUTPATIENT
Start: 2023-12-11 | End: 2023-12-12 | Stop reason: HOSPADM

## 2023-12-11 RX ORDER — COLCHICINE 0.6 MG/1
0.6 TABLET ORAL DAILY
Status: DISCONTINUED | OUTPATIENT
Start: 2023-12-11 | End: 2023-12-14 | Stop reason: HOSPADM

## 2023-12-11 RX ORDER — OXYCODONE HYDROCHLORIDE AND ACETAMINOPHEN 5; 325 MG/1; MG/1
1 TABLET ORAL EVERY 4 HOURS PRN
Status: DISCONTINUED | OUTPATIENT
Start: 2023-12-11 | End: 2023-12-14 | Stop reason: HOSPADM

## 2023-12-11 RX ORDER — SODIUM CHLORIDE 0.9 % (FLUSH) 0.9 %
5-40 SYRINGE (ML) INJECTION EVERY 12 HOURS SCHEDULED
Status: DISCONTINUED | OUTPATIENT
Start: 2023-12-11 | End: 2023-12-14 | Stop reason: HOSPADM

## 2023-12-11 RX ORDER — ROCURONIUM BROMIDE 10 MG/ML
INJECTION, SOLUTION INTRAVENOUS PRN
Status: DISCONTINUED | OUTPATIENT
Start: 2023-12-11 | End: 2023-12-11 | Stop reason: SDUPTHER

## 2023-12-11 RX ORDER — SODIUM CHLORIDE 0.9 % (FLUSH) 0.9 %
5-40 SYRINGE (ML) INJECTION PRN
Status: DISCONTINUED | OUTPATIENT
Start: 2023-12-11 | End: 2023-12-14 | Stop reason: HOSPADM

## 2023-12-11 RX ORDER — METOPROLOL SUCCINATE 25 MG/1
100 TABLET, EXTENDED RELEASE ORAL 2 TIMES DAILY
Status: DISCONTINUED | OUTPATIENT
Start: 2023-12-11 | End: 2023-12-14

## 2023-12-11 RX ORDER — LEVOTHYROXINE SODIUM 88 UG/1
88 TABLET ORAL DAILY
Status: DISCONTINUED | OUTPATIENT
Start: 2023-12-11 | End: 2023-12-14 | Stop reason: HOSPADM

## 2023-12-11 RX ORDER — ONDANSETRON 2 MG/ML
INJECTION INTRAMUSCULAR; INTRAVENOUS PRN
Status: DISCONTINUED | OUTPATIENT
Start: 2023-12-11 | End: 2023-12-11 | Stop reason: SDUPTHER

## 2023-12-11 RX ORDER — LOSARTAN POTASSIUM 25 MG/1
25 TABLET ORAL DAILY
Status: DISCONTINUED | OUTPATIENT
Start: 2023-12-12 | End: 2023-12-14 | Stop reason: HOSPADM

## 2023-12-11 RX ORDER — SODIUM CHLORIDE 9 MG/ML
INJECTION, SOLUTION INTRAVENOUS PRN
Status: DISCONTINUED | OUTPATIENT
Start: 2023-12-11 | End: 2023-12-12 | Stop reason: HOSPADM

## 2023-12-11 RX ORDER — SUCRALFATE 1 G/1
1 TABLET ORAL EVERY 8 HOURS SCHEDULED
Status: DISCONTINUED | OUTPATIENT
Start: 2023-12-11 | End: 2023-12-14 | Stop reason: HOSPADM

## 2023-12-11 RX ORDER — PROPOFOL 10 MG/ML
INJECTION, EMULSION INTRAVENOUS PRN
Status: DISCONTINUED | OUTPATIENT
Start: 2023-12-11 | End: 2023-12-11 | Stop reason: SDUPTHER

## 2023-12-11 RX ORDER — DEXAMETHASONE SODIUM PHOSPHATE 4 MG/ML
INJECTION, SOLUTION INTRA-ARTICULAR; INTRALESIONAL; INTRAMUSCULAR; INTRAVENOUS; SOFT TISSUE PRN
Status: DISCONTINUED | OUTPATIENT
Start: 2023-12-11 | End: 2023-12-11 | Stop reason: SDUPTHER

## 2023-12-11 RX ORDER — ACETAMINOPHEN 325 MG/1
650 TABLET ORAL EVERY 4 HOURS PRN
Status: DISCONTINUED | OUTPATIENT
Start: 2023-12-11 | End: 2023-12-14 | Stop reason: HOSPADM

## 2023-12-11 RX ORDER — SODIUM CHLORIDE 0.9 % (FLUSH) 0.9 %
5-40 SYRINGE (ML) INJECTION PRN
Status: DISCONTINUED | OUTPATIENT
Start: 2023-12-11 | End: 2023-12-12 | Stop reason: HOSPADM

## 2023-12-11 RX ORDER — SODIUM CHLORIDE 9 MG/ML
INJECTION, SOLUTION INTRAVENOUS PRN
Status: DISCONTINUED | OUTPATIENT
Start: 2023-12-11 | End: 2023-12-14 | Stop reason: HOSPADM

## 2023-12-11 RX ORDER — HYDROMORPHONE HYDROCHLORIDE 1 MG/ML
0.5 INJECTION, SOLUTION INTRAMUSCULAR; INTRAVENOUS; SUBCUTANEOUS EVERY 5 MIN PRN
Status: DISCONTINUED | OUTPATIENT
Start: 2023-12-11 | End: 2023-12-12 | Stop reason: HOSPADM

## 2023-12-11 RX ORDER — OXYCODONE HYDROCHLORIDE 5 MG/1
5 TABLET ORAL PRN
Status: DISCONTINUED | OUTPATIENT
Start: 2023-12-11 | End: 2023-12-11 | Stop reason: HOSPADM

## 2023-12-11 RX ORDER — FUROSEMIDE 10 MG/ML
60 INJECTION INTRAMUSCULAR; INTRAVENOUS ONCE
Status: DISCONTINUED | OUTPATIENT
Start: 2023-12-11 | End: 2023-12-14 | Stop reason: HOSPADM

## 2023-12-11 RX ORDER — LIDOCAINE HYDROCHLORIDE 20 MG/ML
INJECTION, SOLUTION INFILTRATION; PERINEURAL PRN
Status: DISCONTINUED | OUTPATIENT
Start: 2023-12-11 | End: 2023-12-11 | Stop reason: SDUPTHER

## 2023-12-11 RX ORDER — ONDANSETRON 2 MG/ML
4 INJECTION INTRAMUSCULAR; INTRAVENOUS EVERY 6 HOURS PRN
Status: DISCONTINUED | OUTPATIENT
Start: 2023-12-11 | End: 2023-12-14

## 2023-12-11 RX ORDER — HYDROMORPHONE HYDROCHLORIDE 1 MG/ML
0.25 INJECTION, SOLUTION INTRAMUSCULAR; INTRAVENOUS; SUBCUTANEOUS EVERY 4 HOURS PRN
Status: DISCONTINUED | OUTPATIENT
Start: 2023-12-11 | End: 2023-12-14 | Stop reason: HOSPADM

## 2023-12-11 RX ORDER — MEPERIDINE HYDROCHLORIDE 50 MG/ML
12.5 INJECTION INTRAMUSCULAR; INTRAVENOUS; SUBCUTANEOUS EVERY 5 MIN PRN
Status: DISCONTINUED | OUTPATIENT
Start: 2023-12-11 | End: 2023-12-12 | Stop reason: HOSPADM

## 2023-12-11 RX ORDER — PHENYLEPHRINE HCL IN 0.9% NACL 1 MG/10 ML
SYRINGE (ML) INTRAVENOUS PRN
Status: DISCONTINUED | OUTPATIENT
Start: 2023-12-11 | End: 2023-12-11 | Stop reason: SDUPTHER

## 2023-12-11 RX ORDER — HYDRALAZINE HYDROCHLORIDE 20 MG/ML
5 INJECTION INTRAMUSCULAR; INTRAVENOUS EVERY 6 HOURS PRN
Status: DISCONTINUED | OUTPATIENT
Start: 2023-12-11 | End: 2023-12-14 | Stop reason: HOSPADM

## 2023-12-11 RX ORDER — DOFETILIDE 0.25 MG/1
250 CAPSULE ORAL EVERY 12 HOURS SCHEDULED
Status: DISCONTINUED | OUTPATIENT
Start: 2023-12-11 | End: 2023-12-14

## 2023-12-11 RX ORDER — SODIUM CHLORIDE 0.9 % (FLUSH) 0.9 %
5-40 SYRINGE (ML) INJECTION EVERY 12 HOURS SCHEDULED
Status: DISCONTINUED | OUTPATIENT
Start: 2023-12-11 | End: 2023-12-12 | Stop reason: HOSPADM

## 2023-12-11 RX ORDER — ONDANSETRON 2 MG/ML
4 INJECTION INTRAMUSCULAR; INTRAVENOUS
Status: DISCONTINUED | OUTPATIENT
Start: 2023-12-11 | End: 2023-12-12 | Stop reason: HOSPADM

## 2023-12-11 RX ORDER — MAGNESIUM SULFATE IN WATER 40 MG/ML
2000 INJECTION, SOLUTION INTRAVENOUS ONCE
Status: DISCONTINUED | OUTPATIENT
Start: 2023-12-11 | End: 2023-12-14 | Stop reason: HOSPADM

## 2023-12-11 RX ORDER — PANTOPRAZOLE SODIUM 40 MG/1
40 TABLET, DELAYED RELEASE ORAL
Status: DISCONTINUED | OUTPATIENT
Start: 2023-12-11 | End: 2023-12-14 | Stop reason: HOSPADM

## 2023-12-11 RX ORDER — LORAZEPAM 0.5 MG/1
0.5 TABLET ORAL
Status: ACTIVE | OUTPATIENT
Start: 2023-12-11 | End: 2023-12-12

## 2023-12-11 RX ADMIN — DOFETILIDE 250 MCG: 0.25 CAPSULE ORAL at 21:49

## 2023-12-11 RX ADMIN — HEPARIN SODIUM 8000 UNITS/HR: 10000 INJECTION, SOLUTION INTRAVENOUS at 11:11

## 2023-12-11 RX ADMIN — SODIUM CHLORIDE: 9 INJECTION, SOLUTION INTRAVENOUS at 08:18

## 2023-12-11 RX ADMIN — PHENYLEPHRINE HYDROCHLORIDE 20 MCG/MIN: 10 INJECTION INTRAVENOUS at 09:09

## 2023-12-11 RX ADMIN — PANTOPRAZOLE SODIUM 40 MG: 40 TABLET, DELAYED RELEASE ORAL at 18:12

## 2023-12-11 RX ADMIN — PROPOFOL 20 MG: 10 INJECTION, EMULSION INTRAVENOUS at 10:25

## 2023-12-11 RX ADMIN — Medication 100 MCG: at 09:07

## 2023-12-11 RX ADMIN — SUCRALFATE 1 G: 1 TABLET ORAL at 18:11

## 2023-12-11 RX ADMIN — Medication 100 MCG: at 09:28

## 2023-12-11 RX ADMIN — COLCHICINE 0.6 MG: 0.6 TABLET ORAL at 18:11

## 2023-12-11 RX ADMIN — ASPIRIN 81 MG: 81 TABLET, CHEWABLE ORAL at 18:29

## 2023-12-11 RX ADMIN — Medication 50 MCG: at 09:55

## 2023-12-11 RX ADMIN — APIXABAN 5 MG: 5 TABLET, FILM COATED ORAL at 20:42

## 2023-12-11 RX ADMIN — PROPOFOL 100 MG: 10 INJECTION, EMULSION INTRAVENOUS at 08:29

## 2023-12-11 RX ADMIN — Medication 10 ML: at 20:43

## 2023-12-11 RX ADMIN — ROCURONIUM BROMIDE 50 MG: 50 INJECTION, SOLUTION INTRAVENOUS at 08:29

## 2023-12-11 RX ADMIN — Medication 100 MCG: at 09:08

## 2023-12-11 RX ADMIN — PROPOFOL 30 MG: 10 INJECTION, EMULSION INTRAVENOUS at 09:01

## 2023-12-11 RX ADMIN — Medication 50 MCG: at 10:11

## 2023-12-11 RX ADMIN — Medication 100 MCG: at 09:05

## 2023-12-11 RX ADMIN — Medication 100 MCG: at 08:43

## 2023-12-11 RX ADMIN — Medication 50 MCG: at 11:14

## 2023-12-11 RX ADMIN — Medication 50 MCG: at 09:54

## 2023-12-11 RX ADMIN — ROCURONIUM BROMIDE 10 MG: 50 INJECTION, SOLUTION INTRAVENOUS at 09:30

## 2023-12-11 RX ADMIN — METOPROLOL SUCCINATE 100 MG: 25 TABLET, EXTENDED RELEASE ORAL at 20:42

## 2023-12-11 RX ADMIN — SUCRALFATE 1 G: 1 TABLET ORAL at 22:05

## 2023-12-11 RX ADMIN — Medication 100 MCG: at 08:52

## 2023-12-11 RX ADMIN — ROCURONIUM BROMIDE 10 MG: 50 INJECTION, SOLUTION INTRAVENOUS at 10:15

## 2023-12-11 RX ADMIN — SUGAMMADEX 200 MG: 100 INJECTION, SOLUTION INTRAVENOUS at 11:49

## 2023-12-11 RX ADMIN — DEXAMETHASONE SODIUM PHOSPHATE 4 MG: 4 INJECTION, SOLUTION INTRAMUSCULAR; INTRAVENOUS at 09:18

## 2023-12-11 RX ADMIN — LIDOCAINE HYDROCHLORIDE 20 MG: 20 INJECTION, SOLUTION INFILTRATION; PERINEURAL at 08:29

## 2023-12-11 RX ADMIN — ONDANSETRON 4 MG: 2 INJECTION INTRAMUSCULAR; INTRAVENOUS at 09:18

## 2023-12-11 ASSESSMENT — LIFESTYLE VARIABLES: SMOKING_STATUS: 0

## 2023-12-11 ASSESSMENT — ENCOUNTER SYMPTOMS: SHORTNESS OF BREATH: 1

## 2023-12-11 NOTE — ANESTHESIA PRE PROCEDURE
history and physical.    DOS STAFF ADDENDUM:    Pt seen and examined, chart reviewed (including anesthesia, drug and allergy history). No interval changes to history and physical examination. Anesthetic plan, risks, benefits, alternatives, and personnel involved discussed with patient. Patient verbalized an understanding and agrees to proceed.       Taylor Perez MD  December 11, 2023  8:04 AM    Taylor Perez MD   12/11/2023

## 2023-12-11 NOTE — PROGRESS NOTES
Report given to keo on A2. All questions answered at this time. CMU updated regarding upcoming transfer to room 206    Patient remains on bedrest at this time due to hematoma on L groin site (pressed out, site with hemostasis at this time).

## 2023-12-11 NOTE — PROCEDURES
[facility]  Electrophysiology Procedure Report    Attending   Benito Whitney MD    Procedures Atrial fibrillation ablation  Diagnostic electrophysiologic study  3-D electroanatomical mapping of the left atrium and right atrium  Transseptal catheterization  Wide area circumferential ablation with PV isolation  Ablation of typical atrial flutter  Intra-cardiac echocardiography    Indications  Symptomatic atrial fibrillation    Start: 0934  Stop: 1149    Description of Procedure   After the risks and benefits of the procedure were explained and informed consent was obtained, the patient was brought to the electrophysiology lab in the nonsedated and fasting state. General anesthesia was administered under anesthesia physician/nurse guidance (please see anesthesia records for medication details). ROBERTO was performed. No left atrial thrombus noted in left atriumA wick catheter was placed prior to the start of the procedure. The patient was connected to the EP lab monitoring system. The patient was prepared and draped in standard fashion. Using the modified Seldinger technique, the following sheaths and catheters were inserted under ultrasound guidance using micropuncture kits:    1. A 8 F sheath was inserted into the right femoral vein. 2.  A 8 F sheath was inserted into the right femoral vein. 3.  A 6 F sheath was inserted into the left femoral vein. 4.  A 9 F sheath was inserted into the left femoral vein. 5.  Unable to place right radial arterial line. The patient arrived in atrial fibrillation. The ICE catheter was advanced into the right right atrium and right venricle for transseptal, mapping, and function/mechanical evaluation during ablation. After anatomy was generated the [employer] MONT[facility]LLO catheter was advanced into the IVC, SVC and RV for right atrial anatomy using the Carto Mapping system (3D).   The [facility] was then advanced into the coronary sinus for left atrial pacing and electrophysiology was performed to induce atrial tachycardia/flutter on isoproterenol. Typical atrial flutter was induced and confirmed with entrainment mapping. Typical atrial flutter ablation  After determining that the cavotricuspid isthmus was in the circuit, the decision to proceed with the cavotricuspid isthmus ablation was made. The ablation catheter was positioned across the isthmus, and a linear ablation was performed from the AV groove through the cavotricuspid isthmus to the insertion of the inferior vena cava. During the application of energy, atrial flutter was terminated. Conduction across CTI. A second CTI line and consolidation was required for bidirectional block across CTI. Pacing was performed at both sides of the isthmus ablation site and revealed bidirectional conduction block across the isthmus. After 20 minutes of waiting time with isoproterenol infusion up to 4 mcg/min (20 minutes), evidence of ongoing conduction block across the isthmus was still present. Electrophysiology study was then performed (see below for details). LV pacing using ablation catheter was performed to assess retrograde conduction and possible accessory pathways (none identified). Basic intervals were obtained. Catheters were removed (Vascade hemostasis system was utilized for hemostasis) and heparin was reversed per standard protocol using protamine. The patient was allowed to awake from anesthesia and was returned to prep and recovery for further recovery. Dr. Minh Sahu MD, the attending physician, was present throughout the procedure and for interpretation of the data.      Complications None  EBL   <20cc    Pericardium: No pericardial effusion post ablation    RESULTS    Fluoroscopy Time: 0.4 minutes    Ablation Time: 1017 seconds  Ablation Lesions:141    ECG   Baseline   Atrial fibrillation with RVR     Post Ablation   normal sinus rhythm with nonspecific ST-T wave abnormalities    RESULTS  Basic

## 2023-12-11 NOTE — PLAN OF CARE
Patient presented for atrial fibrillation ablation. Bilateral venous groin access. Full note to come.     Parth Pack MD  Cardiac Electrophysiology  [id number]  [phone] [facility]

## 2023-12-11 NOTE — PROGRESS NOTES
VSS room air. Patient sitting up 30-45 degrees, bilat groin sites remain unremarkable (L side w/ bruises from pressing out previous hematoma and blood on gauze (not active or growing)). All belongings gathered. CMU updated and meal ordered to room 206. BEST Meehan Junior updated.  Pt's daughter remains at bedside for assistance

## 2023-12-11 NOTE — PROGRESS NOTES
Pt brought to PACU. Report obtained from cath lab RN and anesthesia. Pt placed on monitor SR and O2 at 2 LNC. Bilateral groins C/D/I without a hematoma, closure device vascade.

## 2023-12-12 LAB
ANION GAP SERPL CALCULATED.3IONS-SCNC: 9 MMOL/L (ref 3–16)
BUN SERPL-MCNC: 15 MG/DL (ref 7–20)
CALCIUM SERPL-MCNC: 8.1 MG/DL (ref 8.3–10.6)
CHLORIDE SERPL-SCNC: 103 MMOL/L (ref 99–110)
CO2 SERPL-SCNC: 21 MMOL/L (ref 21–32)
CREAT SERPL-MCNC: 0.8 MG/DL (ref 0.6–1.2)
DEPRECATED RDW RBC AUTO: 14.3 % (ref 12.4–15.4)
EKG ATRIAL RATE: 62 BPM
EKG ATRIAL RATE: 66 BPM
EKG DIAGNOSIS: NORMAL
EKG DIAGNOSIS: NORMAL
EKG P AXIS: 73 DEGREES
EKG P AXIS: 91 DEGREES
EKG P-R INTERVAL: 220 MS
EKG P-R INTERVAL: 224 MS
EKG Q-T INTERVAL: 454 MS
EKG Q-T INTERVAL: 478 MS
EKG QRS DURATION: 86 MS
EKG QRS DURATION: 90 MS
EKG QTC CALCULATION (BAZETT): 475 MS
EKG QTC CALCULATION (BAZETT): 485 MS
EKG R AXIS: 61 DEGREES
EKG R AXIS: 63 DEGREES
EKG T AXIS: 47 DEGREES
EKG T AXIS: 59 DEGREES
EKG VENTRICULAR RATE: 62 BPM
EKG VENTRICULAR RATE: 66 BPM
GFR SERPLBLD CREATININE-BSD FMLA CKD-EPI: >60 ML/MIN/{1.73_M2}
GLUCOSE SERPL-MCNC: 141 MG/DL (ref 70–99)
HCT VFR BLD AUTO: 34 % (ref 36–48)
HGB BLD-MCNC: 11.3 G/DL (ref 12–16)
MAGNESIUM SERPL-MCNC: 1.9 MG/DL (ref 1.8–2.4)
MCH RBC QN AUTO: 29.9 PG (ref 26–34)
MCHC RBC AUTO-ENTMCNC: 33.1 G/DL (ref 31–36)
MCV RBC AUTO: 90.3 FL (ref 80–100)
PLATELET # BLD AUTO: 235 K/UL (ref 135–450)
PMV BLD AUTO: 7.8 FL (ref 5–10.5)
POTASSIUM SERPL-SCNC: 4 MMOL/L (ref 3.5–5.1)
RBC # BLD AUTO: 3.77 M/UL (ref 4–5.2)
SODIUM SERPL-SCNC: 133 MMOL/L (ref 136–145)
WBC # BLD AUTO: 12.8 K/UL (ref 4–11)

## 2023-12-12 PROCEDURE — 93010 ELECTROCARDIOGRAM REPORT: CPT | Performed by: INTERNAL MEDICINE

## 2023-12-12 PROCEDURE — 93005 ELECTROCARDIOGRAM TRACING: CPT | Performed by: INTERNAL MEDICINE

## 2023-12-12 PROCEDURE — 83735 ASSAY OF MAGNESIUM: CPT

## 2023-12-12 PROCEDURE — 6370000000 HC RX 637 (ALT 250 FOR IP): Performed by: INTERNAL MEDICINE

## 2023-12-12 PROCEDURE — 99232 SBSQ HOSP IP/OBS MODERATE 35: CPT | Performed by: NURSE PRACTITIONER

## 2023-12-12 PROCEDURE — 6360000002 HC RX W HCPCS: Performed by: NURSE PRACTITIONER

## 2023-12-12 PROCEDURE — 2060000000 HC ICU INTERMEDIATE R&B

## 2023-12-12 PROCEDURE — 2580000003 HC RX 258: Performed by: INTERNAL MEDICINE

## 2023-12-12 PROCEDURE — 80048 BASIC METABOLIC PNL TOTAL CA: CPT

## 2023-12-12 PROCEDURE — 85027 COMPLETE CBC AUTOMATED: CPT

## 2023-12-12 RX ORDER — MAGNESIUM SULFATE 1 G/100ML
1000 INJECTION INTRAVENOUS ONCE
Status: COMPLETED | OUTPATIENT
Start: 2023-12-12 | End: 2023-12-12

## 2023-12-12 RX ADMIN — LEVOTHYROXINE SODIUM 88 MCG: 88 TABLET ORAL at 07:14

## 2023-12-12 RX ADMIN — Medication 10 ML: at 20:01

## 2023-12-12 RX ADMIN — SUCRALFATE 1 G: 1 TABLET ORAL at 22:02

## 2023-12-12 RX ADMIN — Medication 10 ML: at 09:21

## 2023-12-12 RX ADMIN — PANTOPRAZOLE SODIUM 40 MG: 40 TABLET, DELAYED RELEASE ORAL at 16:02

## 2023-12-12 RX ADMIN — APIXABAN 5 MG: 5 TABLET, FILM COATED ORAL at 20:00

## 2023-12-12 RX ADMIN — APIXABAN 5 MG: 5 TABLET, FILM COATED ORAL at 09:22

## 2023-12-12 RX ADMIN — COLCHICINE 0.6 MG: 0.6 TABLET ORAL at 09:21

## 2023-12-12 RX ADMIN — PANTOPRAZOLE SODIUM 40 MG: 40 TABLET, DELAYED RELEASE ORAL at 04:58

## 2023-12-12 RX ADMIN — LOSARTAN POTASSIUM 25 MG: 25 TABLET, FILM COATED ORAL at 09:21

## 2023-12-12 RX ADMIN — METOPROLOL SUCCINATE 100 MG: 25 TABLET, EXTENDED RELEASE ORAL at 20:00

## 2023-12-12 RX ADMIN — MAGNESIUM SULFATE HEPTAHYDRATE 1000 MG: 1 INJECTION, SOLUTION INTRAVENOUS at 11:35

## 2023-12-12 RX ADMIN — METOPROLOL SUCCINATE 100 MG: 25 TABLET, EXTENDED RELEASE ORAL at 09:21

## 2023-12-12 RX ADMIN — SUCRALFATE 1 G: 1 TABLET ORAL at 04:57

## 2023-12-12 RX ADMIN — SUCRALFATE 1 G: 1 TABLET ORAL at 14:20

## 2023-12-12 RX ADMIN — DOFETILIDE 250 MCG: 0.25 CAPSULE ORAL at 09:21

## 2023-12-12 RX ADMIN — ASPIRIN 81 MG: 81 TABLET, CHEWABLE ORAL at 09:22

## 2023-12-12 RX ADMIN — DOFETILIDE 250 MCG: 0.25 CAPSULE ORAL at 20:00

## 2023-12-12 ASSESSMENT — PAIN SCALES - GENERAL
PAINLEVEL_OUTOF10: 0
PAINLEVEL_OUTOF10: 0

## 2023-12-12 NOTE — PLAN OF CARE
Problem: Safety - Adult  Goal: Free from fall injury  12/12/2023 1236 by Shavonne Keller RN  Outcome: Progressing  Note: Pt will remain free from falls throughout hospital stay. Fall precautions in place, bed alarm on, bed in lowest position with wheels locked and side rails 2/4 up. Room door open and hourly rounding completed. Will continue to monitor throughout shift. Problem: Pain  Goal: Verbalizes/displays adequate comfort level or baseline comfort level  12/12/2023 1236 by Shavonne Keller RN  Outcome: Progressing  Note: Pt will be satisfied with pain control. Pt uses numeric pain rating scale with reassessments after pain med administration. Will continue to monitor progression throughout shift.

## 2023-12-12 NOTE — PLAN OF CARE
Problem: Safety - Adult  Goal: Free from fall injury  Outcome: Progressing     Problem: Cardiovascular - Adult  Goal: Maintains optimal cardiac output and hemodynamic stability  Outcome: Progressing  Flowsheets (Taken 12/11/2023 2006)  Maintains optimal cardiac output and hemodynamic stability: Monitor blood pressure and heart rate  Goal: Absence of cardiac dysrhythmias or at baseline  Outcome: Progressing  Flowsheets (Taken 12/11/2023 2006)  Absence of cardiac dysrhythmias or at baseline:   Monitor cardiac rate and rhythm   Assess for signs of decreased cardiac output   Administer antiarrhythmia medication and electrolyte replacement as ordered     Problem: Discharge Planning  Goal: Discharge to home or other facility with appropriate resources  Recent Flowsheet Documentation  Taken 12/11/2023 1700 by Ann Marie San RN  Discharge to home or other facility with appropriate resources: Identify barriers to discharge with patient and caregiver

## 2023-12-12 NOTE — PROGRESS NOTES
4 Eyes Skin Assessment     The patient is being assess for  Admission    I agree that 2 RN's have performed a thorough Head to Toe Skin Assessment on the patient. ALL assessment sites listed below have been assessed. Areas assessed by both nurses:   [x]   Head, Face, and Ears: Intact  [x]   Shoulders, Back, and Chest: Intact  [x]   Arms, Elbows, and Hands: Right radial site:  Heavily bruised  [x]   Coccyx, Sacrum, and Ischum: Coccyx red/pink: blanchable. Bilateral groin venous sites. Left groin heavily bruisied  [x]   Legs, Feet, and Heels:  Bilateral heels red/ blanchable         Does the Patient have Skin Breakdown?   No         Jared Prevention initiated:  No   Wound Care Orders initiated:  No      [facility] nurse consulted for Pressure Injury (Stage 3,4, Unstageable, DTI, NWPT, and Complex wounds):  No      Nurse 1 eSignature: Electronically signed by Sakshi Thurston RN on 12/11/23 at 7:22 PM EST    **SHARE this note so that the co-signing nurse is able to place an eSignature**    Nurse 2 eSignature: {Esignature:[id number]}

## 2023-12-12 NOTE — FLOWSHEET NOTE
Bedside shift handoff with Glena Schirmer, RN    All 3 sites assessed. Right radial site oozing. Manual pressure, dressing placed. 12/11/23 1915   Puncture Site Assessment 1   Location Femoral - right   Site Assessment No redness, drainage, swelling or hematoma   Hemostasis Intervention Closure device   Dressing Applied Transparent occlusive dressing   Multiple puncture sites Yes   Location 2 Femoral-left   Site Assessment 2 Hematoma; Ecchymotic;Bleeding  (Unchanged from prior)    Dressing Applied 2 Transparent occlusive   Location 3 Radial-right   Site Assessment 3 Ecchymotic;Oozing   Hemostasis Intervention 3 Manual pressure   Dressing Applied 3 Transparent occlusive

## 2023-12-12 NOTE — PLAN OF CARE
Problem: Discharge Planning  Goal: Discharge to home or other facility with appropriate resources  Outcome: Progressing  Discharge to home or other facility with appropriate resources: Identify barriers to discharge with patient and caregiver     Problem: Safety - Adult  Goal: Free from fall injury  12/11/2023 2348 by Madi Raymundo RN  Outcome: Progressing  Note: Pt will remain free from falls throughout hospital stay. Fall precautions in place, bed alarm on, bed in lowest position with wheels locked and side rails 2/4 up. Room door open and hourly rounding completed. Will continue to monitor throughout shift. Problem: ABCDS Injury Assessment  Goal: Absence of physical injury  Outcome: Progressing     Problem: Cardiovascular - Adult  Goal: Maintains optimal cardiac output and hemodynamic stability  12/11/2023 2348 by Madi Raymundo RN  Outcome: Progressing     Problem: Cardiovascular - Adult  Goal: Absence of cardiac dysrhythmias or at baseline  12/11/2023 2348 by Madi Raymundo RN  Outcome: Progressing     Problem: Pain  Goal: Verbalizes/displays adequate comfort level or baseline comfort level  Outcome: Progressing  Note: Pt will be satisfied with pain control. Pt uses numeric pain rating scale with reassessments after pain med administration. Will continue to monitor progression throughout shift.

## 2023-12-13 LAB
ANION GAP SERPL CALCULATED.3IONS-SCNC: 7 MMOL/L (ref 3–16)
BASOPHILS # BLD: 0.1 K/UL (ref 0–0.2)
BASOPHILS NFR BLD: 0.6 %
BUN SERPL-MCNC: 21 MG/DL (ref 7–20)
CALCIUM SERPL-MCNC: 8.2 MG/DL (ref 8.3–10.6)
CHLORIDE SERPL-SCNC: 101 MMOL/L (ref 99–110)
CO2 SERPL-SCNC: 22 MMOL/L (ref 21–32)
CREAT SERPL-MCNC: 1 MG/DL (ref 0.6–1.2)
DEPRECATED RDW RBC AUTO: 14.3 % (ref 12.4–15.4)
EKG ATRIAL RATE: 61 BPM
EKG ATRIAL RATE: 62 BPM
EKG DIAGNOSIS: NORMAL
EKG DIAGNOSIS: NORMAL
EKG P AXIS: 4 DEGREES
EKG P AXIS: 80 DEGREES
EKG P-R INTERVAL: 208 MS
EKG P-R INTERVAL: 222 MS
EKG Q-T INTERVAL: 460 MS
EKG Q-T INTERVAL: 468 MS
EKG QRS DURATION: 86 MS
EKG QRS DURATION: 88 MS
EKG QTC CALCULATION (BAZETT): 463 MS
EKG QTC CALCULATION (BAZETT): 475 MS
EKG R AXIS: 52 DEGREES
EKG R AXIS: 56 DEGREES
EKG T AXIS: 25 DEGREES
EKG T AXIS: 43 DEGREES
EKG VENTRICULAR RATE: 61 BPM
EKG VENTRICULAR RATE: 62 BPM
EOSINOPHIL # BLD: 0.2 K/UL (ref 0–0.6)
EOSINOPHIL NFR BLD: 1.8 %
GFR SERPLBLD CREATININE-BSD FMLA CKD-EPI: 56 ML/MIN/{1.73_M2}
GLUCOSE SERPL-MCNC: 115 MG/DL (ref 70–99)
HCT VFR BLD AUTO: 28.9 % (ref 36–48)
HGB BLD-MCNC: 9.7 G/DL (ref 12–16)
LYMPHOCYTES # BLD: 3.7 K/UL (ref 1–5.1)
LYMPHOCYTES NFR BLD: 36 %
MAGNESIUM SERPL-MCNC: 2.1 MG/DL (ref 1.8–2.4)
MCH RBC QN AUTO: 30.4 PG (ref 26–34)
MCHC RBC AUTO-ENTMCNC: 33.7 G/DL (ref 31–36)
MCV RBC AUTO: 90.2 FL (ref 80–100)
MONOCYTES # BLD: 1.1 K/UL (ref 0–1.3)
MONOCYTES NFR BLD: 10.8 %
NEUTROPHILS # BLD: 5.3 K/UL (ref 1.7–7.7)
NEUTROPHILS NFR BLD: 50.8 %
PLATELET # BLD AUTO: 202 K/UL (ref 135–450)
PMV BLD AUTO: 7.3 FL (ref 5–10.5)
POTASSIUM SERPL-SCNC: 3.9 MMOL/L (ref 3.5–5.1)
RBC # BLD AUTO: 3.21 M/UL (ref 4–5.2)
SODIUM SERPL-SCNC: 130 MMOL/L (ref 136–145)
WBC # BLD AUTO: 10.4 K/UL (ref 4–11)

## 2023-12-13 PROCEDURE — 93010 ELECTROCARDIOGRAM REPORT: CPT | Performed by: INTERNAL MEDICINE

## 2023-12-13 PROCEDURE — 6370000000 HC RX 637 (ALT 250 FOR IP): Performed by: INTERNAL MEDICINE

## 2023-12-13 PROCEDURE — 85025 COMPLETE CBC W/AUTO DIFF WBC: CPT

## 2023-12-13 PROCEDURE — 83735 ASSAY OF MAGNESIUM: CPT

## 2023-12-13 PROCEDURE — 2580000003 HC RX 258: Performed by: INTERNAL MEDICINE

## 2023-12-13 PROCEDURE — 93005 ELECTROCARDIOGRAM TRACING: CPT | Performed by: INTERNAL MEDICINE

## 2023-12-13 PROCEDURE — 6360000002 HC RX W HCPCS: Performed by: NURSE PRACTITIONER

## 2023-12-13 PROCEDURE — 80048 BASIC METABOLIC PNL TOTAL CA: CPT

## 2023-12-13 PROCEDURE — 2060000000 HC ICU INTERMEDIATE R&B

## 2023-12-13 PROCEDURE — 99232 SBSQ HOSP IP/OBS MODERATE 35: CPT | Performed by: NURSE PRACTITIONER

## 2023-12-13 RX ORDER — FUROSEMIDE 10 MG/ML
40 INJECTION INTRAMUSCULAR; INTRAVENOUS ONCE
Status: COMPLETED | OUTPATIENT
Start: 2023-12-13 | End: 2023-12-13

## 2023-12-13 RX ORDER — MAGNESIUM SULFATE IN WATER 40 MG/ML
2000 INJECTION, SOLUTION INTRAVENOUS ONCE
Status: COMPLETED | OUTPATIENT
Start: 2023-12-13 | End: 2023-12-13

## 2023-12-13 RX ADMIN — PANTOPRAZOLE SODIUM 40 MG: 40 TABLET, DELAYED RELEASE ORAL at 05:18

## 2023-12-13 RX ADMIN — SUCRALFATE 1 G: 1 TABLET ORAL at 05:18

## 2023-12-13 RX ADMIN — LOSARTAN POTASSIUM 25 MG: 25 TABLET, FILM COATED ORAL at 08:57

## 2023-12-13 RX ADMIN — APIXABAN 5 MG: 5 TABLET, FILM COATED ORAL at 08:58

## 2023-12-13 RX ADMIN — Medication 10 ML: at 20:58

## 2023-12-13 RX ADMIN — ASPIRIN 81 MG: 81 TABLET, CHEWABLE ORAL at 08:58

## 2023-12-13 RX ADMIN — FUROSEMIDE 40 MG: 10 INJECTION, SOLUTION INTRAMUSCULAR; INTRAVENOUS at 15:24

## 2023-12-13 RX ADMIN — APIXABAN 5 MG: 5 TABLET, FILM COATED ORAL at 20:59

## 2023-12-13 RX ADMIN — DOFETILIDE 250 MCG: 0.25 CAPSULE ORAL at 20:59

## 2023-12-13 RX ADMIN — SUCRALFATE 1 G: 1 TABLET ORAL at 14:17

## 2023-12-13 RX ADMIN — LEVOTHYROXINE SODIUM 88 MCG: 88 TABLET ORAL at 08:00

## 2023-12-13 RX ADMIN — SUCRALFATE 1 G: 1 TABLET ORAL at 20:59

## 2023-12-13 RX ADMIN — DOFETILIDE 250 MCG: 0.25 CAPSULE ORAL at 08:59

## 2023-12-13 RX ADMIN — PANTOPRAZOLE SODIUM 40 MG: 40 TABLET, DELAYED RELEASE ORAL at 17:02

## 2023-12-13 RX ADMIN — METOPROLOL SUCCINATE 100 MG: 25 TABLET, EXTENDED RELEASE ORAL at 08:57

## 2023-12-13 RX ADMIN — Medication 10 ML: at 20:59

## 2023-12-13 RX ADMIN — MAGNESIUM SULFATE HEPTAHYDRATE 2000 MG: 40 INJECTION, SOLUTION INTRAVENOUS at 15:26

## 2023-12-13 RX ADMIN — Medication 10 ML: at 08:57

## 2023-12-13 RX ADMIN — COLCHICINE 0.6 MG: 0.6 TABLET ORAL at 08:58

## 2023-12-13 ASSESSMENT — PAIN SCALES - GENERAL
PAINLEVEL_OUTOF10: 0
PAINLEVEL_OUTOF10: 0

## 2023-12-13 NOTE — ACP (ADVANCE CARE PLANNING)
Advance Care Planning     Advance Care Planning Inpatient Note  [facility] Department    Today's Date: 12/13/2023  Unit: [facility] A2 CARD TELEMETRY    Received request from IDT Member. Upon review of chart and communication with care team, patient's decision making abilities are not in question. . Patient and Child/Children was/were present in the room during visit. Goals of ACP Conversation:  Discuss advance care planning documents    Health Care Decision Makers:       Primary Decision Maker: Hodan Richter Child - [phone]  Summary:  Updated Healthcare Decision Maker  Documented Next of Kin, per patient report    Advance Care Planning Documents (Patient Wishes):  Healthcare Power of /Advance Directive Appointment of [address]  Living Will/Advance Directive     Assessment:  Pt received a news she called \"not so good\". Still processing it. Writer offered education for better understanding. Pt wants to think about it and call Spiritual Health when ready. Pt also said, her  recently was diagnosed with dementia. Pt said her daughter Dedrick Gill will be the one to make Healthcare decisions for her if she is not able to make them herself. Writer gave pt a blank copy of Advanced Directives with information on how to call [phone] [address] for assistance when ready.      Interventions:  Provided education on documents for clarity and greater understanding  Discussed and provided education on state decision maker hierarchy  Reviewed but did not complete ACP document    Outcomes/Plan:  ACP Discussion: Postponed  Teach Back Method used to verify the patient's and/or Healthcare Decision Maker's understanding of key information in the advance directive documents    Electronically signed by Chaplain Jose E on 12/13/2023 at 3:59 PM

## 2023-12-13 NOTE — PLAN OF CARE
Problem: Discharge Planning  Goal: Discharge to home or other facility with appropriate resources  Outcome: Progressing     Problem: Safety - Adult  Goal: Free from fall injury  12/12/2023 2322 by Barbra Addison RN  Outcome: Progressing  Note: Pt will remain free from falls throughout hospital stay. Fall precautions in place, bed alarm on, bed in lowest position with wheels locked and side rails 2/4 up. Room door open and hourly rounding completed. Will continue to monitor throughout shift. Problem: ABCDS Injury Assessment  Goal: Absence of physical injury  Outcome: Progressing     Problem: Cardiovascular - Adult  Goal: Maintains optimal cardiac output and hemodynamic stability  Outcome: Progressing     Problem: Cardiovascular - Adult  Goal: Absence of cardiac dysrhythmias or at baseline  Outcome: Progressing     Problem: Pain  Goal: Verbalizes/displays adequate comfort level or baseline comfort level  12/12/2023 2322 by Barbra Addison RN  Outcome: Progressing  Note: Pt will be satisfied with pain control. Pt uses numeric pain rating scale with reassessments after pain med administration. Will continue to monitor progression throughout shift.

## 2023-12-13 NOTE — PLAN OF CARE
Problem: Discharge Planning  Goal: Discharge to home or other facility with appropriate resources  12/13/2023 0917 by Radha Mcallister RN  Outcome: Progressing     Problem: Safety - Adult  Goal: Free from fall injury  12/13/2023 0917 by Radha Mcallister RN  Outcome: Progressing     Problem: ABCDS Injury Assessment  Goal: Absence of physical injury  12/13/2023 0917 by Radha Mcallister RN  Outcome: Progressing     Problem: Cardiovascular - Adult  Goal: Maintains optimal cardiac output and hemodynamic stability  12/13/2023 0917 by Radha Mcallister RN  Outcome: Progressing     Problem: Pain  Goal: Verbalizes/displays adequate comfort level or baseline comfort level  12/13/2023 0917 by Radha Mcallister RN  Outcome: Progressing

## 2023-12-14 VITALS
WEIGHT: 157 LBS | SYSTOLIC BLOOD PRESSURE: 136 MMHG | OXYGEN SATURATION: 98 % | BODY MASS INDEX: 24.64 KG/M2 | HEART RATE: 59 BPM | DIASTOLIC BLOOD PRESSURE: 63 MMHG | RESPIRATION RATE: 17 BRPM | TEMPERATURE: 97.9 F | HEIGHT: 67 IN

## 2023-12-14 DIAGNOSIS — I48.91 RAPID ATRIAL FIBRILLATION (HCC): Primary | ICD-10-CM

## 2023-12-14 LAB
ANION GAP SERPL CALCULATED.3IONS-SCNC: 10 MMOL/L (ref 3–16)
BUN SERPL-MCNC: 17 MG/DL (ref 7–20)
CALCIUM SERPL-MCNC: 8.6 MG/DL (ref 8.3–10.6)
CHLORIDE SERPL-SCNC: 101 MMOL/L (ref 99–110)
CO2 SERPL-SCNC: 23 MMOL/L (ref 21–32)
CREAT SERPL-MCNC: 0.8 MG/DL (ref 0.6–1.2)
EKG ATRIAL RATE: 58 BPM
EKG ATRIAL RATE: 59 BPM
EKG DIAGNOSIS: NORMAL
EKG DIAGNOSIS: NORMAL
EKG P AXIS: 30 DEGREES
EKG P AXIS: 77 DEGREES
EKG P-R INTERVAL: 200 MS
EKG P-R INTERVAL: 232 MS
EKG Q-T INTERVAL: 480 MS
EKG Q-T INTERVAL: 490 MS
EKG QRS DURATION: 88 MS
EKG QRS DURATION: 96 MS
EKG QTC CALCULATION (BAZETT): 471 MS
EKG QTC CALCULATION (BAZETT): 485 MS
EKG R AXIS: 56 DEGREES
EKG R AXIS: 59 DEGREES
EKG T AXIS: 47 DEGREES
EKG T AXIS: 51 DEGREES
EKG VENTRICULAR RATE: 58 BPM
EKG VENTRICULAR RATE: 59 BPM
GFR SERPLBLD CREATININE-BSD FMLA CKD-EPI: >60 ML/MIN/{1.73_M2}
GLUCOSE SERPL-MCNC: 106 MG/DL (ref 70–99)
MAGNESIUM SERPL-MCNC: 2 MG/DL (ref 1.8–2.4)
POTASSIUM SERPL-SCNC: 3.3 MMOL/L (ref 3.5–5.1)
SODIUM SERPL-SCNC: 134 MMOL/L (ref 136–145)

## 2023-12-14 PROCEDURE — 93005 ELECTROCARDIOGRAM TRACING: CPT | Performed by: INTERNAL MEDICINE

## 2023-12-14 PROCEDURE — 2580000003 HC RX 258: Performed by: INTERNAL MEDICINE

## 2023-12-14 PROCEDURE — 93010 ELECTROCARDIOGRAM REPORT: CPT | Performed by: INTERNAL MEDICINE

## 2023-12-14 PROCEDURE — 83735 ASSAY OF MAGNESIUM: CPT

## 2023-12-14 PROCEDURE — 6370000000 HC RX 637 (ALT 250 FOR IP): Performed by: NURSE PRACTITIONER

## 2023-12-14 PROCEDURE — 6370000000 HC RX 637 (ALT 250 FOR IP): Performed by: INTERNAL MEDICINE

## 2023-12-14 PROCEDURE — 80048 BASIC METABOLIC PNL TOTAL CA: CPT

## 2023-12-14 PROCEDURE — 99239 HOSP IP/OBS DSCHRG MGMT >30: CPT | Performed by: NURSE PRACTITIONER

## 2023-12-14 RX ORDER — PANTOPRAZOLE SODIUM 40 MG/1
40 TABLET, DELAYED RELEASE ORAL
Qty: 60 TABLET | Refills: 0 | Status: SHIPPED | OUTPATIENT
Start: 2023-12-14 | End: 2024-01-13

## 2023-12-14 RX ORDER — METOPROLOL SUCCINATE 50 MG/1
50 TABLET, EXTENDED RELEASE ORAL 2 TIMES DAILY
Status: DISCONTINUED | OUTPATIENT
Start: 2023-12-14 | End: 2023-12-14 | Stop reason: HOSPADM

## 2023-12-14 RX ORDER — METOPROLOL SUCCINATE 50 MG/1
50 TABLET, EXTENDED RELEASE ORAL 2 TIMES DAILY
Qty: 60 TABLET | Refills: 3 | Status: SHIPPED | OUTPATIENT
Start: 2023-12-14

## 2023-12-14 RX ORDER — DOFETILIDE 0.12 MG/1
125 CAPSULE ORAL EVERY 12 HOURS SCHEDULED
Qty: 60 CAPSULE | Refills: 3 | Status: SHIPPED | OUTPATIENT
Start: 2023-12-14

## 2023-12-14 RX ORDER — POTASSIUM CHLORIDE 20 MEQ/1
40 TABLET, EXTENDED RELEASE ORAL ONCE
Status: COMPLETED | OUTPATIENT
Start: 2023-12-14 | End: 2023-12-14

## 2023-12-14 RX ORDER — SUCRALFATE 1 G/1
1 TABLET ORAL EVERY 8 HOURS SCHEDULED
Qty: 30 TABLET | Refills: 0 | Status: SHIPPED | OUTPATIENT
Start: 2023-12-14 | End: 2023-12-24

## 2023-12-14 RX ORDER — DOFETILIDE 0.12 MG/1
125 CAPSULE ORAL EVERY 12 HOURS SCHEDULED
Status: DISCONTINUED | OUTPATIENT
Start: 2023-12-14 | End: 2023-12-14 | Stop reason: HOSPADM

## 2023-12-14 RX ADMIN — COLCHICINE 0.6 MG: 0.6 TABLET ORAL at 08:59

## 2023-12-14 RX ADMIN — LOSARTAN POTASSIUM 25 MG: 25 TABLET, FILM COATED ORAL at 09:00

## 2023-12-14 RX ADMIN — SUCRALFATE 1 G: 1 TABLET ORAL at 14:27

## 2023-12-14 RX ADMIN — POTASSIUM CHLORIDE 40 MEQ: 1500 TABLET, EXTENDED RELEASE ORAL at 10:33

## 2023-12-14 RX ADMIN — Medication 10 ML: at 09:00

## 2023-12-14 RX ADMIN — PANTOPRAZOLE SODIUM 40 MG: 40 TABLET, DELAYED RELEASE ORAL at 06:35

## 2023-12-14 RX ADMIN — SUCRALFATE 1 G: 1 TABLET ORAL at 06:35

## 2023-12-14 RX ADMIN — DOFETILIDE 250 MCG: 0.25 CAPSULE ORAL at 08:59

## 2023-12-14 RX ADMIN — APIXABAN 5 MG: 5 TABLET, FILM COATED ORAL at 09:00

## 2023-12-14 RX ADMIN — LEVOTHYROXINE SODIUM 88 MCG: 88 TABLET ORAL at 05:28

## 2023-12-14 RX ADMIN — ASPIRIN 81 MG: 81 TABLET, CHEWABLE ORAL at 09:00

## 2023-12-14 ASSESSMENT — PAIN SCALES - GENERAL: PAINLEVEL_OUTOF10: 0

## 2023-12-14 NOTE — PROGRESS NOTES
Monitor company [employer]  Length of monitor 1 week  Monitor ordered by TABBY SUTTON  Patch ID [id number]  GIVEN TO FLOOR RN TO PLACE/ACTIVATE AT DC.

## 2023-12-14 NOTE — PROGRESS NOTES
Patient refusing Metoprolol dose due to HR in the 50s and would like RN to check with NP. RN call to Cardiology to see if they would still like to administer. Awaiting call back. 4013: Received call back.  Hold medication this morning per NP.

## 2023-12-14 NOTE — CARE COORDINATION
Case Management Assessment  Initial Evaluation    Date/Time of Evaluation: 12/14/2023 10:13 AM  Assessment Completed by: Laith Strauss RN    If patient is discharged prior to next notation, then this note serves as note for discharge by case management. Patient Name: Le Brown                   YOB: 1940  Diagnosis: Unspecified atrial fibrillation [I48.91]  A-fib [facility]) [I48.91]                   Date / Time: 12/11/2023  7:10 AM    Patient Admission Status: Inpatient   Readmission Risk (Low < 19, Mod (19-27), High > 27): Readmission Risk Score: 10.5    Current PCP: Evin Lomeli MD  PCP verified by CM? Yes    Chart Reviewed: Yes      History Provided by: Patient  Patient Orientation: Alert and Oriented, Person, Place, Situation, Self    Patient Cognition: Alert    Hospitalization in the last 30 days (Readmission):  No    If yes, Readmission Assessment in CM Navigator will be completed. Advance Directives:      Code Status: Full Code   Patient's Primary Decision Maker is:      [facility] - [phone]    Discharge Planning:    Patient lives with: Spouse/Significant Other Type of Home: House  Primary Care Giver: Self  Patient Support Systems include: Spouse/Significant Other, Children   Current Financial resources: Medicare  Current community resources: None  Current services prior to admission: None            Current DME:              Type of Home Care services:  None    ADLS  Prior functional level: Independent in ADLs/IADLs  Current functional level: Independent in ADLs/IADLs    PT AM-PAC:   /24  OT AM-PAC:   /24    Family can provide assistance at DC: Yes  Would you like Case Management to discuss the discharge plan with any other family members/significant others, and if so, who?  Yes  Plans to Return to Present Housing: Yes  Potential Assistance needed at discharge: N/A            Potential DME:    Patient expects to discharge to: [address] [place] [address]

## 2023-12-14 NOTE — DISCHARGE INSTR - DIET

## 2023-12-14 NOTE — DISCHARGE INSTRUCTIONS
FOLLOW-UP APPOINTMENTS    [facility]   [address] [place]: [phone]. If you are unable to make this appointment, please call to reschedule [phone]. Please arrive 15 minutes early to complete necessary paperwork. Directions to [employer]  [facility] towards [place]. [address] exit. Right off exit. Cross over [employer]. Right on [address]. Left into hospital. Follow the signs to the emergency room ( turn left toward the Emergency room). Go right at the first stop sign. Just past the Emergency room at the second stop sign turn right and go up the ramp and park on the top level if possible. Go in the glass doors of the [facility] on the top level of the garage [address]. As soon as you get in the door turn left and our office is the one with the glass doors.

## 2023-12-14 NOTE — PLAN OF CARE
Problem: Discharge Planning  Goal: Discharge to home or other facility with appropriate resources  12/14/2023 0912 by Deana Lanza RN  Outcome: Progressing     Problem: Safety - Adult  Goal: Free from fall injury  12/14/2023 0912 by Deana Lanza RN  Outcome: Progressing     Problem: Pain  Goal: Verbalizes/displays adequate comfort level or baseline comfort level  12/14/2023 0912 by Deana Lanza RN  Outcome: Progressing

## 2023-12-14 NOTE — PLAN OF CARE
Problem: Discharge Planning  Goal: Discharge to home or other facility with appropriate resources  12/13/2023 2129 by Quinn Venegas RN  Outcome: Progressing  12/13/2023 0917 by Vincenzo Mccormick RN  Outcome: Progressing     Problem: Safety - Adult  Goal: Free from fall injury  12/13/2023 2129 by Quinn Venegas RN  Outcome: Progressing  Patient ambulatory and steady on her feet. 12/13/2023 0917 by Vincenzo Mccormick RN  Outcome: Progressing     Problem: Cardiovascular - Adult  Goal: Maintains optimal cardiac output and hemodynamic stability  12/13/2023 2129 by Quinn Venegas RN  Outcome: Progressing  12/13/2023 0917 by Vincenzo Mccormick RN  Outcome: Progressing     Problem: Pain  Goal: Verbalizes/displays adequate comfort level or baseline comfort level  12/13/2023 2129 by Quinn Venegas RN  Outcome: Progressing  Patient remains free from pain.    12/13/2023 0917 by Vincenzo Mccormick RN  Outcome: Progressing

## 2023-12-14 NOTE — PROGRESS NOTES
Patient ok for discharge per MD. Matt Colon and IV's removed. [facility] notified. Pt tolerated well. Discharge instructions given and reviewed. Cardiac event monitor placed and use reviewed. All questions and concerns addressed at this time. Patient instructed to stop at outpatient pharmacy on the way out. Patient transported via wheelchair to family car with all belongings.

## 2023-12-15 DIAGNOSIS — I48.19 PERSISTENT ATRIAL FIBRILLATION (HCC): ICD-10-CM

## 2023-12-15 LAB — POC ACT LR: 363 SEC

## 2023-12-15 NOTE — TELEPHONE ENCOUNTER
Pt is requesting refill of Eliquis 5mg for a 3m supply.  Preferred pharmacy is     [address]     Last ov 11/07/2023 agk  Next ov 02/15/2024 k

## 2023-12-22 ENCOUNTER — NURSE ONLY (OUTPATIENT)
Dept: CARDIOLOGY CLINIC | Age: 83
End: 2023-12-22

## 2023-12-22 DIAGNOSIS — T14.8XXA HEMATOMA: Primary | ICD-10-CM

## 2023-12-27 RX ORDER — DOFETILIDE 0.12 MG/1
125 CAPSULE ORAL EVERY 12 HOURS SCHEDULED
Qty: 60 CAPSULE | Refills: 0 | Status: SHIPPED | OUTPATIENT
Start: 2023-12-27

## 2023-12-27 NOTE — TELEPHONE ENCOUNTER
Jasvir Hodges MD   [facility]       12/27/23  9:12 AM  Last potassium in system was low. Needs to be re-checked.  I authorized one fill of this prescription so we get labs checked please

## 2023-12-27 NOTE — TELEPHONE ENCOUNTER
LOV 11/07/2023  Upcoming appt 02/15/2024  BMP 12/14/2023  EKG 12/18/2023    NICKIE AND NATANAEL CLEMENTOT

## 2023-12-29 PROCEDURE — 93228 REMOTE 30 DAY ECG REV/REPORT: CPT | Performed by: INTERNAL MEDICINE

## 2024-01-04 DIAGNOSIS — I42.9 CARDIOMYOPATHY, UNSPECIFIED TYPE (HCC): ICD-10-CM

## 2024-01-04 RX ORDER — LISINOPRIL 5 MG/1
TABLET ORAL
Qty: 180 TABLET | Refills: 3 | OUTPATIENT
Start: 2024-01-04

## 2024-01-04 NOTE — TELEPHONE ENCOUNTER
11/1/2023 [facility]  2/15/2024 AGK upcoming appt  Lisinopril is not on active med list

## 2024-01-11 ENCOUNTER — HOSPITAL ENCOUNTER (OUTPATIENT)
Age: 84
Discharge: HOME | End: 2024-01-11
Payer: MEDICARE

## 2024-01-11 DIAGNOSIS — T14.8XXA HEMATOMA: ICD-10-CM

## 2024-01-11 LAB
ANION GAP SERPL CALCULATED.3IONS-SCNC: 12 MMOL/L (ref 3–16)
BASOPHILS # BLD: 0.1 K/UL (ref 0–0.2)
BASOPHILS NFR BLD: 1.3 %
BUN SERPL-MCNC: 16 MG/DL (ref 7–20)
CALCIUM SERPL-MCNC: 8.9 MG/DL (ref 8.3–10.6)
CHLORIDE SERPL-SCNC: 102 MMOL/L (ref 99–110)
CO2 SERPL-SCNC: 23 MMOL/L (ref 21–32)
CREAT SERPL-MCNC: 0.8 MG/DL (ref 0.6–1.2)
DEPRECATED RDW RBC AUTO: 14.4 % (ref 12.4–15.4)
EOSINOPHIL # BLD: 0.2 K/UL (ref 0–0.6)
EOSINOPHIL NFR BLD: 2.8 %
GFR SERPLBLD CREATININE-BSD FMLA CKD-EPI: >60 ML/MIN/{1.73_M2}
GLUCOSE SERPL-MCNC: 95 MG/DL (ref 70–99)
HCT VFR BLD AUTO: 41.9 % (ref 36–48)
HGB BLD-MCNC: 14 G/DL (ref 12–16)
LYMPHOCYTES # BLD: 2.3 K/UL (ref 1–5.1)
LYMPHOCYTES NFR BLD: 33.6 %
MCH RBC QN AUTO: 30.5 PG (ref 26–34)
MCHC RBC AUTO-ENTMCNC: 33.4 G/DL (ref 31–36)
MCV RBC AUTO: 91.3 FL (ref 80–100)
MONOCYTES # BLD: 0.8 K/UL (ref 0–1.3)
MONOCYTES NFR BLD: 11.7 %
NEUTROPHILS # BLD: 3.4 K/UL (ref 1.7–7.7)
NEUTROPHILS NFR BLD: 50.6 %
PLATELET # BLD AUTO: 271 K/UL (ref 135–450)
PMV BLD AUTO: 7.4 FL (ref 5–10.5)
POTASSIUM SERPL-SCNC: 4 MMOL/L (ref 3.5–5.1)
RBC # BLD AUTO: 4.59 M/UL (ref 4–5.2)
SODIUM SERPL-SCNC: 137 MMOL/L (ref 136–145)
WBC # BLD AUTO: 6.8 K/UL (ref 4–11)

## 2024-01-11 PROCEDURE — 85025 COMPLETE CBC W/AUTO DIFF WBC: CPT

## 2024-01-11 PROCEDURE — 36415 COLL VENOUS BLD VENIPUNCTURE: CPT

## 2024-01-11 PROCEDURE — 80048 BASIC METABOLIC PNL TOTAL CA: CPT

## 2024-01-11 NOTE — RESULT ENCOUNTER NOTE
BMP shows low potassium. Let's have her start taking a potassium supplement. Please find out where she would like that sent.     H/H is much improved.

## 2024-01-12 ENCOUNTER — TELEPHONE (OUTPATIENT)
Dept: CARDIOLOGY CLINIC | Age: 84
End: 2024-01-12

## 2024-01-12 DIAGNOSIS — Z79.899 MEDICATION MANAGEMENT: Primary | ICD-10-CM

## 2024-01-12 DIAGNOSIS — I48.91 RAPID ATRIAL FIBRILLATION (HCC): ICD-10-CM

## 2024-01-12 NOTE — TELEPHONE ENCOUNTER
MONIQUEM for pt to call back. Pt has a BMP order from 1/2/2024. If pt calls back please inform  her of NPAM message. Thanks.

## 2024-01-12 NOTE — TELEPHONE ENCOUNTER
Spoke with pt, pt states she started a potassium supplement 4 days ago. Pt states the name of the medication is called Nature made potassium.     Would you like pt to continue this medication or start the one you offered? Please advise.

## 2024-01-12 NOTE — TELEPHONE ENCOUNTER
----- Message from CECILIA Sadler CNP sent at 1/11/2024  5:17 PM EST -----  BMP shows low potassium. Let's have her start taking a potassium supplement. Please find out where she would like that sent.     H/H is much improved.

## 2024-02-14 NOTE — PROGRESS NOTES
10/30/2021  Impression:  1. Non-obstructive coronary artery disease.  2. Non-ischemic cardiomyopathy.  3. Acute biventricular systolic heart failure with mildly elevated left and right-sided cardiac filling pressures.  4. Mild pulmonary hypertension.  5. Preserved Danish cardiac output and index.  6. Persistent atrial fibrillation.    IMPRESSION:    1. Persistent symptomatic atrial fibrillation ([id number] score 5)  03/01/2022  Patient is a pleasant 81-year-old female with a medical history significant for hypothyroidism and depression who presents from home to [facility].  Patient has had paroxysmal atrial fibrillation and has been symptomatic with palpitations, fatigue, shortness of breath.  She is undergone 2 cardioversions and now presents in atrial fibrillation.  We discussed anticoagulation (NOAC and warfarin), rate control, rhythm control, AVN + pacemaker, and atrial fibrillation ablation.  We reviewed risks and benefits of each approach.  We discussed side effects of class IC, class II, class III, and class IV antiarrhythmics.  All questions were answered.  Patient would like to start with dofetilide and so we will schedule an induction.  All questions concerns addressed.  - Schedule dofetilide admission.  - Continue apixaban 5 mg BID.  - Continue metoprolol 50 mg daily.    4/5/2022  Patient presents from home for follow up having recently been admitted for dofetilide.  She is back in atrial fibrillation and is symptomatic.  We discussed amiodarone vs ablation.  Patient would like to move forward with ablation and so we will schedule.  - Continue dofetilide.  - Continue metoprolol.  - Continue apixaban 5 mg BID.  - Schedule ablation.    1/10/2023  Patient presents for follow up.  She has been doing well from rhythm standpoint.  No known recurrent atrial fibrillation of long standing.  She has not been on on OAC however she is willing to restart given her elevated [id number] score.  - Continue

## 2024-02-15 ENCOUNTER — TELEPHONE (OUTPATIENT)
Dept: CARDIOLOGY CLINIC | Age: 84
End: 2024-02-15

## 2024-02-15 ENCOUNTER — OFFICE VISIT (OUTPATIENT)
Dept: CARDIOLOGY CLINIC | Age: 84
End: 2024-02-15
Payer: COMMERCIAL

## 2024-02-15 VITALS
BODY MASS INDEX: 25.51 KG/M2 | SYSTOLIC BLOOD PRESSURE: 136 MMHG | HEART RATE: 63 BPM | WEIGHT: 162.5 LBS | HEIGHT: 67 IN | DIASTOLIC BLOOD PRESSURE: 82 MMHG | OXYGEN SATURATION: 99 %

## 2024-02-15 DIAGNOSIS — I49.3 PVC (PREMATURE VENTRICULAR CONTRACTION): ICD-10-CM

## 2024-02-15 DIAGNOSIS — I48.19 PERSISTENT ATRIAL FIBRILLATION (HCC): ICD-10-CM

## 2024-02-15 DIAGNOSIS — I48.0 PAROXYSMAL ATRIAL FIBRILLATION (HCC): Primary | ICD-10-CM

## 2024-02-15 DIAGNOSIS — Z09 HOSPITAL DISCHARGE FOLLOW-UP: ICD-10-CM

## 2024-02-15 DIAGNOSIS — I48.91 RAPID ATRIAL FIBRILLATION (HCC): Primary | ICD-10-CM

## 2024-02-15 PROCEDURE — 1111F DSCHRG MED/CURRENT MED MERGE: CPT | Performed by: INTERNAL MEDICINE

## 2024-02-15 PROCEDURE — 1123F ACP DISCUSS/DSCN MKR DOCD: CPT | Performed by: INTERNAL MEDICINE

## 2024-02-15 PROCEDURE — 99214 OFFICE O/P EST MOD 30 MIN: CPT | Performed by: INTERNAL MEDICINE

## 2024-02-15 PROCEDURE — 93000 ELECTROCARDIOGRAM COMPLETE: CPT | Performed by: INTERNAL MEDICINE

## 2024-02-15 RX ORDER — METOPROLOL SUCCINATE 50 MG/1
50 TABLET, EXTENDED RELEASE ORAL DAILY
Qty: 60 TABLET | Refills: 3 | Status: SHIPPED | OUTPATIENT
Start: 2024-02-15 | End: 2024-02-16

## 2024-02-15 RX ORDER — DOFETILIDE 0.12 MG/1
125 CAPSULE ORAL EVERY 12 HOURS SCHEDULED
Qty: 180 CAPSULE | Refills: 3 | Status: SHIPPED | OUTPATIENT
Start: 2024-02-15

## 2024-02-15 NOTE — TELEPHONE ENCOUNTER
Spoke with patient. Per AGK- he wants her to have limited echo. She V/U and will call to schedule.

## 2024-02-15 NOTE — PATIENT INSTRUCTIONS
RECOMMENDATIONS:  Continue Tikosyn and Eliquis.  Decrease metoprolol to 50 mg once daily.   Follow up in 6 months with me.

## 2024-02-16 RX ORDER — METOPROLOL SUCCINATE 50 MG/1
50 TABLET, EXTENDED RELEASE ORAL DAILY
Qty: 90 TABLET | Refills: 3 | Status: SHIPPED | OUTPATIENT
Start: 2024-02-16

## 2024-04-12 ENCOUNTER — HOSPITAL ENCOUNTER (OUTPATIENT)
Dept: CARDIOLOGY | Age: 84
Discharge: HOME | End: 2024-04-12
Attending: INTERNAL MEDICINE
Payer: COMMERCIAL

## 2024-04-12 ENCOUNTER — TELEPHONE (OUTPATIENT)
Dept: CARDIOLOGY CLINIC | Age: 84
End: 2024-04-12

## 2024-04-12 DIAGNOSIS — I48.0 PAROXYSMAL ATRIAL FIBRILLATION (HCC): ICD-10-CM

## 2024-04-12 PROCEDURE — 93308 TTE F-UP OR LMTD: CPT

## 2024-04-12 NOTE — TELEPHONE ENCOUNTER
----- Message from HANS Morales Jr., MD sent at 4/12/2024  3:09 PM EDT -----  Reviewed.  Please let patient know that LVEF has improved and is now normal.

## 2024-05-28 ENCOUNTER — HOSPITAL ENCOUNTER (OUTPATIENT)
Dept: WOMENS IMAGING | Age: 84
Discharge: HOME | End: 2024-05-28
Payer: COMMERCIAL

## 2024-05-28 VITALS — WEIGHT: 150 LBS | HEIGHT: 67 IN | BODY MASS INDEX: 23.54 KG/M2

## 2024-05-28 DIAGNOSIS — Z12.31 ENCOUNTER FOR SCREENING MAMMOGRAM FOR MALIGNANT NEOPLASM OF BREAST: ICD-10-CM

## 2024-05-28 PROCEDURE — 77067 SCR MAMMO BI INCL CAD: CPT

## 2024-06-17 NOTE — PROGRESS NOTES
[facility]   Cardiovascular Evaluation    PATIENT: Yaneli Ellison  DATE: 7/3/2024  MRN: [id number]  [facility]: [id number]  : 1940      Primary Care Doctor: McGilligan, Becky, MD  Reason for evaluation:   Follow-up for atrial fibrillation, non-ischemic cardiomyopathy    Subjective:     Yaneli Ellison is an 83 y.o. female with a history of atrial fibrillation, non-ischemic cardiomyopathy with recovered LVEF, non-obstructive coronary artery disease, essential hypertension, mitral regurgitation, mild pulmonary hypertension, and tobacco use who presents for follow-up.  The patient previously received her cardiology care at [facility] [place].  She was diagnosed with a mildly reduced LVEF of 40-45% on a TTE from [facility] [place] in .  At that time, she underwent invasive coronary angiography which demonstrated no angiographically significant coronary artery disease.  Her LVEF had recovered to 60-65% on a repeat TTE from .    She was initially evaluated by me in clinic on 10/27/21 for further evaluation of newly diagnosed atrial fibrillation.  There were plans to proceed with elective ROBERTO/DCCV once she had an adequate period of anticoagulation with Eliquis.  However, she was then admitted to [facility] from 10/30 - 21 with a UTI and sepsis.  Both urine and blood cultures grew E. Coli and she was treated with an appropriate course of antibiotics.  During her admission, she was in atrial fibrillation with RVR and a TTE obtained on 11/3/21 showed an LVEF of 35-40%, normal RV size with reduced RV systolic function, mild-moderate eccentric and posteriorly-directed mitral regurgitation, mild pulmonic regurgitation, moderate tricuspid regurgitation, and estimated SPAP of 44 mmHg.  She underwent invasive coronary angiography/LHC/RHC on 21 which demonstrated non-obstructive coronary artery disease and the following hemodynamics: RA 8, RV 42/8, PA 40/24 (30), PCWP 17, Danish CO 4.48, and Danish CI 2.43.

## 2024-07-03 ENCOUNTER — OFFICE VISIT (OUTPATIENT)
Dept: CARDIOLOGY CLINIC | Age: 84
End: 2024-07-03
Payer: COMMERCIAL

## 2024-07-03 VITALS
BODY MASS INDEX: 24.64 KG/M2 | WEIGHT: 157 LBS | OXYGEN SATURATION: 97 % | HEART RATE: 82 BPM | SYSTOLIC BLOOD PRESSURE: 132 MMHG | HEIGHT: 67 IN | DIASTOLIC BLOOD PRESSURE: 76 MMHG

## 2024-07-03 DIAGNOSIS — I27.20 PULMONARY HYPERTENSION (HCC): ICD-10-CM

## 2024-07-03 DIAGNOSIS — I47.29 NSVT (NONSUSTAINED VENTRICULAR TACHYCARDIA) (HCC): ICD-10-CM

## 2024-07-03 DIAGNOSIS — Z87.891 FORMER TOBACCO USE: ICD-10-CM

## 2024-07-03 DIAGNOSIS — I42.8 NON-ISCHEMIC CARDIOMYOPATHY (HCC): Primary | ICD-10-CM

## 2024-07-03 DIAGNOSIS — R60.9 SWELLING: ICD-10-CM

## 2024-07-03 DIAGNOSIS — I10 ESSENTIAL HYPERTENSION: ICD-10-CM

## 2024-07-03 DIAGNOSIS — I48.0 PAROXYSMAL ATRIAL FIBRILLATION (HCC): ICD-10-CM

## 2024-07-03 PROCEDURE — 3075F SYST BP GE 130 - 139MM HG: CPT | Performed by: INTERNAL MEDICINE

## 2024-07-03 PROCEDURE — 99214 OFFICE O/P EST MOD 30 MIN: CPT | Performed by: INTERNAL MEDICINE

## 2024-07-03 PROCEDURE — 1123F ACP DISCUSS/DSCN MKR DOCD: CPT | Performed by: INTERNAL MEDICINE

## 2024-07-03 PROCEDURE — 3078F DIAST BP <80 MM HG: CPT | Performed by: INTERNAL MEDICINE

## 2024-07-03 RX ORDER — FUROSEMIDE 20 MG/1
20 TABLET ORAL PRN
Qty: 30 TABLET | Refills: 1 | Status: SHIPPED | OUTPATIENT
Start: 2024-07-03

## 2024-07-03 NOTE — PATIENT INSTRUCTIONS
1. Continue aspirin 81 mg daily, Eliquis 5 mg BID, metoprolol succinate 50 mg BID, losartan 25 mg daily.  2. Continue to encourage heart healthy, low sodium diet.  3.  lasix 20 mg as needed for weight gain of 3 lbs in one day or 5 lbs over 3 days.   4. Follow-up with me in 1 year.

## 2024-08-19 ENCOUNTER — TELEPHONE (OUTPATIENT)
Dept: CARDIOLOGY CLINIC | Age: 84
End: 2024-08-19

## 2024-08-19 NOTE — TELEPHONE ENCOUNTER
Pt stated she is having her knee replaced 10/2/24 with [facility] and she was informed that she will need cardiac clx and an EKG within 30 days of the surgery. Does pt need to be seen again with [facility] or [facility] for this clx or can the pt come in sometime after 9/2/24 to have EKG?  Pt had to cancel 8/20/24 agk appt bc if she needs to be seen with EP it would have to be after 9/2/24 and before 9/13/24 as she is going out of town for an urgent need to help a dying friend. The pt has waited a year to have this procedure done as she has been helping her friend out in another state. Sending to [facility] first as pt just saw him on 7/3/24, pt last saw [facility] 2/15/24. Pt can be reached at [phone].

## 2024-08-20 NOTE — TELEPHONE ENCOUNTER
[phone] (Fax).    Front will try to reach pt again to arrange EKG for after 9/2/24 and before 10/2. Add to appt notes: \"EKG for cardiac clrx [facility] pt, fax EKG to [facility]\"

## 2024-08-20 NOTE — TELEPHONE ENCOUNTER
The patient's cardiac condition is not prohibitory to undergo surgery. The patient's cardiac risk is moderate based upon my evaluation and testing. Stable to proceed.       Would need to hold eliquis for 48 hours.

## 2024-08-20 NOTE — TELEPHONE ENCOUNTER
Front- interventional cardiology cleared pt, can you assist in scheduling a EKG after 9/2 prior to 10/2. Please note \"EKG for cardiac clrx [facility] pt, fax EKG to [facility]\"     Attempted to reach pt, left vm to call office back.    Will call [facility] when they open for fax number and send letter.

## 2024-08-20 NOTE — TELEPHONE ENCOUNTER
Fax number did not work, called [facility], obtained [phone]. Fax and letter scanned into media

## 2024-09-05 ENCOUNTER — LAB (OUTPATIENT)
Dept: CARDIOLOGY CLINIC | Age: 84
End: 2024-09-05
Payer: COMMERCIAL

## 2024-09-05 DIAGNOSIS — I49.3 PVC (PREMATURE VENTRICULAR CONTRACTION): Primary | ICD-10-CM

## 2024-09-05 DIAGNOSIS — I48.0 PAROXYSMAL ATRIAL FIBRILLATION (HCC): ICD-10-CM

## 2024-09-05 DIAGNOSIS — R00.2 PALPITATIONS: ICD-10-CM

## 2024-09-05 DIAGNOSIS — I48.19 PERSISTENT ATRIAL FIBRILLATION (HCC): ICD-10-CM

## 2024-09-05 PROCEDURE — 93000 ELECTROCARDIOGRAM COMPLETE: CPT | Performed by: INTERNAL MEDICINE

## 2024-09-13 LAB
BASOPHILS ABSOLUTE: 0.1 /ΜL
BASOPHILS RELATIVE PERCENT: 1 %
BUN BLDV-MCNC: 13 MG/DL
CALCIUM SERPL-MCNC: 9.5 MG/DL
CHLORIDE BLD-SCNC: 95 MMOL/L
CO2: 21 MMOL/L
CREAT SERPL-MCNC: 0.8 MG/DL
EGFR: 74
EOSINOPHILS ABSOLUTE: 0.1 /ΜL
EOSINOPHILS RELATIVE PERCENT: 1 %
ESTIMATED AVERAGE GLUCOSE: NORMAL
GLUCOSE BLD-MCNC: 100 MG/DL
HBA1C MFR BLD: 6.1 %
HCT VFR BLD CALC: 43 % (ref 36–46)
HEMOGLOBIN: 14.1 G/DL (ref 12–16)
LYMPHOCYTES ABSOLUTE: 1.9 /ΜL
LYMPHOCYTES RELATIVE PERCENT: 18 %
MCH RBC QN AUTO: 30.1 PG
MCHC RBC AUTO-ENTMCNC: 32.8 G/DL
MCV RBC AUTO: 92 FL
MONOCYTES ABSOLUTE: 1.1 /ΜL
MONOCYTES RELATIVE PERCENT: 11 %
NEUTROPHILS ABSOLUTE: 7.3 /ΜL
NEUTROPHILS RELATIVE PERCENT: 68 %
PLATELET # BLD: 319 K/ΜL
PMV BLD AUTO: NORMAL FL
POTASSIUM SERPL-SCNC: 4.1 MMOL/L
RBC # BLD: 4.68 10^6/ΜL
SODIUM BLD-SCNC: 131 MMOL/L
WBC # BLD: 10.6 10^3/ML

## 2024-12-22 ENCOUNTER — OFFICE VISIT (OUTPATIENT)
Age: 84
End: 2024-12-22

## 2024-12-22 VITALS
SYSTOLIC BLOOD PRESSURE: 126 MMHG | HEART RATE: 56 BPM | BODY MASS INDEX: 24.64 KG/M2 | DIASTOLIC BLOOD PRESSURE: 74 MMHG | HEIGHT: 67 IN | TEMPERATURE: 97 F | OXYGEN SATURATION: 99 % | WEIGHT: 157 LBS

## 2024-12-22 DIAGNOSIS — R53.81 MALAISE AND FATIGUE: Primary | ICD-10-CM

## 2024-12-22 DIAGNOSIS — Z87.440 HISTORY OF UTI: ICD-10-CM

## 2024-12-22 DIAGNOSIS — R53.83 MALAISE AND FATIGUE: Primary | ICD-10-CM

## 2024-12-22 LAB
BILIRUBIN, POC: NEGATIVE
BLOOD URINE, POC: ABNORMAL
CLARITY, POC: CLEAR
COLOR, POC: YELLOW
GLUCOSE URINE, POC: NEGATIVE MG/DL
KETONES, POC: NEGATIVE MG/DL
LEUKOCYTE EST, POC: NEGATIVE
NITRITE, POC: NEGATIVE
PH, POC: 6
PROTEIN, POC: NEGATIVE MG/DL
SPECIFIC GRAVITY, POC: 1.01
UROBILINOGEN, POC: 0.2 MG/DL

## 2024-12-22 RX ORDER — CEPHALEXIN 500 MG/1
500 CAPSULE ORAL 2 TIMES DAILY
COMMUNITY
Start: 2024-10-30

## 2024-12-22 RX ORDER — DOXYCYCLINE HYCLATE 100 MG
TABLET ORAL
COMMUNITY
Start: 2024-12-20

## 2024-12-24 LAB — BACTERIA UR CULT: NORMAL

## 2024-12-26 ENCOUNTER — TELEPHONE (OUTPATIENT)
Dept: CARDIOLOGY CLINIC | Age: 84
End: 2024-12-26

## 2024-12-26 DIAGNOSIS — I48.19 PERSISTENT ATRIAL FIBRILLATION (HCC): ICD-10-CM

## 2024-12-26 RX ORDER — METOPROLOL SUCCINATE 50 MG/1
50 TABLET, EXTENDED RELEASE ORAL DAILY
Qty: 90 TABLET | Refills: 1 | Status: SHIPPED | OUTPATIENT
Start: 2024-12-26

## 2024-12-26 RX ORDER — METOPROLOL SUCCINATE 50 MG/1
50 TABLET, EXTENDED RELEASE ORAL DAILY
Qty: 90 TABLET | Refills: 1 | Status: SHIPPED | OUTPATIENT
Start: 2024-12-26 | End: 2024-12-26 | Stop reason: SDUPTHER

## 2024-12-26 NOTE — TELEPHONE ENCOUNTER
Pt called and stated that she had to cancel 6mos fu with [facility] due to having knee surgery.  Pt stated that she had to cancel knee surgery due to many family issues coming up.  Pt stated that she was getting ready to go out of town due to death in the family.  Pt needs refill for Metoprolol Succinate 50mg sent to   [id number] - [place], [place] - [address] - P [phone] - F [phone] [address] [facility] [address]  Phone: [phone]  Fax: [phone]     Pt scheduled for 6mos ov fu with [facility] on 4/8/25.  Please send medication refill to get pt to appt

## 2025-01-21 DIAGNOSIS — I48.19 PERSISTENT ATRIAL FIBRILLATION (HCC): ICD-10-CM

## 2025-01-21 NOTE — TELEPHONE ENCOUNTER
Per last OV note  RECOMMENDATIONS:  Continue Tikosyn and Eliquis.  Decrease metoprolol to 50 mg once daily.   Follow up in 6 months with me.       Last OV 02/15/2024  Upcoming OV 04/08/2025  BMP 01/2024   EKG 09/2024  Will call pt at appropriate time for updated labs.

## 2025-01-21 NOTE — TELEPHONE ENCOUNTER
Pt stated that she is out of the Metoprolol Succinate 50mg. She said that she was taking it 2x a day previously but thinks agk changed it to 1x a day. She did  prescription from 12/26 but they only gave her 30 day supply. Preferred pharmacy is     [id number] [address] - [place] [phone]     Next ov 04/08/2025 saad

## 2025-01-22 NOTE — TELEPHONE ENCOUNTER
Pt has not CMP done since 12/2023. Will contact pt regarding needing labs done.     Labs placed into epic.

## 2025-01-31 RX ORDER — METOPROLOL SUCCINATE 50 MG/1
50 TABLET, EXTENDED RELEASE ORAL DAILY
Qty: 90 TABLET | Refills: 0 | Status: SHIPPED | OUTPATIENT
Start: 2025-01-31

## 2025-01-31 NOTE — TELEPHONE ENCOUNTER
Called pt and she stated she had labs completed by PCP. I called PCP office to have labs faxed over.

## 2025-03-09 DIAGNOSIS — I48.19 PERSISTENT ATRIAL FIBRILLATION (HCC): ICD-10-CM

## 2025-03-10 RX ORDER — DOFETILIDE 0.12 MG/1
125 CAPSULE ORAL EVERY 12 HOURS SCHEDULED
Qty: 180 CAPSULE | Refills: 0 | Status: SHIPPED | OUTPATIENT
Start: 2025-03-10

## 2025-03-10 RX ORDER — APIXABAN 5 MG/1
5 TABLET, FILM COATED ORAL 2 TIMES DAILY
Qty: 180 TABLET | Refills: 0 | Status: SHIPPED | OUTPATIENT
Start: 2025-03-10

## 2025-05-15 ENCOUNTER — TELEPHONE (OUTPATIENT)
Dept: CARDIOLOGY CLINIC | Age: 85
End: 2025-05-15

## 2025-05-15 NOTE — TELEPHONE ENCOUNTER
Pt called office and stated that she was working in the yard and when she got done, she was so bruised that her arms and hand appeared almost black with bruising. Pt stated she is in her daughters wedding on May 24th. Pt stated she takes Eliquis two times daily and wondered if it was possible for her to either hold the eliquis for a few days before the wedding or lower the dosage. She said she always bruises super easy and would like to try to help resolve bruising before wedding if possible. Please advise to pt if possible, thank you!  Best call back: [phone]

## 2025-05-15 NOTE — TELEPHONE ENCOUNTER
Spoke with pt informed pt that not taking eliquis put her at a higher risk for stroke. Pt v/u. Pt would like to know if her dose can be lowered but also would like to know if it would help the bruising.

## 2025-05-16 NOTE — TELEPHONE ENCOUNTER
I called patient and left VM.  She may call back with times to chat.  I can chat with her this afternoon or next week.  Thanks.

## 2025-05-16 NOTE — TELEPHONE ENCOUNTER
PT returned call if possible to call pt back today the pt is available until 5 pm.  If not today next week 05/19 pt will be available all day.

## 2025-05-19 ENCOUNTER — TELEPHONE (OUTPATIENT)
Dept: CARDIOLOGY CLINIC | Age: 85
End: 2025-05-19

## 2025-05-19 DIAGNOSIS — I48.19 PERSISTENT ATRIAL FIBRILLATION (HCC): ICD-10-CM

## 2025-05-19 RX ORDER — METOPROLOL SUCCINATE 50 MG/1
50 TABLET, EXTENDED RELEASE ORAL DAILY
Qty: 90 TABLET | Refills: 3 | Status: SHIPPED | OUTPATIENT
Start: 2025-05-19

## 2025-05-19 NOTE — TELEPHONE ENCOUNTER
Pharmacy sts they did not get updated order for change in medication back in January. Pharmacy requesting order be faxed to [phone] for metoprolol succinate (TOPROL XL) 50 MG extended release tablet : Take 1 tablet by mouth daily **Patient requests 90 days supply**

## 2025-05-19 NOTE — TELEPHONE ENCOUNTER
Our records show that this medication was updated but was sent to [facility]. RX signed for [facility].     VAHID 9/2024.   LOV NPAM 7/2024.

## 2025-05-19 NOTE — TELEPHONE ENCOUNTER
I spoke with patient.  She will continue on apixaban.  We will discuss watchman again at next visit.  Thanks.

## 2025-06-07 DIAGNOSIS — I48.19 PERSISTENT ATRIAL FIBRILLATION (HCC): ICD-10-CM

## 2025-06-09 NOTE — TELEPHONE ENCOUNTER
Last Office Visit: 7/3/2024 Provider: TORRI  **Is provider OOT? No    Next Office Visit: 7/24/2025 Provider: TORRI    **Has patient already had 30 day supply? No    Lab orders needed? no   Encounter provider correct? Yes If not, change provider  Script changes since last refill? no    LAST LABS:   BMP:  Lab Results   Component Value Date/Time     09/12/2024 12:00 AM    K 4.1 09/12/2024 12:00 AM    K 3.7 11/04/2021 05:26 AM    CL 95 09/12/2024 12:00 AM    CO2 21 09/12/2024 12:00 AM    BUN 13 09/12/2024 12:00 AM    CREATININE 0.80 09/12/2024 12:00 AM    GLUCOSE 100 09/12/2024 12:00 AM    CALCIUM 9.5 09/12/2024 12:00 AM    LABGLOM 74 09/12/2024 12:00 AM      CBC:  Lab Results   Component Value Date    WBC 10.6 09/12/2024    HGB 14.1 09/12/2024    HCT 43.0 09/12/2024    MCV 92 09/12/2024     09/12/2024    LYMPHOPCT 18 09/12/2024    RBC 4.68 09/12/2024    MCH 30.1 09/12/2024    MCHC 32.8 09/12/2024    RDW 14.4 01/11/2024       **Care Everywhere? no

## 2025-06-10 RX ORDER — APIXABAN 5 MG/1
5 TABLET, FILM COATED ORAL 2 TIMES DAILY
Qty: 180 TABLET | Refills: 0 | Status: SHIPPED | OUTPATIENT
Start: 2025-06-10

## 2025-06-16 DIAGNOSIS — I48.19 PERSISTENT ATRIAL FIBRILLATION (HCC): ICD-10-CM

## 2025-06-16 RX ORDER — DOFETILIDE 0.12 MG/1
125 CAPSULE ORAL EVERY 12 HOURS SCHEDULED
Qty: 180 CAPSULE | Refills: 0 | Status: SHIPPED | OUTPATIENT
Start: 2025-06-16 | End: 2025-06-18

## 2025-06-16 NOTE — TELEPHONE ENCOUNTER
Pt called requesting a refill for   dofetilide (TIKOSYN) 125 MCG capsule   Please send to   [facility] - [address] - P [phone] - F [phone] [[id number]]

## 2025-06-16 NOTE — TELEPHONE ENCOUNTER
Last Office Visit: 02/15/2024 Provider: TORRI  **Is provider OOT? No    Next Office Visit: 2025 Provider: TORRI    **Has patient already had 30 day supply? No    Lab orders needed? no   Encounter provider correct? Yes If not, change provider  Script changes since last refill? no    LAST LABS:   [id number]    **Care Everywhere? no

## 2025-06-18 ENCOUNTER — TELEPHONE (OUTPATIENT)
Dept: CARDIOLOGY CLINIC | Age: 85
End: 2025-06-18

## 2025-06-18 DIAGNOSIS — I48.19 PERSISTENT ATRIAL FIBRILLATION (HCC): ICD-10-CM

## 2025-06-18 RX ORDER — DOFETILIDE 0.12 MG/1
125 CAPSULE ORAL EVERY 12 HOURS SCHEDULED
Qty: 180 CAPSULE | Refills: 0 | Status: SHIPPED | OUTPATIENT
Start: 2025-06-18 | End: 2025-06-18 | Stop reason: SDUPTHER

## 2025-06-18 RX ORDER — DOFETILIDE 0.12 MG/1
125 CAPSULE ORAL EVERY 12 HOURS SCHEDULED
Qty: 180 CAPSULE | Refills: 0 | Status: SHIPPED | OUTPATIENT
Start: 2025-06-18

## 2025-06-18 RX ORDER — DOFETILIDE 0.12 MG/1
125 CAPSULE ORAL EVERY 12 HOURS SCHEDULED
Qty: 180 CAPSULE | Refills: 0 | Status: SHIPPED | OUTPATIENT
Start: 2025-06-18 | End: 2025-06-18

## 2025-06-18 NOTE — TELEPHONE ENCOUNTER
RX was verified and refilled by TORRI ob 06/16/2025. However, this script was sent to the incorrect pharmacy. Sent to correct pharmacy for refill.

## 2025-06-18 NOTE — TELEPHONE ENCOUNTER
Received call from [facility] outpt pharmacy. Pt is there to  Rx for dofetilide. We had previously received a call asking that we send that rx to [facility]. That was done. Pt went to pick it up, its not there, pt was upset, called the Rx into the outpt pharmacy.     Attempted to call pt, does she need us to call the [facility] pharmacy to cancel the rx there?

## 2025-06-18 NOTE — TELEPHONE ENCOUNTER
The patient called stating she was currently at the outpatient pharmacy for her prescription and that she did not want it to go to [facility]. RX sent back to the outpatient pharmacy. Patient notified.

## 2025-06-18 NOTE — TELEPHONE ENCOUNTER
Radha from [facility] called regarding prescrition Dofetilide for pt.  This pharmacy will be the pt new pharmacy and a new prescription needs to be sent to them to fill the medications.  The new pharmacy information is:    [facility] - [place], [place] - [address] - P [phone] - F [phone] [[id number]]     If there are any questions please call [phone]

## 2025-06-23 NOTE — TELEPHONE ENCOUNTER
Kit Levine, does she have a Minekey mobile? If not that may be a good way to monitor. Also we could send a cardiac monitor for a couple of weeks to see whats going on. Please let me know how she'd like to proceed.
Patient requested 90 day supply, I will send in. Thanks.
Pt will be in at 4 pm for a monitor. She does not have Kardia monitor device.
Spoke with pt, made sure pt was aware that [address] sent in 90 day supply. Pt aware. Pt stated that she feels like that her heart is beating hard and a little SOB yesterday and she just wanted to let [address] know. I asked pt if she has anything at home to measure heart rate and pt stated that EKG graph and it looks good. Pt stated that she will call later today if it comes back.
23

## 2025-07-08 ENCOUNTER — HOSPITAL ENCOUNTER (OUTPATIENT)
Dept: WOMENS IMAGING | Age: 85
Discharge: HOME | End: 2025-07-08
Payer: COMMERCIAL

## 2025-07-08 VITALS — BODY MASS INDEX: 27.6 KG/M2 | WEIGHT: 150 LBS | HEIGHT: 62 IN

## 2025-07-08 DIAGNOSIS — Z12.31 SCREENING MAMMOGRAM FOR BREAST CANCER: ICD-10-CM

## 2025-07-08 PROCEDURE — 77067 SCR MAMMO BI INCL CAD: CPT

## 2025-07-24 ENCOUNTER — OFFICE VISIT (OUTPATIENT)
Dept: CARDIOLOGY CLINIC | Age: 85
End: 2025-07-24
Payer: COMMERCIAL

## 2025-07-24 VITALS
DIASTOLIC BLOOD PRESSURE: 74 MMHG | OXYGEN SATURATION: 100 % | HEART RATE: 68 BPM | HEIGHT: 62 IN | WEIGHT: 154.5 LBS | BODY MASS INDEX: 28.43 KG/M2 | SYSTOLIC BLOOD PRESSURE: 126 MMHG

## 2025-07-24 DIAGNOSIS — I48.19 PERSISTENT ATRIAL FIBRILLATION (HCC): ICD-10-CM

## 2025-07-24 DIAGNOSIS — I49.3 PVC (PREMATURE VENTRICULAR CONTRACTION): Primary | ICD-10-CM

## 2025-07-24 PROCEDURE — 93000 ELECTROCARDIOGRAM COMPLETE: CPT | Performed by: INTERNAL MEDICINE

## 2025-07-24 PROCEDURE — 3074F SYST BP LT 130 MM HG: CPT | Performed by: INTERNAL MEDICINE

## 2025-07-24 PROCEDURE — 99214 OFFICE O/P EST MOD 30 MIN: CPT | Performed by: INTERNAL MEDICINE

## 2025-07-24 PROCEDURE — G2211 COMPLEX E/M VISIT ADD ON: HCPCS | Performed by: INTERNAL MEDICINE

## 2025-07-24 PROCEDURE — 3078F DIAST BP <80 MM HG: CPT | Performed by: INTERNAL MEDICINE

## 2025-07-24 NOTE — PATIENT INSTRUCTIONS
RECOMMENDATIONS:  Continue cardiac medications as prescribed.    -Consider coming off of metoprolol and switching to atenolol if fatigue does not improve.   2. Discussed risks and benefits of Watchman due to polypharmacy and increased bruising.    -Patient would like to think about her options at this time.    -Education provided.   3. Follow up with me in 3 months

## 2025-07-24 NOTE — PROGRESS NOTES
[facility]   Electrophysiology Note            Date: 7/24/25  Patient Name: Yaneli Ellison  YOB: 1940    Primary Care Physician: McGilligan, Becky, MD    CHIEF COMPLAINT:   Chief Complaint   Patient presents with    1 Year Follow Up    Atrial Fibrillation    Other     PVC      HISTORY OF PRESENT ILLNESS: Yaneli Ellison is a 84 y.o. female with a PMH significant for AF, CHF, ICM, pulmonary HTN. Patient had echo in 2005 that demonstrated an EF of 40-45%. She underwent a cardiac cath at that time that demonstrated significant coronary disease. Her EF recovered in 2014 to 60-65%. Patient was diagnosed with AF in 10/2021. She was admitted in 11/2021 with UTI and sepsis and was found to be in AF RVR at this time. Her echo on 11/3/2021 demonstrated 35-40%. Patient underwent DCCV on 1/17/2022. Patient was recently in [place] and went in to AF and required a CV there.    On 3/1/2022, ECG demonstrates AF (86). She states that she is doing well. She is fatigued when she is in AF. She feels like her energy levels are lower than they used to be. Patient was admitted for Tikosyn loading on 3/14/2022. Patient underwent DCCV on 3/14/2022.     On 4/5/2022, ECG demonstrates AF (100). She states that she feels ok. She felt better and had a lot more energy when she was in normal rhythm.    An RFCA of AF was discussed and planned, but was cancelled due to patient preference. She also stopped taking her Eliquis due to her preference.    On 1/10/2023, ECG demonstrates SR 68 BPM. She reports that she is doing well. She has not had any breakthrough AF that she is aware of. She is not taking her blood thinner.   At cardiology office visit 11/1/2023 Toprol was increased to 100mg BID for attempted rate control with AF.    Patient underwent RFCA of AF and typical AFL on 12/11/2023. Tikosyn was resumed post procedure. Patient wore a cardiac event monitor from 12/14/2023 to 12/21/2023 which demonstrated predominately

## 2025-08-22 ENCOUNTER — HOSPITAL ENCOUNTER (OUTPATIENT)
Dept: LAB | Age: 85
Discharge: HOME | End: 2025-08-22
Payer: COMMERCIAL

## 2025-08-22 LAB
25(OH)D3 SERPL-MCNC: 33.6 NG/ML
ALBUMIN SERPL-MCNC: 4.2 G/DL (ref 3.4–5)
ALBUMIN/GLOB SERPL: 1.5 {RATIO} (ref 1.1–2.2)
ALP SERPL-CCNC: 68 U/L (ref 40–129)
ALT SERPL-CCNC: 13 U/L (ref 10–40)
ANION GAP SERPL CALCULATED.3IONS-SCNC: 14 MMOL/L (ref 3–16)
AST SERPL-CCNC: 22 U/L (ref 15–37)
BASOPHILS # BLD: 0.1 K/UL (ref 0–0.2)
BASOPHILS NFR BLD: 1.4 %
BILIRUB SERPL-MCNC: 0.7 MG/DL (ref 0–1)
BILIRUB UR QL STRIP.AUTO: NEGATIVE
BUN SERPL-MCNC: 11 MG/DL (ref 7–20)
CALCIUM SERPL-MCNC: 9.4 MG/DL (ref 8.3–10.6)
CHLORIDE SERPL-SCNC: 101 MMOL/L (ref 99–110)
CHOLEST SERPL-MCNC: 194 MG/DL (ref 0–199)
CLARITY UR: CLEAR
CO2 SERPL-SCNC: 22 MMOL/L (ref 21–32)
COLOR UR: YELLOW
CREAT SERPL-MCNC: 0.8 MG/DL (ref 0.6–1.2)
DEPRECATED RDW RBC AUTO: 14.3 % (ref 12.4–15.4)
EOSINOPHIL # BLD: 0.1 K/UL (ref 0–0.6)
EOSINOPHIL NFR BLD: 1.7 %
EPI CELLS #/AREA URNS HPF: ABNORMAL /HPF (ref 0–5)
GFR SERPLBLD CREATININE-BSD FMLA CKD-EPI: 73 ML/MIN/{1.73_M2}
GLUCOSE SERPL-MCNC: 107 MG/DL (ref 70–99)
GLUCOSE UR STRIP.AUTO-MCNC: NEGATIVE MG/DL
HCT VFR BLD AUTO: 41.8 % (ref 36–48)
HDLC SERPL-MCNC: 80 MG/DL (ref 40–60)
HGB BLD-MCNC: 14.5 G/DL (ref 12–16)
HGB UR QL STRIP.AUTO: NEGATIVE
KETONES UR STRIP.AUTO-MCNC: NEGATIVE MG/DL
LDLC SERPL CALC-MCNC: 99 MG/DL
LEUKOCYTE ESTERASE UR QL STRIP.AUTO: ABNORMAL
LYMPHOCYTES # BLD: 1.6 K/UL (ref 1–5.1)
LYMPHOCYTES NFR BLD: 25.9 %
MCH RBC QN AUTO: 31.1 PG (ref 26–34)
MCHC RBC AUTO-ENTMCNC: 34.7 G/DL (ref 31–36)
MCV RBC AUTO: 89.6 FL (ref 80–100)
MONOCYTES # BLD: 0.7 K/UL (ref 0–1.3)
MONOCYTES NFR BLD: 11.2 %
NEUTROPHILS # BLD: 3.6 K/UL (ref 1.7–7.7)
NEUTROPHILS NFR BLD: 59.8 %
NITRITE UR QL STRIP.AUTO: NEGATIVE
PH UR STRIP.AUTO: 5.5 [PH] (ref 5–8)
PLATELET # BLD AUTO: 291 K/UL (ref 135–450)
PMV BLD AUTO: 7.1 FL (ref 5–10.5)
POTASSIUM SERPL-SCNC: 4.4 MMOL/L (ref 3.5–5.1)
PROT SERPL-MCNC: 7 G/DL (ref 6.4–8.2)
PROT UR STRIP.AUTO-MCNC: NEGATIVE MG/DL
RBC # BLD AUTO: 4.67 M/UL (ref 4–5.2)
RBC #/AREA URNS HPF: ABNORMAL /HPF (ref 0–4)
SODIUM SERPL-SCNC: 137 MMOL/L (ref 136–145)
SP GR UR STRIP.AUTO: 1.01 (ref 1–1.03)
T4 SERPL-MCNC: 9.6 UG/DL (ref 4.5–10.9)
TRIGL SERPL-MCNC: 73 MG/DL (ref 0–150)
TSH SERPL DL<=0.005 MIU/L-ACNC: 1.55 UIU/ML (ref 0.27–4.2)
UA COMPLETE W REFLEX CULTURE PNL UR: ABNORMAL
UA DIPSTICK W REFLEX MICRO PNL UR: YES
URN SPEC COLLECT METH UR: ABNORMAL
UROBILINOGEN UR STRIP-ACNC: 0.2 E.U./DL
VIT B12 SERPL-MCNC: 424 PG/ML (ref 211–911)
VLDLC SERPL CALC-MCNC: 15 MG/DL
WBC # BLD AUTO: 6 K/UL (ref 4–11)
WBC #/AREA URNS HPF: ABNORMAL /HPF (ref 0–5)

## 2025-08-22 PROCEDURE — 80061 LIPID PANEL: CPT

## 2025-08-22 PROCEDURE — 36415 COLL VENOUS BLD VENIPUNCTURE: CPT

## 2025-08-22 PROCEDURE — 80053 COMPREHEN METABOLIC PANEL: CPT

## 2025-08-22 PROCEDURE — 84436 ASSAY OF TOTAL THYROXINE: CPT

## 2025-08-22 PROCEDURE — 84443 ASSAY THYROID STIM HORMONE: CPT

## 2025-08-22 PROCEDURE — 85025 COMPLETE CBC W/AUTO DIFF WBC: CPT

## 2025-08-22 PROCEDURE — 82607 VITAMIN B-12: CPT

## 2025-08-22 PROCEDURE — 81001 URINALYSIS AUTO W/SCOPE: CPT

## 2025-08-22 PROCEDURE — 82306 VITAMIN D 25 HYDROXY: CPT
